# Patient Record
Sex: MALE | Race: WHITE | NOT HISPANIC OR LATINO | Employment: OTHER | URBAN - METROPOLITAN AREA
[De-identification: names, ages, dates, MRNs, and addresses within clinical notes are randomized per-mention and may not be internally consistent; named-entity substitution may affect disease eponyms.]

---

## 2018-09-17 ENCOUNTER — TELEPHONE (OUTPATIENT)
Dept: GASTROENTEROLOGY | Facility: CLINIC | Age: 73
End: 2018-09-17

## 2018-09-17 NOTE — TELEPHONE ENCOUNTER
----- Message from Alyson Saldivar sent at 9/17/2018 12:15 PM CDT -----  Contact: pt  States he's calling regarding the mass on his liver. States he has a CT scan at the [facility] (Dr Marrufo his lung doctor). Please call pt at [phone]. Thank you

## 2018-09-17 NOTE — TELEPHONE ENCOUNTER
Spoke with patient and scheduled appointment and he verbalized understanding that we will have to have hos signature to get records from [facility].

## 2018-09-18 ENCOUNTER — TELEPHONE (OUTPATIENT)
Dept: GASTROENTEROLOGY | Facility: CLINIC | Age: 73
End: 2018-09-18

## 2018-09-18 NOTE — TELEPHONE ENCOUNTER
Spoke with patient and he will goa and  report and labs.  And if he cannot get them on Wednesday he will reschedule.

## 2018-09-18 NOTE — TELEPHONE ENCOUNTER
----- Message from Kwadwo Castellano PA-C sent at 9/18/2018  3:01 PM CDT -----  He is on my schedule for thurs for liver mass. Please check and see if he has any records or do we need to request them.   Thanks.

## 2018-09-20 ENCOUNTER — OFFICE VISIT (OUTPATIENT)
Dept: GASTROENTEROLOGY | Facility: CLINIC | Age: 73
End: 2018-09-20
Payer: MEDICARE

## 2018-09-20 ENCOUNTER — LAB VISIT (OUTPATIENT)
Dept: LAB | Facility: HOSPITAL | Age: 73
End: 2018-09-20
Attending: PHYSICIAN ASSISTANT
Payer: MEDICARE

## 2018-09-20 VITALS
HEIGHT: 71 IN | HEART RATE: 79 BPM | SYSTOLIC BLOOD PRESSURE: 122 MMHG | BODY MASS INDEX: 23.46 KG/M2 | WEIGHT: 167.56 LBS | DIASTOLIC BLOOD PRESSURE: 68 MMHG

## 2018-09-20 DIAGNOSIS — K76.9 LIVER LESION: ICD-10-CM

## 2018-09-20 LAB
AFP SERPL-MCNC: 26 NG/ML
ALBUMIN SERPL BCP-MCNC: 3.8 G/DL
ALP SERPL-CCNC: 72 U/L
ALT SERPL W/O P-5'-P-CCNC: 13 U/L
ANION GAP SERPL CALC-SCNC: 6 MMOL/L
AST SERPL-CCNC: 22 U/L
BILIRUB SERPL-MCNC: 0.8 MG/DL
BUN SERPL-MCNC: 15 MG/DL
CALCIUM SERPL-MCNC: 9.6 MG/DL
CHLORIDE SERPL-SCNC: 107 MMOL/L
CO2 SERPL-SCNC: 28 MMOL/L
CREAT SERPL-MCNC: 1.6 MG/DL
EST. GFR  (AFRICAN AMERICAN): 49 ML/MIN/1.73 M^2
EST. GFR  (NON AFRICAN AMERICAN): 42.4 ML/MIN/1.73 M^2
GLUCOSE SERPL-MCNC: 98 MG/DL
INR PPP: 1
POTASSIUM SERPL-SCNC: 4.1 MMOL/L
PROT SERPL-MCNC: 7.9 G/DL
PROTHROMBIN TIME: 10.3 SEC
SODIUM SERPL-SCNC: 141 MMOL/L

## 2018-09-20 PROCEDURE — 82105 ALPHA-FETOPROTEIN SERUM: CPT

## 2018-09-20 PROCEDURE — 36415 COLL VENOUS BLD VENIPUNCTURE: CPT

## 2018-09-20 PROCEDURE — 99214 OFFICE O/P EST MOD 30 MIN: CPT | Mod: S$PBB,,, | Performed by: PHYSICIAN ASSISTANT

## 2018-09-20 PROCEDURE — 85610 PROTHROMBIN TIME: CPT

## 2018-09-20 PROCEDURE — 99213 OFFICE O/P EST LOW 20 MIN: CPT | Mod: PBBFAC | Performed by: PHYSICIAN ASSISTANT

## 2018-09-20 PROCEDURE — 80053 COMPREHEN METABOLIC PANEL: CPT

## 2018-09-20 PROCEDURE — 99999 PR PBB SHADOW E&M-EST. PATIENT-LVL III: CPT | Mod: PBBFAC,,, | Performed by: PHYSICIAN ASSISTANT

## 2018-09-20 NOTE — PROGRESS NOTES
Subjective:      Patient ID: Shahab Pompa is a 73 y.o. male.    Chief Complaint: Other (liver mas)    HPI  The patient has a history of HCV which was treated with Harvoni in 2016. Patient achieved SVR. At the time, the patient had no evidence of advanced liver disease. He has not followed up since. Today he reports having a liver mass on recent chest imaging. A report has been obtained of a CT chest w/o contrast which was done 09/10/18. Findings included an indeterminate 3.4 cm right hepatic lobe mass and an indeterminate 1.8 cm left upper pole renal mass. He was also noted to have pulmonary fibrosis and cholelithiasis. An MRI was recommended to assess the liver lesion.     He has a personal h/o cancer:  -Prostate cancer treated in 2003 with prostatectomy. His PSA was normal the entire time. Last PSA I see was 0.14 in 2017. His brother and father had PCA.   -Bladder cancer - localized and removed through cystoscopic resection. He thinks his last scope was about six months ago. It was clear. He had one BCG treatment years ago.   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 This was over 5 years ago.   He doesn't have an Oncologist at this time.   He had a normal colonoscopy in 2004. It was normal.     OUTSIDE RECORDS:  (08/23/18)  WBC 7.4  HGB 13.6  HCT 40.3    BUN 16  CREATININE 1.29  GFR 66  T. BILI 0.5  AST 22  ALT 14  ALP 68  ALBUMIN 4.1    Review of Systems  As per HPI.     Objective:     Physical Exam   Constitutional: He is oriented to person, place, and time. He appears well-developed and well-nourished. No distress.   HENT:   Head: Normocephalic and atraumatic.   Eyes: EOM are normal.   Cardiovascular: Normal rate and regular rhythm.   Pulmonary/Chest: Effort normal and breath sounds normal. No respiratory distress. He has no wheezes.   Abdominal: Soft. Bowel sounds are normal. He exhibits no distension. There is no tenderness.   Neurological: He is alert and oriented to person, place, and time. No cranial nerve  deficit.   Skin: He is not diaphoretic.   Psychiatric: His behavior is normal.       Assessment:     1. Liver lesion        Plan:     Discussed findings and appropriate work up including MRI. Further recommendations after results available.     Orders Placed This Encounter   Procedures    MRI Abdomen W WO Contrast    AFP tumor marker    Protime-INR    Comprehensive metabolic panel          Follow-up if symptoms worsen or fail to improve.

## 2018-10-01 ENCOUNTER — HOSPITAL ENCOUNTER (OUTPATIENT)
Dept: RADIOLOGY | Facility: HOSPITAL | Age: 73
Discharge: HOME | End: 2018-10-01
Attending: PHYSICIAN ASSISTANT
Payer: MEDICARE

## 2018-10-01 DIAGNOSIS — K76.9 LIVER LESION: ICD-10-CM

## 2018-10-02 ENCOUNTER — TELEPHONE (OUTPATIENT)
Dept: GASTROENTEROLOGY | Facility: HOSPITAL | Age: 73
End: 2018-10-02

## 2018-10-02 DIAGNOSIS — F41.9 ANXIETY: Primary | ICD-10-CM

## 2018-10-02 RX ORDER — LORAZEPAM 2 MG/1
2 TABLET ORAL EVERY 6 HOURS PRN
Qty: 8 TABLET | Refills: 0 | Status: SHIPPED | OUTPATIENT
Start: 2018-10-02 | End: 2018-12-19 | Stop reason: ALTCHOICE

## 2018-10-02 NOTE — TELEPHONE ENCOUNTER
----- Message from Kwadwo Castellano PA-C sent at 10/1/2018  3:43 PM CDT -----  Contact: pt   Do you mind prescribing him something for his MRI. He tried to have it last week and had a panic attack. He said he would have a .   Kwadwo Rios     ----- Message -----  From: Tyesha Warren MA  Sent: 10/1/2018   2:21 PM  To: Kwadwo Castellano PA-C    Spoke with patient and he will have a  to and from the MRI  ----- Message -----  From: Kwadwo Castellano PA-C  Sent: 10/1/2018   1:03 PM  To: Tyesha Warren MA    Would he be willing to take something before to calm him down? If so, he would need a ride two and from the MRI.   Kwadwo    ----- Message -----  From: Tyesha Warren MA  Sent: 10/1/2018  12:42 PM  To: Kwadwo Castellano PA-C    Patient was unable to do MRI today, he  Had a panic attack.  Please advise  ----- Message -----  From: Sandee Vazquez  Sent: 10/1/2018  11:12 AM  To: Gray Burkett S Staff    States he's calling to have discuss being prescribed something for his MRI. Came in today and had a panic attack and was told to discuss with Dr to have him take something and can be reached at [phone]//nina/edwige orlando to speak with Ms. Arambula - wants to resched MRI    [facility] - [place], [place] - [address]  [address]  [address]  [facility] [address]  Phone: [phone] Fax: [phone]

## 2018-10-02 NOTE — TELEPHONE ENCOUNTER
Patient is aware of medication sent to pharmacy and he will reschedule MRI, and he verbalized understanding.

## 2018-10-02 NOTE — TELEPHONE ENCOUNTER
Anxiety  -     LORazepam (ATIVAN) 2 MG Tab; Take 1 tablet (2 mg total) by mouth every 6 (six) hours as needed.  Dispense: 8 tablet; Refill: 0      Augustine Fenton

## 2018-10-26 ENCOUNTER — TELEPHONE (OUTPATIENT)
Dept: GASTROENTEROLOGY | Facility: CLINIC | Age: 73
End: 2018-10-26

## 2018-10-26 NOTE — TELEPHONE ENCOUNTER
----- Message from Samantha Oden sent at 10/26/2018  2:47 PM CDT -----  Contact: PT  Please call pt @ [phone] regarding open MRI, pt states he need to speak with Tyesha.

## 2018-11-02 ENCOUNTER — TELEPHONE (OUTPATIENT)
Dept: GASTROENTEROLOGY | Facility: CLINIC | Age: 73
End: 2018-11-02

## 2018-11-02 DIAGNOSIS — Z86.19 HX OF HEPATITIS C: ICD-10-CM

## 2018-11-02 DIAGNOSIS — K76.9 LIVER LESION: Primary | ICD-10-CM

## 2018-11-02 NOTE — TELEPHONE ENCOUNTER
----- Message from Alyson Saldivar sent at 11/2/2018 11:33 AM CDT -----  Contact: pt  States he's calling regarding his MRI results of his liver. Please call pt at [phone]. Thank you

## 2018-11-02 NOTE — TELEPHONE ENCOUNTER
Reviewed MRI dated 10/29/18 done at [facility]. MRI was scanned in to media.   3.7 x 3.4 x 4.4 cm solid mass in the posterior segment of the right hepatic dome that is highly suspicious for malignancy. No additional lesions identified  - spoke to patient about results  - plan for CT guided biopsy of liver lesion. Will also ask that random biopsy be done for liver staging as there is no clear evidence of liver cirrhosis.    - needs STAT labs prior.   - He did have a non-contrast Ct at an outside facility. Recommended contrasted study for staging but patient very hesitant due to limited financial funds. Will hold for now.

## 2018-11-05 ENCOUNTER — DOCUMENTATION ONLY (OUTPATIENT)
Dept: GASTROENTEROLOGY | Facility: HOSPITAL | Age: 73
End: 2018-11-05

## 2018-11-05 ENCOUNTER — LAB VISIT (OUTPATIENT)
Dept: LAB | Facility: HOSPITAL | Age: 73
End: 2018-11-05
Attending: NURSE PRACTITIONER
Payer: MEDICARE

## 2018-11-05 DIAGNOSIS — Z86.19 HX OF HEPATITIS C: ICD-10-CM

## 2018-11-05 DIAGNOSIS — K76.9 LIVER LESION: ICD-10-CM

## 2018-11-05 LAB
APTT BLDCRRT: 30.3 SEC
BASOPHILS # BLD AUTO: 0.05 K/UL
BASOPHILS NFR BLD: 0.7 %
DIFFERENTIAL METHOD: ABNORMAL
EOSINOPHIL # BLD AUTO: 0.7 K/UL
EOSINOPHIL NFR BLD: 10 %
ERYTHROCYTE [DISTWIDTH] IN BLOOD BY AUTOMATED COUNT: 13.7 %
HCT VFR BLD AUTO: 44.7 %
HGB BLD-MCNC: 14.4 G/DL
INR PPP: 1
LYMPHOCYTES # BLD AUTO: 2.1 K/UL
LYMPHOCYTES NFR BLD: 28.6 %
MCH RBC QN AUTO: 29.7 PG
MCHC RBC AUTO-ENTMCNC: 32.2 G/DL
MCV RBC AUTO: 92 FL
MONOCYTES # BLD AUTO: 0.9 K/UL
MONOCYTES NFR BLD: 12 %
NEUTROPHILS # BLD AUTO: 3.6 K/UL
NEUTROPHILS NFR BLD: 48.7 %
PLATELET # BLD AUTO: 201 K/UL
PMV BLD AUTO: 9.3 FL
PROTHROMBIN TIME: 10.1 SEC
RBC # BLD AUTO: 4.85 M/UL
WBC # BLD AUTO: 7.32 K/UL

## 2018-11-05 PROCEDURE — 36415 COLL VENOUS BLD VENIPUNCTURE: CPT

## 2018-11-05 PROCEDURE — 85025 COMPLETE CBC W/AUTO DIFF WBC: CPT

## 2018-11-05 PROCEDURE — 85730 THROMBOPLASTIN TIME PARTIAL: CPT

## 2018-11-05 PROCEDURE — 85610 PROTHROMBIN TIME: CPT

## 2018-11-09 ENCOUNTER — TELEPHONE (OUTPATIENT)
Dept: RADIOLOGY | Facility: HOSPITAL | Age: 73
End: 2018-11-09

## 2018-11-12 ENCOUNTER — HOSPITAL ENCOUNTER (OUTPATIENT)
Dept: RADIOLOGY | Facility: HOSPITAL | Age: 73
Discharge: HOME | End: 2018-11-12
Attending: NURSE PRACTITIONER | Admitting: INTERNAL MEDICINE
Payer: MEDICARE

## 2018-11-12 ENCOUNTER — HOSPITAL ENCOUNTER (OUTPATIENT)
Facility: HOSPITAL | Age: 73
Discharge: HOME | End: 2018-11-12
Attending: INTERNAL MEDICINE | Admitting: INTERNAL MEDICINE
Payer: MEDICARE

## 2018-11-12 VITALS
WEIGHT: 170 LBS | HEIGHT: 71 IN | BODY MASS INDEX: 23.8 KG/M2 | RESPIRATION RATE: 16 BRPM | SYSTOLIC BLOOD PRESSURE: 132 MMHG | HEART RATE: 57 BPM | TEMPERATURE: 98 F | OXYGEN SATURATION: 99 % | DIASTOLIC BLOOD PRESSURE: 77 MMHG

## 2018-11-12 DIAGNOSIS — Z86.19 HX OF HEPATITIS C: ICD-10-CM

## 2018-11-12 DIAGNOSIS — K76.9 LIVER LESION: ICD-10-CM

## 2018-11-12 PROCEDURE — 88313 SPECIAL STAINS GROUP 2: CPT | Mod: 26,,, | Performed by: PATHOLOGY

## 2018-11-12 PROCEDURE — 27200940 CT BIOPSY LIVER (XPD)

## 2018-11-12 PROCEDURE — 88307 TISSUE EXAM BY PATHOLOGIST: CPT | Mod: 26,,, | Performed by: PATHOLOGY

## 2018-11-12 PROCEDURE — 63600175 PHARM REV CODE 636 W HCPCS: Performed by: RADIOLOGY

## 2018-11-12 PROCEDURE — 88307 TISSUE EXAM BY PATHOLOGIST: CPT | Mod: 59 | Performed by: PATHOLOGY

## 2018-11-12 PROCEDURE — 88313 SPECIAL STAINS GROUP 2: CPT | Mod: 59 | Performed by: PATHOLOGY

## 2018-11-12 RX ORDER — FENTANYL CITRATE 50 UG/ML
INJECTION, SOLUTION INTRAMUSCULAR; INTRAVENOUS CODE/TRAUMA/SEDATION MEDICATION
Status: COMPLETED | OUTPATIENT
Start: 2018-11-12 | End: 2018-11-12

## 2018-11-12 RX ORDER — MIDAZOLAM HYDROCHLORIDE 1 MG/ML
INJECTION INTRAMUSCULAR; INTRAVENOUS CODE/TRAUMA/SEDATION MEDICATION
Status: COMPLETED | OUTPATIENT
Start: 2018-11-12 | End: 2018-11-12

## 2018-11-12 RX ADMIN — FENTANYL CITRATE 50 MCG: 50 INJECTION, SOLUTION INTRAMUSCULAR; INTRAVENOUS at 09:11

## 2018-11-12 RX ADMIN — MIDAZOLAM HYDROCHLORIDE 1 MG: 1 INJECTION, SOLUTION INTRAMUSCULAR; INTRAVENOUS at 09:11

## 2018-11-12 NOTE — DISCHARGE INSTRUCTIONS
Recovery After Procedural Sedation (Adult)  You have been given medicine by vein to make you sleep during your surgery. This may have included both a pain medicine and sleeping medicine. Most of the effects have worn off. But you may still have some drowsiness for the next 6 to 8 hours.  Home care  Follow these guidelines when you get home:  · For the next 8 hours, you should be watched by a responsible adult. This person should make sure your condition is not getting worse.  · Don't drink any alcohol for the next 24 hours.  · Don't drive, operate dangerous machinery, or make important business or personal decisions during the next 24 hours.  Note: Your healthcare provider may tell you not to take any medicine by mouth for pain or sleep in the next 4 hours. These medicines may react with the medicines you were given in the hospital. This could cause a much stronger response than usual.  Follow-up care  Follow up with your healthcare provider if you are not alert and back to your usual level of activity within 12 hours.  When to seek medical advice  Call your healthcare provider right away if any of these occur:  · Drowsiness gets worse  · Weakness or dizziness gets worse  · Repeated vomiting  · You can't be awakened   Date Last Reviewed: 10/18/2016  © 5441-9842 [employer]. [address] [place] [address], [place] [address]. All rights reserved. This information is not intended as a substitute for professional medical care. Always follow your healthcare professional's instructions.        Discharge Instructions for Liver Biopsy  You had a procedure called liver biopsy. A healthcare provider used a special needle to remove a small piece of tissue from your liver. Then it was examined for signs of damage or disease. A liver biopsy is ordered after other tests have shown that your liver is not working properly. You may also have a liver biopsy when liver disease is suspected, to determine whether there is too  much iron in the liver, or to rule out cancer.  Home care  Recommendations include the following:   · Because you had anesthesia, you should not drive until the day after your biopsy.   · Remove the bandage covering the biopsy site 48 hours after the procedure.  · Rest for 6 hours and take it easy when you arrive home.  · Dont shower for 24 hours after the biopsy. If you wish, you may wash yourself with a sponge or washcloth. When you are able to shower, dont scrub the site. Gently wash the area and pat it dry.  · Dont lift anything heavier than 10 pounds for up to 1 week after the procedure, or as advised by your healthcare provider.  · Don't do strenuous activities or exercises for up to 1 week after the procedure.  · Ask your healthcare provider when you can return to work.  · Do not start taking blood thinners without clear instructions from your healthcare provider.  Follow-up care  Make a follow-up appointment as directed by our staff.     When to call your healthcare provider  Call your healthcare provider immediately if you have any of the following:  · Bleeding from the biopsy site  · Dizziness or lightheadedness  · Sudden or increased shortness of breath  · Sudden chest pain  · Fever of 100.4°F (38.0°C) or higher, or as directed by your healthcare provider  · Shaking chills  · Yellow eyes or skin  · Increasing redness, tenderness, or swelling at the biopsy site  · Drainage from the biopsy site  · Opening of the biopsy site  · Vomiting blood  · Rectal bleeding or bloody stools  · Increasing pain, with or without activity, in the liver or belly area, or pain shooting to the right shoulder   Date Last Reviewed: 7/1/2016 © 2000-2017 [employer]. [address] [place] [id number]. All rights reserved. This information is not intended as a substitute for professional medical care. Always follow your healthcare professional's instructions.

## 2018-11-12 NOTE — SEDATION DOCUMENTATION
Procedure complete, pt tolerated well.  Vss.  Band aid placed to R side puncture site, site WDL.  Pt awake and alert, able to follow commands. Pt denied pain or discomfort at this time.

## 2018-11-12 NOTE — SEDATION DOCUMENTATION
Pt in ct on table with bilateral arms above head. Pt verbalized understanding of procedure.  Cm in place, vss.

## 2018-11-12 NOTE — DISCHARGE SUMMARY
Sterile technique was performed in the RUQ, lidocaine was used as a local anesthetic.  Multiple samples taken from liver core and liver mass.  Pt tolerated the procedure well without immediate complications.  Please see radiologist report for details. F/u with PCP and/or ordering physician.

## 2018-11-12 NOTE — H&P
[facility] -   History & Physical    Subjective:      Chief Complaint/Reason for Admission: HCV and liver mass    Shahab Pompa is a 73 y.o. male.    Past Medical History:   Diagnosis Date    Cancer     prostate, bladder, and ear    Fibrosis lung     GERD (gastroesophageal reflux disease)     Hepatitis C     tx'd in 2016 w/ Eliasoni - achieved SVR    Neuromuscular disorder      Past Surgical History:   Procedure Laterality Date    BLADDER SURGERY      cancer removal    CAROTID ENDARTERECTOMY Right     INGUINAL HERNIA REPAIR Bilateral     KNEE SURGERY      PROSTATE SURGERY      cancer removal     Family History   Problem Relation Age of Onset    Cancer Mother     Cancer Father         bladder and prostate    Colon cancer Neg Hx     Stomach cancer Neg Hx      Social History     Tobacco Use    Smoking status: Current Every Day Smoker     Packs/day: 0.50     Years: 30.00     Pack years: 15.00    Smokeless tobacco: Never Used   Substance Use Topics    Alcohol use: No    Drug use: No         (Not in a hospital admission)  Review of patient's allergies indicates:  No Known Allergies     Review of Systems   Constitutional: Negative.    Respiratory: Negative.    Cardiovascular: Negative.    Gastrointestinal: Negative.    Skin: Negative.        Objective:      Vital Signs (Most Recent)  Temp: 98.1 °F (36.7 °C) (11/12/18 0834)  Pulse: 62 (11/12/18 0943)  Resp: 12 (11/12/18 0943)  BP: 127/76 (11/12/18 0943)  SpO2: 100 % (11/12/18 0943)    Vital Signs Range (Last 24H):  Temp:  [98.1 °F (36.7 °C)]   Pulse:  [62-69]   Resp:  [12-21]   BP: (117-139)/(74-76)   SpO2:  [99 %-100 %]     Physical Exam   Constitutional: He appears well-developed and well-nourished.   Eyes: Pupils are equal, round, and reactive to light.   Neck: Normal range of motion.   Cardiovascular: Normal rate.   Pulmonary/Chest: Effort normal.   Abdominal: Soft.       Data Review:    CBC:   Lab Results   Component Value Date    WBC  7.32 11/05/2018    RBC 4.85 11/05/2018    HGB 14.4 11/05/2018    HCT 44.7 11/05/2018     11/05/2018      ECG: no prior ECG.     Assessment:      There are no hospital problems to display for this patient.      Plan:    CT guided liver mass and general liver tissue

## 2018-11-19 ENCOUNTER — TELEPHONE (OUTPATIENT)
Dept: GASTROENTEROLOGY | Facility: CLINIC | Age: 73
End: 2018-11-19

## 2018-11-19 NOTE — TELEPHONE ENCOUNTER
----- Message from Dana Voss sent at 11/19/2018 12:17 PM CST -----  Patient needs call back to get test results..[phone] (home)

## 2018-11-19 NOTE — TELEPHONE ENCOUNTER
Returned call to pt who has questions about liver bx and would like a call from Provider. Pt can be reached at [phone]

## 2018-11-23 ENCOUNTER — TELEPHONE (OUTPATIENT)
Dept: GASTROENTEROLOGY | Facility: CLINIC | Age: 73
End: 2018-11-23

## 2018-11-23 NOTE — TELEPHONE ENCOUNTER
I spoke to the patient about his biopsy results. Dr. Greene recommends getting the films and presenting him to Radiology conference. He reported understanding and will contact us in two weeks.

## 2018-11-26 ENCOUNTER — TELEPHONE (OUTPATIENT)
Dept: GASTROENTEROLOGY | Facility: CLINIC | Age: 73
End: 2018-11-26

## 2018-11-26 NOTE — TELEPHONE ENCOUNTER
Patient: Shahab Pompa       MRN: [id number]      : 1945     Age: 73 y.o.  [address]  [address]  [place] [id number]    Provider: Hepatologist - Víctor Castellano    Urgency of review: urgent    Patient Transplant Status: Other Recent abnormal MRI showing liver lesion    Reason for presentation: Indeterminate lesion    Clinical Summary: The patient has a history of HCV which was treated with Harvoni in 2016. Patient achieved SVR. At the time, the patient had no evidence of advanced liver disease. He reported to GI clinic two months ago with liver mass seen on chest imaging 09/10/18. The CT chest w/o contrast showed an indeterminate 3.4 cm right hepatic lobe mass and an indeterminate 1.8 cm left upper pole renal mass. He was also noted to have pulmonary fibrosis and cholelithiasis. An MRI was recommended which was done 10/29/18 and showed a solid, enhancing right hepatic mass highly suspicious for malignancy. This was followed by liver biopsy showing no cancer. Variable fibrosis, portal expansion and thickened septa dividing the cores, consistent with macronodules, stage 4 out of 4AFP 26.       Imaging to be reviewed: Outside MRI    HCC Treatment History: None    ABO: N/A    Platelets:   Lab Results   Component Value Date/Time     2018 12:28 PM     Creatinine:   Lab Results   Component Value Date/Time    CREATININE 1.6 (H) 2018 08:52 AM     Bilirubin:   Lab Results   Component Value Date/Time    BILITOT 0.8 2018 08:52 AM     AFP Last 3 each if available:   Lab Results   Component Value Date/Time    AFP 26 (H) 2018 08:52 AM    AFP 5.6 2016 09:07 AM       MELD: MELD-Na score: 11 at 2018  8:52 AM  MELD score: 11 at 2018  8:52 AM  Calculated from:  Serum Creatinine: 1.6 mg/dL at 2018  8:52 AM  Serum Sodium: 141 mmol/L (Rounded to 137 mmol/L) at 2018  8:52 AM  Total Bilirubin: 0.8 mg/dL (Rounded to 1 mg/dL) at 2018  8:52 AM  INR(ratio): 1 at  9/20/2018  8:52 AM  Age: 73 years    Plan:     Follow-up Provider:

## 2018-11-26 NOTE — TELEPHONE ENCOUNTER
----- Message from Kwadwo Castellano PA-C sent at 11/23/2018  3:02 PM CST -----  We need to get a copy of the MRI on disc so he can be presented in to Radiology conference.   Kwadwo Castellano    ----- Message -----  From: Shannon Greene MD  Sent: 11/16/2018   5:11 PM  To: Geovanni Calero MD, Kwadwo Castellano PA-C, #    Ok, I see the MRI was indeterminante and looks like he is not a cirrhotic patient?    ----- Message -----  From: Geovanni Claero MD  Sent: 11/16/2018   3:49 PM  To: Kwadwo Castellano PA-C, Shannon Greene MD, #    Not that I am aware of.  Was thinking of just seeing if we can see it by ultrasound for bx.    ----- Message -----  From: Shannon Greene MD  Sent: 11/16/2018   3:27 PM  To: Geovanni Calero MD, Kwadwo Castellano PA-C, #    Is there a reason he can't get a MRI? Metal in his body?    ----- Message -----  From: Geovanni Calero MD  Sent: 11/16/2018   2:58 PM  To: Shannon Greene MD, Tami Thomas NP    Thought I was in it but bx showed no tumor.  Get an ultrasound to see if we can see and maybe try bx with ultrasound.

## 2018-11-27 ENCOUNTER — CONFERENCE (OUTPATIENT)
Dept: TRANSPLANT | Facility: CLINIC | Age: 73
End: 2018-11-27

## 2018-11-27 ENCOUNTER — TELEPHONE (OUTPATIENT)
Dept: GASTROENTEROLOGY | Facility: CLINIC | Age: 73
End: 2018-11-27

## 2018-11-27 NOTE — TELEPHONE ENCOUNTER
The patient's case was presented this morning. The recommendation was for him to see Dr. Fulton in [place]. I contacted the patient and let him know that the committee was concerned for cancer and recommended a surgical consultation. His questions were answered and he reported understanding. He is agreeable to consultation in [place].

## 2018-11-27 NOTE — TELEPHONE ENCOUNTER
Referral received from Dr Greene  Initial  referral from   Patient with Histroy of HCV with  HCC  MELD  11  Referred for liver transplant for CONSULT    Referral completed and forwarded to [facility].          Insurance: EPIC  PRIMARY:   ID:  Contact #     SECONDARY:   ID:  Contact #

## 2018-11-27 NOTE — TELEPHONE ENCOUNTER
Patient: Shahab Pompa       MRN: [id number]      : 1945     Age: 73 y.o.  [address]  [address]  [place] [address]     Provider: Hepatologist - Víctor Castellano     Urgency of review: urgent     Patient Transplant Status: Other Recent abnormal MRI showing liver lesion     Reason for presentation: Indeterminate lesion     Clinical Summary: The patient has a history of HCV which was treated with Harvoni in 2016. Patient achieved SVR. At the time, the patient had no evidence of advanced liver disease. He reported to GI clinic two months ago with liver mass seen on chest imaging 09/10/18. The CT chest w/o contrast showed an indeterminate 3.4 cm right hepatic lobe mass and an indeterminate 1.8 cm left upper pole renal mass. He was also noted to have pulmonary fibrosis and cholelithiasis. An MRI was recommended which was done 10/29/18 and showed a solid, enhancing right hepatic mass highly suspicious for malignancy. This was followed by liver biopsy showing no cancer. Variable fibrosis, portal expansion and thickened septa dividing the cores, consistent with macronodules, stage 4 out of 4AFP 26.         Imaging to be reviewed: Outside MRI     HCC Treatment History: None     ABO: N/A     Platelets:         Lab Results   Component Value Date/Time      2018 12:28 PM      Creatinine:         Lab Results   Component Value Date/Time     CREATININE 1.6 (H) 2018 08:52 AM      Bilirubin:         Lab Results   Component Value Date/Time     BILITOT 0.8 2018 08:52 AM      AFP Last 3 each if available:         Lab Results   Component Value Date/Time     AFP 26 (H) 2018 08:52 AM     AFP 5.6 2016 09:07 AM         MELD: MELD-Na score: 11 at 2018  8:52 AM  MELD score: 11 at 2018  8:52 AM  Calculated from:  Serum Creatinine: 1.6 mg/dL at 2018  8:52 AM  Serum Sodium: 141 mmol/L (Rounded to 137 mmol/L) at 2018  8:52 AM  Total Bilirubin: 0.8 mg/dL (Rounded to 1  mg/dL) at 9/20/2018  8:52 AM  INR(ratio): 1 at 9/20/2018  8:52 AM  Age: 73 years     Plan: MRI shows a lobulated enhancing mass (4.0 cm) which washes out. There is associated pseudocapsule. This lesion is concerning for malignancy.       Concerning for HCC enhancement pseudo capsule and washout.  Right posterior lobe resection.  Surgery Consult with Dr Fulton       Patient notified of IR review and recommendations by JACOB ROGERS  Understanding and agreement expressed.        Follow-up Provider:

## 2018-12-03 ENCOUNTER — INITIAL CONSULT (OUTPATIENT)
Dept: TRANSPLANT | Facility: CLINIC | Age: 73
End: 2018-12-03
Payer: MEDICARE

## 2018-12-03 VITALS
DIASTOLIC BLOOD PRESSURE: 75 MMHG | HEIGHT: 71 IN | SYSTOLIC BLOOD PRESSURE: 130 MMHG | TEMPERATURE: 99 F | OXYGEN SATURATION: 98 % | WEIGHT: 168.88 LBS | RESPIRATION RATE: 17 BRPM | HEART RATE: 103 BPM | BODY MASS INDEX: 23.64 KG/M2

## 2018-12-03 DIAGNOSIS — C22.0 HCC (HEPATOCELLULAR CARCINOMA): ICD-10-CM

## 2018-12-03 DIAGNOSIS — Z01.818 PRE-OP TESTING: Primary | ICD-10-CM

## 2018-12-03 PROCEDURE — 1101F PT FALLS ASSESS-DOCD LE1/YR: CPT | Mod: S$GLB,,, | Performed by: TRANSPLANT SURGERY

## 2018-12-03 PROCEDURE — 99999 PR PBB SHADOW E&M-EST. PATIENT-LVL III: CPT | Mod: PBBFAC,TXP,,

## 2018-12-03 PROCEDURE — 99204 OFFICE O/P NEW MOD 45 MIN: CPT | Mod: S$GLB,,, | Performed by: TRANSPLANT SURGERY

## 2018-12-04 DIAGNOSIS — C22.0 HCC (HEPATOCELLULAR CARCINOMA): Primary | ICD-10-CM

## 2018-12-04 NOTE — PROGRESS NOTES
Subjective:       Patient ID: Shahab Pompa is a 73 y.o. male.    Chief Complaint: Hepatic Cancer (HCC)    73 M referred for management of hepatic mass. Mr Pompa has has a history of hepatitis C (treated, SVR), prostate cancer and IPF. A liver mass was noted on surveillance imaging. CT guided biopsy was not diagnostic for HCC, but subsequent imaging with MRI was diagnostic for HCC with enhancement and washout with pseudocapsule. There is no evidence of extrahepatic disease and his liver function and platelets are normal. He otherwise is feeling well with no complaints.      Review of Systems   Constitutional: Negative for activity change and appetite change.   HENT: Negative for congestion and tinnitus.    Eyes: Negative for redness and visual disturbance.   Respiratory: Negative for cough and shortness of breath.    Cardiovascular: Negative for chest pain and palpitations.   Gastrointestinal: Negative for abdominal distention and abdominal pain.   Endocrine: Negative for polydipsia and polyuria.   Genitourinary: Negative for decreased urine volume and dysuria.   Musculoskeletal: Negative for arthralgias and back pain.   Skin: Negative for pallor and rash.   Allergic/Immunologic: Negative for environmental allergies and immunocompromised state.   Neurological: Negative for tremors and headaches.   Hematological: Negative for adenopathy. Does not bruise/bleed easily.   Psychiatric/Behavioral: Negative for behavioral problems and confusion.       Objective:      Physical Exam   Constitutional: He is oriented to person, place, and time. He appears well-developed and well-nourished. No distress.   HENT:   Head: Normocephalic.   Eyes: No scleral icterus.   Neck: Normal range of motion. Neck supple. No JVD present.   Cardiovascular: Normal rate, regular rhythm and intact distal pulses.   Pulmonary/Chest: Effort normal. No respiratory distress.   Abdominal: Soft. He exhibits no mass. There is no rebound and no guarding.        Musculoskeletal: Normal range of motion.   Neurological: He is alert and oriented to person, place, and time.   Skin: Skin is warm and dry. He is not diaphoretic.   Psychiatric: He has a normal mood and affect. His behavior is normal. Judgment and thought content normal.       Assessment:       1. HCC (hepatocellular carcinoma)        Plan:       73 M with liver mass consistent with HCC in the setting of prior hepatitis C infection. The lesion in the right posterior segment meets imaging criteria for HCC. His case was reviewed at multidisciplinary conference with the recommendation to consider resection vs transplant vs locoregional therapy. Of note, a second lesion in segment 4 is indeterminate, but concerning for HCC. We discussed treatment options. While liver transplant offers the lowest risk of tumor recurrence, the risks of transplant and immunosuppresion in patients over 70 is increased. We also discussed the risks and benefits of liver resection and locoregional therapies including TACE, Y90 and ablation. On balance, I recommend surgical resection of the right posterior lobe lesion, with a possible complete right lobe if the indeterminate lesion is confirmed HCC and the liver is normal. We will begin with exploratory laparoscopy to exclude advanced cirrhosis.     After discussion he and his wife agree to proceed with resection. He will need to be seen for pre op assessment by Dr Rapp given his age and comorbidities. We will look for the soonest available OR date. All questions were addressed.    Edwar Fulton MD  Liver Transplant and Hepatobiliary Surgery

## 2018-12-17 ENCOUNTER — HOSPITAL ENCOUNTER (OUTPATIENT)
Dept: CARDIOLOGY | Facility: CLINIC | Age: 73
Discharge: HOME | End: 2018-12-17
Payer: MEDICARE

## 2018-12-17 ENCOUNTER — HOSPITAL ENCOUNTER (OUTPATIENT)
Dept: RADIOLOGY | Facility: HOSPITAL | Age: 73
Discharge: HOME | End: 2018-12-17
Attending: TRANSPLANT SURGERY
Payer: MEDICARE

## 2018-12-17 DIAGNOSIS — Z01.818 PRE-OP TESTING: ICD-10-CM

## 2018-12-17 PROCEDURE — 93010 ELECTROCARDIOGRAM REPORT: CPT | Mod: S$GLB,,, | Performed by: INTERNAL MEDICINE

## 2018-12-17 PROCEDURE — 93005 ELECTROCARDIOGRAM TRACING: CPT | Mod: S$GLB,,, | Performed by: TRANSPLANT SURGERY

## 2018-12-17 PROCEDURE — 71046 X-RAY EXAM CHEST 2 VIEWS: CPT | Mod: TC,FY

## 2018-12-17 PROCEDURE — 71046 X-RAY EXAM CHEST 2 VIEWS: CPT | Mod: 26,,, | Performed by: RADIOLOGY

## 2018-12-18 ENCOUNTER — TELEPHONE (OUTPATIENT)
Dept: TRANSPLANT | Facility: CLINIC | Age: 73
End: 2018-12-18

## 2018-12-18 PROBLEM — Z85.46 PERSONAL HISTORY OF MALIGNANT NEOPLASM OF PROSTATE: Status: ACTIVE | Noted: 2018-12-18

## 2018-12-18 PROBLEM — J84.9 CHRONIC INTERSTITIAL LUNG DISEASE: Status: ACTIVE | Noted: 2018-12-18

## 2018-12-18 PROBLEM — Z98.890 HISTORY OF RIGHT-SIDED CAROTID ENDARTERECTOMY: Status: ACTIVE | Noted: 2018-12-18

## 2018-12-18 NOTE — TELEPHONE ENCOUNTER
Called patient to notify him that he has a pre-op appointment with Dr. Rapp tomorrow 12/19 at 0900. Instructed patient that the appointment is on the 2nd floor of the hospital in the surgery center. Patient verbalized understanding of all information.

## 2018-12-19 ENCOUNTER — INITIAL CONSULT (OUTPATIENT)
Dept: INTERNAL MEDICINE | Facility: CLINIC | Age: 73
End: 2018-12-19
Payer: MEDICARE

## 2018-12-19 VITALS
HEIGHT: 71 IN | WEIGHT: 170.5 LBS | HEART RATE: 70 BPM | DIASTOLIC BLOOD PRESSURE: 82 MMHG | OXYGEN SATURATION: 99 % | TEMPERATURE: 98 F | SYSTOLIC BLOOD PRESSURE: 147 MMHG | BODY MASS INDEX: 23.87 KG/M2

## 2018-12-19 DIAGNOSIS — C22.0 HCC (HEPATOCELLULAR CARCINOMA): ICD-10-CM

## 2018-12-19 DIAGNOSIS — J43.8 OTHER EMPHYSEMA: ICD-10-CM

## 2018-12-19 DIAGNOSIS — I83.92 ASYMPTOMATIC VARICOSE VEINS OF LEFT LOWER EXTREMITY: ICD-10-CM

## 2018-12-19 DIAGNOSIS — I10 ESSENTIAL HYPERTENSION: ICD-10-CM

## 2018-12-19 DIAGNOSIS — J84.112 IPF (IDIOPATHIC PULMONARY FIBROSIS): ICD-10-CM

## 2018-12-19 DIAGNOSIS — Z85.46 PERSONAL HISTORY OF MALIGNANT NEOPLASM OF PROSTATE: ICD-10-CM

## 2018-12-19 DIAGNOSIS — G47.00 INSOMNIA, UNSPECIFIED TYPE: ICD-10-CM

## 2018-12-19 DIAGNOSIS — Z01.818 PREOP EXAMINATION: Primary | ICD-10-CM

## 2018-12-19 DIAGNOSIS — Z85.51 HISTORY OF BLADDER CANCER: ICD-10-CM

## 2018-12-19 DIAGNOSIS — K21.9 GASTROESOPHAGEAL REFLUX DISEASE, ESOPHAGITIS PRESENCE NOT SPECIFIED: ICD-10-CM

## 2018-12-19 DIAGNOSIS — N18.30 CHRONIC KIDNEY DISEASE, STAGE III (MODERATE): ICD-10-CM

## 2018-12-19 DIAGNOSIS — Z72.0 TOBACCO ABUSE: ICD-10-CM

## 2018-12-19 DIAGNOSIS — F11.90 CHRONIC, CONTINUOUS USE OF OPIOIDS: ICD-10-CM

## 2018-12-19 DIAGNOSIS — G89.29 CHRONIC BILATERAL LOW BACK PAIN WITH SCIATICA, SCIATICA LATERALITY UNSPECIFIED: ICD-10-CM

## 2018-12-19 DIAGNOSIS — K80.20 CALCULUS OF GALLBLADDER WITHOUT CHOLECYSTITIS WITHOUT OBSTRUCTION: ICD-10-CM

## 2018-12-19 DIAGNOSIS — Z98.890 HISTORY OF RIGHT-SIDED CAROTID ENDARTERECTOMY: ICD-10-CM

## 2018-12-19 DIAGNOSIS — M54.40 CHRONIC BILATERAL LOW BACK PAIN WITH SCIATICA, SCIATICA LATERALITY UNSPECIFIED: ICD-10-CM

## 2018-12-19 DIAGNOSIS — R42 POSTURAL DIZZINESS: ICD-10-CM

## 2018-12-19 PROBLEM — C67.9 BLADDER CANCER: Status: ACTIVE | Noted: 2018-12-19

## 2018-12-19 PROCEDURE — 99204 OFFICE O/P NEW MOD 45 MIN: CPT | Mod: S$GLB,,, | Performed by: HOSPITALIST

## 2018-12-19 PROCEDURE — 1101F PT FALLS ASSESS-DOCD LE1/YR: CPT | Mod: S$GLB,,, | Performed by: HOSPITALIST

## 2018-12-19 PROCEDURE — 99999 PR PBB SHADOW E&M-EST. PATIENT-LVL III: CPT | Mod: PBBFAC,,, | Performed by: HOSPITALIST

## 2018-12-19 RX ORDER — LACTULOSE 10 G/15ML
SOLUTION ORAL; RECTAL
COMMUNITY

## 2018-12-19 RX ORDER — ESZOPICLONE 3 MG/1
3 TABLET, FILM COATED ORAL NIGHTLY
COMMUNITY

## 2018-12-19 RX ORDER — DESONIDE 0.5 MG/G
CREAM TOPICAL
COMMUNITY
End: 2019-01-11

## 2018-12-19 RX ORDER — AMLODIPINE BESYLATE 5 MG/1
5 TABLET ORAL NIGHTLY PRN
COMMUNITY

## 2018-12-19 NOTE — ASSESSMENT & PLAN NOTE
On laying flat on his bed , feels acid reflux   Contributors - Coffee, tobacco, Aspirin   Ameena hirsch helps   I suggest aspiration precautions

## 2018-12-19 NOTE — ASSESSMENT & PLAN NOTE
Cutting down   Planning on quitting for surgery  Wheeze resolves with tobacco cessatoon    known to have  Emphysema  No oxygen use   Tobacco use-I  Informed about risk of wound healing problem ,infection,lung complications,thrombosis with tobacco use    I suggested to consider stopping  smoking tobacco for its benefits in the gurdeep operative period and in the long term    Relation of bladder cancer , emphysema, Vascular disease with tobacco discussed

## 2018-12-19 NOTE — ASSESSMENT & PLAN NOTE
Diagnosed around 2000  Was getting imaging for some other reason when he was found to have pulmonary fibrosis   Under pulmonology Dr Marrufo   Gets SOB on heavier exertion fort a long time     Does not feel SOB with day to day activities   No recent flare up   On Combivent nightly   No oxygen use   No suggestion of symptomatic advanced lung disease   Will review Pulmonology records

## 2018-12-19 NOTE — ASSESSMENT & PLAN NOTE
For about 3 years   Chronic continuous opioid use- In view of the opioid use, the patient may have opioid tolerance .I suggest considering the possibility of opioid tolerance  in planning post operative pain control

## 2018-12-19 NOTE — ASSESSMENT & PLAN NOTE
On Amlodipine on occasions   If takes regularly,  has low BP  Suggested BP monitoring closer to surgery   Home BP readings -none  Recent BP readings in the record-130/70's  Hypertension-  Blood pressure is acceptable . I suggest continuation of -Amlodipine -- during the entire perioperative period.I suggest addressing pain control as uncontrolled pain can increased blood pressure

## 2018-12-19 NOTE — PROGRESS NOTES
[facility] - Pre Op Consult  Progress Note    Patient Name: Shahab Pompa  MRN: [id number]  Date of Evaluation- 12/21/2018  PCP- Blake Escobar MD    Future cases for Shahab Pompa [[id number]]     Case ID Status Date Time Wilmer Procedure Provider Location    [id number] [facility] 1/4/2019 12:00  LAPAROTOMY, EXPLORATORY Edwar Fulton MD [[id number]] [facility] OR 2ND FLR          HPI:  History of present illness- I had the pleasure of meeting this pleasant 73 y.o. gentleman in the pre op clinic prior to his elective Abdominal surgery. The patient is new to me .     I have obtained the history by speaking to the patient and by reviewing the electronic health records.    Events leading up to surgery / History of presenting illness -    HCC (hepatocellular carcinoma    Gets regular Pulmonology evaluations for pulmonary fibrosis   Had CT lung couple of months ago of the lung that picked up a liver abnormality   A liver mass was noted on  imaging.   CT guided biopsy was not diagnostic for HCC, but subsequent imaging with MRI was diagnostic for HCC    There is no evidence of extrahepatic disease .    Relevant health conditions of significance for the perioperative period/ History of presenting illness -    Subjectively describes health as good    His main issues are lung fibrosis , Liver , lumbar disc problem   Over all he feels good    Patient Active Problem List    Diagnosis Date Noted    Postural dizziness 12/21/2018    Hypertension 12/19/2018    History of bladder cancer 12/19/2018     Overview:   BCG Treatment      Chronic, continuous use of opioids 12/19/2018    Acid reflux 12/19/2018    Other emphysema 12/19/2018    Asymptomatic varicose veins of left lower extremity 12/19/2018    IPF (idiopathic pulmonary fibrosis) 12/18/2018    Personal history of malignant neoplasm of prostate 12/18/2018    History of right-sided carotid endarterectomy 12/18/2018    HCC (hepatocellular carcinoma) 12/04/2018    Chronic bilateral low back  pain with sciatica 08/23/2018    Calculus of gallbladder without cholecystitis without obstruction 06/01/2016    History of hepatitis C 04/26/2016    Insomnia 12/22/2014    Chronic kidney disease, stage III (moderate) 12/22/2014    Tobacco abuse 08/07/2012     Not known to have heart disease , Diabetes Mellitus       Subjective/ Objective:          Chief complaint-Preoperative evaluation, Perioperative Medical management, complication reduction plan     Active cardiac conditions- none    Revised cardiac risk index predictors- high-risk type of surgery and history of cerebrovascular disease    Functional capacity -Examples of physical activity, stays  fairly active , walks,    house work and can take 1 flight of stairs----- He can undertake all the above activities without  chest pain,chest tightness, Shortness of breath ,dizziness,lightheadedness making his exercise tolerance more  than 4 Mets.   Winded on heavier / faster exertion, not new ,stable over time     Review of Systems   Constitutional: Negative for chills and fever.        No unusual weight changes     HENT:        STOPBANG score 4 / 8    HTN  Age over 50 years  Neck size over 40 CM  Male gender   Eyes:        No unusual vision changes   Respiratory:           No change in color , consistency, amount of phlegm    No Hemoptysis   Cardiovascular:        As noted   Gastrointestinal:        Bowels- Regular - while on Lactuolose   No overt GI/ blood losses   Endocrine:        Prednisone use > 20 mg daily for 3 weeks- none    Genitourinary: Negative for dysuria.        No urinary hesitancy    Musculoskeletal:        As above      Neurological: Negative for syncope.        No unilateral weakness   Hematological:        Current use of Anticoagulants  None   Psychiatric/Behavioral:        No Depression,Anxiety       No vascular stenting   \  No past medical history pertinent negatives.        No anesthesia, bleeding, cardiac problems, PONV with previous  "surgeries/procedures.  Medications and Allergies reviewed in epic.   FH- No anesthesia,bleeding / venous thrombosis ,  in family   Lives in an apartment complex- Help available post op     Physical Exam  Height 5' 11" (1.803 m), weight 77.3 kg (170 lb 8 oz).    Physical Exam  Constitutional- Vitals - Body mass index is 23.78 kg/m²., There were no vitals filed for this visit.  General appearance-Conscious,Coherent  Eyes- No conjunctival icterus,pupils  round   ENT-Oral cavity- moist  , Hearing grossly normal   Neck- No thyromegaly ,Trachea -central, No jugular venous distension,   No Carotid Bruit   Cardiovascular -Heart Sounds- Normal ,  no murmur  and  occasional irregularity suggestive of ectopy   , No gallop rhythm   Respiratory - Normal Respiratory Effort, Normal breath sounds, crepitations bases,  Crepitationsmid zones,  no wheeze  and  no forced expiratory wheeze    Peripheral pitting pedal edema-- none  and  varicose veins left lower extremity, no calf pain   Gastrointestinal -Soft abdomen, No palpable masses, Non Tender,Liver,Spleen not palpable. No-- free fluid and shifting dullness  Musculoskeletal- No finger Clubbing. Strength grossly normal   Lymphatic-No Palpable cervical, axillary,Inguinal lymphadenopathy   Psychiatric - normal effect,Orientation  Rt Dorsalis pedis pulses-palpable    Lt Dorsalis pedis pulses- palpable   Rt Posterior tibial pulses -palpable   Left posterior tibial pulses -palpable   Miscellaneous -  no asterixis,  no dupuytren's contracture,  Surgical scarabdomen   and  no renal bruit    Investigations  Lab and Imaging have been reviewed in [facility].    Review of Medicine tests    EKG- I had independently reviewed the EKG from--12/17/2018   It was reported to be showing     Sinus rhythm with Fusion complexes  Otherwise normal ECG  No previous ECGs available    12/17/[id number]-STA-[id number] findings suggestive of chronic interstitial lung disease    Aug 2018     Complete pulmonary function " studies were performed.  Spirometry   is within normal limits.  Total lung capacity is mildly reduced.    Diffusion is severely reduced.  Findings consistent with   interstitial lung disease/pulmonary fibrosis and the diffusion   has worsened compared to prior studies    Review of clinical lab tests:  Lab Results   Component Value Date    CREATININE 1.6 (H) 12/17/2018    HGB 14.2 12/17/2018     12/17/2018             Review of old records- Was done and information gathered regards to events leading to surgery and health conditions of significance in the perioperative period.        Preoperative cardiac risk assessment-  The patient does not have any active cardiac conditions . Revised cardiac risk index predictors-2 ---.Functional capacity is more than 4 Mets. He will be undergoing a open abdominal procedure that carries a high risk     The estimated risk of the rate of adverse cardiac outcomes  6.6%    No further cardiac work up is indicated prior to proceeding with the surgery          American Society of Anesthesiologists Physical status classification ( ASA ) class: 3     Postoperative pulmonary complication risk assessment:      ARISCAT ( Canet) risk index- risk class -  Low, if duration of surgery is under 2 hours, intermediate, if duration of surgery is over 2 hours      Kameron Respiratory failure index- percentage risk of respiratory failure: 4.2 %     [facility] post operative mortality risk in cirrhotic patients worked out     Probability of mortality     7 day- 3.0 %     30 day -12.0   %     90 day -- 18.6    %       MELD-Na score: 11 at 12/17/2018 10:45 AM  MELD score: 11 at 12/17/2018 10:45 AM  Calculated from:  Serum Creatinine: 1.6 mg/dL at 12/17/2018 10:45 AM  Serum Sodium: 140 mmol/L (Rounded to 137 mmol/L) at 12/17/2018 10:45 AM  Total Bilirubin: 0.4 mg/dL (Rounded to 1 mg/dL) at 12/17/2018 10:45 AM  INR(ratio): 0.9 (Rounded to 1) at 12/17/2018 10:45 AM  Age: 73 years    Assessment/Plan:      History of hepatitis C  Had treatment   No longer has Hep C , per him for about 2-3 years   Had history of snorting cocaine in the 80's  To his understanding , has cirrhosis , but no recent problem   No suggestion of cirrhosis hepatic decompensation            HCC (hepatocellular carcinoma)  For surgery         IPF (idiopathic pulmonary fibrosis)  Diagnosed around 2000  Was getting imaging for some other reason when he was found to have pulmonary fibrosis   Under pulmonology Dr Marrufo   Gets SOB on heavier exertion fort a long time     Does not feel SOB with day to day activities   No recent flare up   On Combivent nightly   No oxygen use   No suggestion of symptomatic advanced lung disease   Will review Pulmonology records     History of right-sided carotid endarterectomy  Many years ago   Was diagnosed with Carotid disease on evaluation of postural dizziness   To his understanding had 97 % Rt carotid disease that was operated   No CVA/TIA  As per him had about 30 % left carotid disease that he followed up and per him did not progressed   Currently not on scheduled ASA regimen , but takes dilip seltzer   No recent problem     Personal history of malignant neoplasm of prostate  2003   Was operated   No urinary hesitancy , urinary incontinence ( had it initially )     Calculus of gallbladder without cholecystitis without obstruction  No recent problem     Hypertension  On Amlodipine on occasions   If takes regularly,  has low BP  Suggested BP monitoring closer to surgery   Home BP readings -none  Recent BP readings in the record-130/70's  Hypertension-  Blood pressure is acceptable . I suggest continuation of -Amlodipine -- during the entire perioperative period.I suggest addressing pain control as uncontrolled pain can increased blood pressure     History of bladder cancer  S/P BCG  Under Urology follow up   No recent problem     Chronic bilateral low back pain with sciatica  Had pain management evaluation   On  long standing Opioid treatment      Tobacco abuse  Cutting down   Planning on quitting for surgery  Wheeze resolves with tobacco cessatoon    known to have  Emphysema  No oxygen use   Tobacco use-I  Informed about risk of wound healing problem ,infection,lung complications,thrombosis with tobacco use    I suggested to consider stopping  smoking tobacco for its benefits in the gurdeep operative period and in the long term    Relation of bladder cancer , emphysema, Vascular disease with tobacco discussed     Insomnia  On lunesta  And Flexeril       Chronic, continuous use of opioids  For about 3 years   Chronic continuous opioid use- In view of the opioid use, the patient may have opioid tolerance .I suggest considering the possibility of opioid tolerance  in planning post operative pain control     Acid reflux  On laying flat on his bed , feels acid reflux   Contributors - Coffee, tobacco, Aspirin   Ameena seltzer helps   I suggest aspiration precautions    Other emphysema  encouraged tobacco cessation     Chronic kidney disease, stage III (moderate)  Stages of CKD discussed  Creatinine 1.6   Deleterious effects NSAID's , Beneficial effects of Hydration discussed   Tylenol as needed for pain     I  suggest monitoring renal function, in put and out put status gurdeep-operatively. I  suggest avoiding nephrotoxic medication including NSAIDs, COX2 inhibitors, intravenous contrast agent,avoiding hypotension to prevent further renal impairment.     Asymptomatic varicose veins of left lower extremity  Increased risk of thrombosis in the gurdeep operative period , compression stocking use discussed    Postural dizziness  Gets dizzy on position changes on occasions   Suggested to change positions gradually and to stay hydrated         Preventive perioperative care    Thromboembolic prophylaxis:  His risk factors for thrombosis include tobacco use, surgical procedure and age.I suggest  thromboembolic prophylaxis (  mechanical/pharmacological, weighing the risk benefits of pharmacological agent use considering gurdeep procedural bleeding )  during the perioperative period.I suggested being active in the post operative period.      Postoperative pulmonary complication prophylaxis-Risk factors for post operative pulmonary complications include age over 65 years, ASA class >2, tobacco use, COPD and proximity of the surgical site to the lungs- I suggest tobacco smoking cessation, incentive spirometry use, early ambulation, end tidal carbon dioxide monitoring and pain control so as to avoid diaphragmatic splinting  , oral care , head end of bed elevation      Renal complication prophylaxis-Risk factors for renal complications include pre-existing renal disease, hypertension, vascular disease and cirrhosis . I suggest keeping him well hydrated and avoidance/ minimizing the use of  NSAID's,MIRANDA 2 Inhibitors ,IV contrast if possible in the perioperative period.I suggested drinking 2 litre's of water a day   Suggested avoidance of Gatorade      Surgical site Infection Prophylaxis-I  suggest appropriate antibiotic for Prophylaxis against Surgical site infections     Delirium prophylaxis-Risk factors - opioid use - I suggest avoidance / minimizing the use of  Benzodiazepines ( unless the patient has been taking it on a regular basis ),Anticholinergic medication,Antihistamines ( like  Benadryl).I suggest minimizing the use of opioid medication and use of IV tylenol,if it is appropriate. I suggest using the lowest possible dose of opioids for the shortest duration possible in the perioperative period. I suggest to Keep shades/blinds open during the day, lights off and shades closed at night to encourage normal sleep/wake cycle.I encourage the presence of the family member with the patient at all times, if at all possible as mental status changes can be picked up early by the family members and they help with reorientation. I encouraged the  "presence of family to help with orientation in the perioperative period. Benadryl avoidance suggested      This visit was focused on Preoperative evaluation, Perioperative Medical management, complication reduction plans. I suggest that the patient follows up with primary care or relevant sub specialists for ongoing health care.    I appreciate the opportunity to be involved in this patients care. Please feel free to contact me if there were any questions about this consultation.    Patient is optimized     Patient  was instructed to call and update me about any changes to health,  medication, office visits ,testing out side of the gurdeep operative care center , hospitalizations between now and surgery     Naheed Rapp MD  Perioperative Medicine  [facility]   Pager [phone]  ----  12/19- 14 34   BP (!) 147/82 Comment: lt  Pulse 70   Temp 97.5 °F (36.4 °C) (Oral)   Ht 5' 11" (1.803 m)   Wt 77.3 kg (170 lb 8 oz)   SpO2 99%   BMI 23.78 kg/m²    ----  12/19- 16 44  ----  12/21- 16 47     Called and spoke to him   Check BP daily   Goal BP   [id number]/<80  Manual -125-130/< 80  Call, if needed     ---  1/3/2019 - 1301    Called to follow up , spoke to him,   to address any concerns with the up coming surgery or any questions on Medication instructions -  Doing well ,No changes to Medication, Health -  -130/ 80's  Call, if needed     "

## 2018-12-19 NOTE — HPI
History of present illness- I had the pleasure of meeting this pleasant 73 y.o. gentleman in the pre op clinic prior to his elective Abdominal surgery. The patient is new to me .     I have obtained the history by speaking to the patient and by reviewing the electronic health records.    Events leading up to surgery / History of presenting illness -    HCC (hepatocellular carcinoma    Gets regular Pulmonology evaluations for pulmonary fibrosis   Had CT lung couple of months ago of the lung that picked up a liver abnormality   A liver mass was noted on  imaging.   CT guided biopsy was not diagnostic for HCC, but subsequent imaging with MRI was diagnostic for HCC    There is no evidence of extrahepatic disease .    Relevant health conditions of significance for the perioperative period/ History of presenting illness -    Subjectively describes health as good    His main issues are lung fibrosis , Liver , lumbar disc problem   Over all he feels good    Patient Active Problem List    Diagnosis Date Noted    Postural dizziness 12/21/2018    Hypertension 12/19/2018    History of bladder cancer 12/19/2018     Overview:   BCG Treatment      Chronic, continuous use of opioids 12/19/2018    Acid reflux 12/19/2018    Other emphysema 12/19/2018    Asymptomatic varicose veins of left lower extremity 12/19/2018    IPF (idiopathic pulmonary fibrosis) 12/18/2018    Personal history of malignant neoplasm of prostate 12/18/2018    History of right-sided carotid endarterectomy 12/18/2018    HCC (hepatocellular carcinoma) 12/04/2018    Chronic bilateral low back pain with sciatica 08/23/2018    Calculus of gallbladder without cholecystitis without obstruction 06/01/2016    History of hepatitis C 04/26/2016    Insomnia 12/22/2014    Chronic kidney disease, stage III (moderate) 12/22/2014    Tobacco abuse 08/07/2012     Not known to have heart disease , Diabetes Mellitus

## 2018-12-19 NOTE — ASSESSMENT & PLAN NOTE
Many years ago   Was diagnosed with Carotid disease on evaluation of postural dizziness   To his understanding had 97 % Rt carotid disease that was operated   No CVA/TIA  As per him had about 30 % left carotid disease that he followed up and per him did not progressed   Currently not on scheduled ASA regimen , but takes dilip hirsch   No recent problem

## 2018-12-19 NOTE — ASSESSMENT & PLAN NOTE
Stages of CKD discussed  Creatinine 1.6   Deleterious effects NSAID's , Beneficial effects of Hydration discussed   Tylenol as needed for pain     I  suggest monitoring renal function, in put and out put status gurdeep-operatively. I  suggest avoiding nephrotoxic medication including NSAIDs, COX2 inhibitors, intravenous contrast agent,avoiding hypotension to prevent further renal impairment.

## 2018-12-19 NOTE — OUTPATIENT SUBJECTIVE & OBJECTIVE
"Outpatient Subjective & Objective     Chief complaint-Preoperative evaluation, Perioperative Medical management, complication reduction plan     Active cardiac conditions- none    Revised cardiac risk index predictors- high-risk type of surgery and history of cerebrovascular disease    Functional capacity -Examples of physical activity, stays  fairly active , walks,    house work and can take 1 flight of stairs----- He can undertake all the above activities without  chest pain,chest tightness, Shortness of breath ,dizziness,lightheadedness making his exercise tolerance more  than 4 Mets.   Winded on heavier / faster exertion, not new ,stable over time     Review of Systems   Constitutional: Negative for chills and fever.        No unusual weight changes     HENT:        STOPBANG score 4 / 8    HTN  Age over 50 years  Neck size over 40 CM  Male gender   Eyes:        No unusual vision changes   Respiratory:           No change in color , consistency, amount of phlegm    No Hemoptysis   Cardiovascular:        As noted   Gastrointestinal:        Bowels- Regular - while on Lactuolose   No overt GI/ blood losses   Endocrine:        Prednisone use > 20 mg daily for 3 weeks- none    Genitourinary: Negative for dysuria.        No urinary hesitancy    Musculoskeletal:        As above      Neurological: Negative for syncope.        No unilateral weakness   Hematological:        Current use of Anticoagulants  None   Psychiatric/Behavioral:        No Depression,Anxiety       No vascular stenting   \  No past medical history pertinent negatives.        No anesthesia, bleeding, cardiac problems, PONV with previous surgeries/procedures.  Medications and Allergies reviewed in epic.   FH- No anesthesia,bleeding / venous thrombosis ,  in family   Lives in an apartment complex- Help available post op     Physical Exam  Height 5' 11" (1.803 m), weight 77.3 kg (170 lb 8 oz).    Physical Exam  Constitutional- Vitals - Body mass index is " 23.78 kg/m²., There were no vitals filed for this visit.  General appearance-Conscious,Coherent  Eyes- No conjunctival icterus,pupils  round   ENT-Oral cavity- moist  , Hearing grossly normal   Neck- No thyromegaly ,Trachea -central, No jugular venous distension,   No Carotid Bruit   Cardiovascular -Heart Sounds- Normal ,  no murmur  and  occasional irregularity suggestive of ectopy   , No gallop rhythm   Respiratory - Normal Respiratory Effort, Normal breath sounds, crepitations bases,  Crepitationsmid zones,  no wheeze  and  no forced expiratory wheeze    Peripheral pitting pedal edema-- none  and  varicose veins left lower extremity, no calf pain   Gastrointestinal -Soft abdomen, No palpable masses, Non Tender,Liver,Spleen not palpable. No-- free fluid and shifting dullness  Musculoskeletal- No finger Clubbing. Strength grossly normal   Lymphatic-No Palpable cervical, axillary,Inguinal lymphadenopathy   Psychiatric - normal effect,Orientation  Rt Dorsalis pedis pulses-palpable    Lt Dorsalis pedis pulses- palpable   Rt Posterior tibial pulses -palpable   Left posterior tibial pulses -palpable   Miscellaneous -  no asterixis,  no dupuytren's contracture,  Surgical scarabdomen   and  no renal bruit    Investigations  Lab and Imaging have been reviewed in [facility].    Review of Medicine tests    EKG- I had independently reviewed the EKG from--12/17/2018   It was reported to be showing     Sinus rhythm with Fusion complexes  Otherwise normal ECG  No previous ECGs available    12/17/[id number]-IUR-Gazqcthkkdsa findings suggestive of chronic interstitial lung disease    Aug 2018     Complete pulmonary function studies were performed.  Spirometry   is within normal limits.  Total lung capacity is mildly reduced.    Diffusion is severely reduced.  Findings consistent with   interstitial lung disease/pulmonary fibrosis and the diffusion   has worsened compared to prior studies    Review of clinical lab tests:  Lab Results    Component Value Date    CREATININE 1.6 (H) 12/17/2018    HGB 14.2 12/17/2018     12/17/2018             Review of old records- Was done and information gathered regards to events leading to surgery and health conditions of significance in the perioperative period.    Outpatient Subjective & Objective

## 2018-12-19 NOTE — PATIENT INSTRUCTIONS
Your surgery has been scheduled for:__Friday 1/4__     You should report to:  _____[facility], located on the [place] side of the first floor of the [facility] ([phone])  ___X___[facility], located on the [facility] side of the Second floor of the [facility] ([phone])  ______3rd Floor [facility] located on the [facility] side of the [facility] [phone]    Please Note  -Tell your doctors if you take asprin, products containing aspirin, herbal medications or blood thinners, such as Coumadin, Ticlid, or Plavix. (Consult your provider regarding holding or stopping before surgery).  -Arrange for someone to drive you home following surgery. You will not be allowed to leave the surgical facility alone or drive yourself home following sedation and anesthesia.      Outpatient Encounter Medications as of 12/19/2018   Medication Sig Note Dispense Refill    amLODIPine (NORVASC) 5 MG tablet Take 5 mg by mouth nightly as needed. 12/19/2018: Take as needed PM      aspirin-sod bicarb-citric acid (SHON-SELTZER) 324 mg TbEF Take 325 mg by mouth every 6 (six) hours as needed. 12/19/2018: Hold 7 days prior to surgery      COMBIVENT RESPIMAT  mcg/actuation inhaler every evening.  12/19/2018: Use as sched PM      cyclobenzaprine HCl (FLEXERIL ORAL) Take by mouth every evening.       eszopiclone (LUNESTA) 3 mg Tab Take 3 mg by mouth every evening. 12/19/2018: Take as sched PM      lactulose (CHRONULAC) 10 gram/15 mL solution Take by mouth as needed. 12/19/2018: Hold AM of surgery      morphine (MSIR) 15 MG tablet Take 15 mg by mouth every 8 (eight) hours as needed for Pain.  12/19/2018: Take as needed              sodium,potassium,&mag sulfates (SUPREP BOWEL PREP KIT) 17.5-3.13-1.6 gram SolR Take 1 Bottle by mouth as directed. 12/19/2018: Unsure about prep 1 Bottle 0                                     No  facility-administered encounter medications on file as of 12/19/2018.          Please review the instructions regarding your above listed medications.    Before Surgery  -Stop taking all herbal medications 14 days prior to surgery  -Stop taking aspirin, products containing aspirin 7 days before surgery  -Stop taking blood thinners   days before surgery  -Refrain from drinking alcoholic beverages for 24 hours before and after surgery  -Stop or limit smoking   days before surgery    Night before Surgery (or as per your Surgeon)  -Stop ALL solid food, gum, candy 8 hours before surgery/procedure time.  -Stop all CLOUDY liquids: coffee with creamer, tube feeds, cloudy juices, 6 hours prior to surgery/procedure time.  -CLEAR liquids include only water, black coffee NO creamer, clear oral rehydration drinks, clear sports drinks or clear fruit juices (no orange juice, no pulpy juices, no apple cider)  -IF IN DOUBT, drink water instead.   -Take a shower or bath (shower is recommended). Bathe with Hibiciens soap or an antibacterial soap from the neck down. If not supplied by your surgeon, Hibiciens soap will need to be purchased over the counter in the pharmacy. Rinse soap off thoroughly.  -Shampoo your hair with your regular shampoo    The Day of Surgery (or as per your Surgeon)  -The patient should be ENCOURAGED to drink carbohydrate-rich clear liquids (sports drinks, clear juices) until 2 hours prior to surgery/procedure time.  -NOTHING TO DRINK 2 hours before to surgery/procedure time. If you are told to take medication on the morning of surgery, it may be taken with a sip of water.   -Take another bath or shower with Hibiciens or any antibacterial soap, to reduce the chance of infection.  -Take heart and blood pressure medications with a small sip of water, as advised by the perioperative team.  -Do not take fluid pills  -You may brush your teeth and rinse your mouth, but do not swallow any additional water.  -Do not  apply perfumes, powder, body lotions, or deodorant on the day of surgery.  -Nail polish should be removed  -Do not wear makeup or moisturizer  -Wear comfortable clothes, such as a button front shirt and loose fitting pants.  -Leave all jewelry, including body piercings, and valuables at home.  -Bring any devices you will need after surgery such as crutches or canes.  -If you have sleep apnea, please bring your CPAP machine    In the event of your physical conditions including the onset of a cold or respiratory illness, or if you have to delay or cancel your surgery, please notify your surgeon.     If you have any questions or concerns, please don't hesitate to call.    Sincerely,    Mary [phone]              Tips to Control Acid Reflux    To control acid reflux, youll need to make some basic diet and lifestyle changes. The simple steps outlined below may be all youll need to ease discomfort.  Watch what you eat  · Avoid fatty foods and spicy foods.  · Eat fewer acidic foods, such as citrus and tomato-based foods. These can increase symptoms.  · Limit drinking alcohol, caffeine, and fizzy beverages. All increase acid reflux.  · Try limiting chocolate, peppermint, and spearmint. These can worsen acid reflux in some people.  Watch when you eat  · Avoid lying down for 3 hours after eating.  · Do not snack before going to bed.  Raise your head  Raising your head and upper body by 4 to 6 inches helps limit reflux when youre lying down. Put blocks under the head of your bed frame to raise it.  Other changes  · Lose weight, if you need to  · Dont exercise near bedtime  · Avoid tight-fitting clothes  · Limit aspirin and ibuprofen  · Stop smoking   Date Last Reviewed: 7/1/2016  © 2308-3721 "[employer]". [address] [place] [address], [place] [address]. All rights reserved. This information is not intended as a substitute for professional medical care. Always follow your healthcare professional's  instructions.

## 2018-12-19 NOTE — ASSESSMENT & PLAN NOTE
Had treatment   No longer has Hep C , per him for about 2-3 years   Had history of snorting cocaine in the 80's  To his understanding , has cirrhosis , but no recent problem   No suggestion of cirrhosis hepatic decompensation

## 2018-12-19 NOTE — LETTER
December 19, 2018      Edwar Fulton MD  [address] [facility] [address]           [facility]  [address] [facility] [address]  Phone: [phone]          Patient: Shahab Pompa   MR Number: [id number]   YOB: 1945   Date of Visit: 12/19/2018       Dear Dr. Edwar Fulton:    Thank you for referring Shahab Pompa to me for evaluation. Attached you will find relevant portions of my assessment and plan of care.    If you have questions, please do not hesitate to call me. I look forward to following Shahab Pompa along with you.    Sincerely,    Naheed Rapp MD    Enclosure  CC:  Blake Escobar MD    If you would like to receive this communication electronically, please contact [email or web address] or [phone] to request more information on [employer] and [employer] access.    For providers and/or their staff who would like to refer a patient to [facility], please contact us through our one-stop-shop provider referral line, [facility], at [phone].    If you feel you have received this communication in error or would no longer like to receive these types of communications, please e-mail [email or web address]

## 2018-12-21 PROBLEM — R42 POSTURAL DIZZINESS: Status: ACTIVE | Noted: 2018-12-21

## 2018-12-21 RX ORDER — MOMETASONE FUROATE 1 MG/G
CREAM TOPICAL
COMMUNITY

## 2018-12-21 RX ORDER — DESONIDE 0.5 MG/ML
LOTION TOPICAL
COMMUNITY

## 2018-12-21 NOTE — ASSESSMENT & PLAN NOTE
Gets dizzy on position changes on occasions   Suggested to change positions gradually and to stay hydrated

## 2019-01-03 ENCOUNTER — ANESTHESIA EVENT (OUTPATIENT)
Dept: SURGERY | Facility: HOSPITAL | Age: 74
DRG: 422 | End: 2019-01-03
Payer: MEDICARE

## 2019-01-03 NOTE — ANESTHESIA PREPROCEDURE EVALUATION
[facility]  Anesthesia Pre-Operative Evaluation         Patient Name: Shahab Pompa  YOB: 1945  MRN: [id number]    SUBJECTIVE:     Pre-operative evaluation for Procedure(s) (LRB):  LAPAROTOMY, EXPLORATORY (N/A)  EXCISION, LIVER- OPEN RIGHT HEPATECTOMY (Right)     01/03/2019    Shahab Pompa is a 73 y.o. male w/ a significant PMHx of HCV (treated, currently has SVR), recently diagnosed HCC, GERD (unable to lay flat without feeling symptoms), IPF, emphysema, HTN, tobacco abuse, chronic low back pain, chronic opioid therapy, prior carotid endarterectomy,  CKD stage 3 (creatinine 1.6), and prostate cancer/bladder cancer who now presents for the above procedure(s).    Of note he recently had complete pulmonary function studies which showed slightly reduced total lung capacity with diffusion capacity being severely reduced.This was interval worsening of his interstitial lung disease/pulmonary fibrosis. He gets SOB w/ prolonged exercise but not with daily activities. He is currently not on oxygen therapy.     LDA: Peripheral IV.     Prev airway: None documented.    Drips: Normal Saline.       Patient Active Problem List   Diagnosis    History of hepatitis C    Calculus of gallbladder without cholecystitis without obstruction    HCC (hepatocellular carcinoma)    IPF (idiopathic pulmonary fibrosis)    Personal history of malignant neoplasm of prostate    History of right-sided carotid endarterectomy    Hypertension    Insomnia    History of bladder cancer    Chronic bilateral low back pain with sciatica    Tobacco abuse    Chronic, continuous use of opioids    Acid reflux    Other emphysema    Chronic kidney disease, stage III (moderate)    Asymptomatic varicose veins of left lower extremity    Postural dizziness       Review of patient's allergies indicates:  No Known Allergies    Current Inpatient Medications:      No current facility-administered medications on file  prior to encounter.      Current Outpatient Medications on File Prior to Encounter   Medication Sig Dispense Refill    COMBIVENT RESPIMAT  mcg/actuation inhaler every evening.       morphine (MSIR) 15 MG tablet Take 15 mg by mouth every 8 (eight) hours as needed for Pain.       sodium,potassium,&mag sulfates (SUPREP BOWEL PREP KIT) 17.5-3.13-1.6 gram SolR Take 1 Bottle by mouth as directed. 1 Bottle 0       Past Surgical History:   Procedure Laterality Date    BLADDER SURGERY      cancer removal    CAROTID ENDARTERECTOMY Right     INGUINAL HERNIA REPAIR Bilateral     KNEE SURGERY      PROSTATE SURGERY      cancer removal       Social History     Socioeconomic History    Marital status:      Spouse name: Not on file    Number of children: Not on file    Years of education: Not on file    Highest education level: Not on file   Social Needs    Financial resource strain: Not on file    Food insecurity - worry: Not on file    Food insecurity - inability: Not on file    Transportation needs - medical: Not on file    Transportation needs - non-medical: Not on file   Occupational History    Not on file   Tobacco Use    Smoking status: Current Every Day Smoker     Packs/day: 0.50     Years: 30.00     Pack years: 15.00    Smokeless tobacco: Never Used   Substance and Sexual Activity    Alcohol use: No    Drug use: No    Sexual activity: Not Currently   Other Topics Concern    Not on file   Social History Narrative    Not on file       OBJECTIVE:     Vital Signs Range (Last 24H):         Significant Labs:  Lab Results   Component Value Date    WBC 8.50 2018    HGB 14.2 2018    HCT 42.9 2018     2018    ALT 18 2018    AST 35 2018     2018    K 5.1 2018     2018    CREATININE 1.6 (H) 2018    BUN 16 2018    CO2 26 2018    INR 0.9 2018       Diagnostic Studies:     EK/17/18    Test Reason :  Z01.818,  Vent. Rate : 077 BPM     Atrial Rate : 077 BPM     P-R Int : 178 ms          QRS Dur : 082 ms      QT Int : 386 ms       P-R-T Axes : -20 008 021 degrees     QTc Int : 436 ms    Sinus rhythm with Fusion complexes  Otherwise normal ECG  No previous ECGs available  Confirmed by ÁNGEL WINSLOW MD ([id number]) on 12/17/2018 1:25:41 PM    Referred By: TISHA MCGINNIS           Confirmed By:ÁNGEL WINSLOW MD    2D ECHO:  No results found for this or any previous visit.      ASSESSMENT/PLAN:         Anesthesia Evaluation    I have reviewed the Patient Summary Reports.    I have reviewed the Nursing Notes.   I have reviewed the Medications.     Review of Systems  Anesthesia Hx:  History of prior surgery of interest to airway management or planning: Denies Family Hx of Anesthesia complications.   Denies Personal Hx of Anesthesia complications.   Social:  Smoker    Hematology/Oncology:        Current/Recent Cancer. -- Cancer in past history:    Cardiovascular:   Hypertension    Pulmonary:   COPD IPD   Renal/:   Chronic Renal Disease, CRI    Hepatic/GI:   Liver Disease, Hepatitis, C    Musculoskeletal:  Spine Disorders: lumbar Chronic Pain    Neurological:   Neuromuscular Disease,   Chronic Pain Syndrome   Endocrine:  Endocrine Normal    Dermatological:  Skin Normal    Psych:  Psychiatric Normal           Physical Exam  General:  Well nourished    Airway/Jaw/Neck:  Airway Findings: Mouth Opening: Normal Tongue: Normal  General Airway Assessment: Adult  Mallampati: II  TM Distance: Normal, at least 6 cm  Jaw/Neck Findings:  Neck ROM: Normal ROM  Neck Findings: Normal    Eyes/Ears/Nose:  EYES/EARS/NOSE FINDINGS: Normal   Dental:  Dental Findings: Periodontal disease, Mild   Chest/Lungs:  Chest/Lungs Findings: Decreased Breathe Sounds Left, Decreased Breathe Sounds Right, Normal Respiratory Rate     Heart/Vascular:  Heart Findings: Rate: Normal  Rhythm: Regular Rhythm  Sounds: Normal  Heart murmur: negative Vascular Findings: Normal     Abdomen:  Abdomen Findings: Normal    Musculoskeletal:  Musculoskeletal Findings: Normal   Skin:  Skin Findings: Normal    Mental Status:  Mental Status Findings:  Cooperative, Alert and Oriented         Anesthesia Plan  Type of Anesthesia, risks & benefits discussed:  Anesthesia Type:  general  Patient's Preference:   Intra-op Monitoring Plan: arterial line, central line and standard ASA monitors  Intra-op Monitoring Plan Comments:   Post Op Pain Control Plan: multimodal analgesia and per primary service following discharge from PACU  Post Op Pain Control Plan Comments:   Induction:   IV  Beta Blocker:  Patient is not currently on a Beta-Blocker (No further documentation required).       Informed Consent: Patient understands risks and agrees with Anesthesia plan.  Questions answered. Anesthesia consent signed with patient.  ASA Score: 3     Day of Surgery Review of History & Physical:  There are no significant changes.  H&P update referred to the surgeon.         Ready For Surgery From Anesthesia Perspective.

## 2019-01-04 ENCOUNTER — HOSPITAL ENCOUNTER (OUTPATIENT)
Facility: HOSPITAL | Age: 74
Discharge: HOME | DRG: 422 | End: 2019-01-04
Attending: TRANSPLANT SURGERY | Admitting: TRANSPLANT SURGERY
Payer: MEDICARE

## 2019-01-04 ENCOUNTER — ANESTHESIA (OUTPATIENT)
Dept: SURGERY | Facility: HOSPITAL | Age: 74
DRG: 422 | End: 2019-01-04
Payer: MEDICARE

## 2019-01-04 VITALS
HEIGHT: 71 IN | SYSTOLIC BLOOD PRESSURE: 131 MMHG | BODY MASS INDEX: 23.8 KG/M2 | HEART RATE: 75 BPM | DIASTOLIC BLOOD PRESSURE: 78 MMHG | WEIGHT: 170 LBS | OXYGEN SATURATION: 99 % | TEMPERATURE: 98 F | RESPIRATION RATE: 18 BRPM

## 2019-01-04 DIAGNOSIS — C22.0 HCC (HEPATOCELLULAR CARCINOMA): Primary | ICD-10-CM

## 2019-01-04 PROCEDURE — 37000009 HC ANESTHESIA EA ADD 15 MINS: Performed by: TRANSPLANT SURGERY

## 2019-01-04 PROCEDURE — 12000002 HC ACUTE/MED SURGE SEMI-PRIVATE ROOM

## 2019-01-04 PROCEDURE — 36000709 HC OR TIME LEV III EA ADD 15 MIN: Performed by: TRANSPLANT SURGERY

## 2019-01-04 PROCEDURE — D9220A PRA ANESTHESIA: ICD-10-PCS | Mod: ANES,,, | Performed by: ANESTHESIOLOGY

## 2019-01-04 PROCEDURE — 37000008 HC ANESTHESIA 1ST 15 MINUTES: Performed by: TRANSPLANT SURGERY

## 2019-01-04 PROCEDURE — G0378 HOSPITAL OBSERVATION PER HR: HCPCS

## 2019-01-04 PROCEDURE — 25000003 PHARM REV CODE 250: Performed by: PEDIATRICS

## 2019-01-04 PROCEDURE — 27201423 OPTIME MED/SURG SUP & DEVICES STERILE SUPPLY: Performed by: TRANSPLANT SURGERY

## 2019-01-04 PROCEDURE — 49320 DIAG LAPARO SEPARATE PROC: CPT | Mod: ,,, | Performed by: TRANSPLANT SURGERY

## 2019-01-04 PROCEDURE — D9220A PRA ANESTHESIA: ICD-10-PCS | Mod: CRNA,,, | Performed by: NURSE ANESTHETIST, CERTIFIED REGISTERED

## 2019-01-04 PROCEDURE — 63600175 PHARM REV CODE 636 W HCPCS: Performed by: NURSE ANESTHETIST, CERTIFIED REGISTERED

## 2019-01-04 PROCEDURE — 63600175 PHARM REV CODE 636 W HCPCS: Performed by: ANESTHESIOLOGY

## 2019-01-04 PROCEDURE — 63600175 PHARM REV CODE 636 W HCPCS

## 2019-01-04 PROCEDURE — 36000708 HC OR TIME LEV III 1ST 15 MIN: Performed by: TRANSPLANT SURGERY

## 2019-01-04 PROCEDURE — 71000033 HC RECOVERY, INTIAL HOUR: Performed by: TRANSPLANT SURGERY

## 2019-01-04 PROCEDURE — 25000003 PHARM REV CODE 250: Performed by: STUDENT IN AN ORGANIZED HEALTH CARE EDUCATION/TRAINING PROGRAM

## 2019-01-04 PROCEDURE — S0020 INJECTION, BUPIVICAINE HYDRO: HCPCS | Performed by: TRANSPLANT SURGERY

## 2019-01-04 PROCEDURE — 25000003 PHARM REV CODE 250: Performed by: NURSE ANESTHETIST, CERTIFIED REGISTERED

## 2019-01-04 PROCEDURE — D9220A PRA ANESTHESIA: Mod: ANES,,, | Performed by: ANESTHESIOLOGY

## 2019-01-04 PROCEDURE — D9220A PRA ANESTHESIA: Mod: CRNA,,, | Performed by: NURSE ANESTHETIST, CERTIFIED REGISTERED

## 2019-01-04 PROCEDURE — 71000015 HC POSTOP RECOV 1ST HR: Performed by: TRANSPLANT SURGERY

## 2019-01-04 PROCEDURE — 49320 PR LAP,DIAGNOSTIC ABDOMEN: ICD-10-PCS | Mod: ,,, | Performed by: TRANSPLANT SURGERY

## 2019-01-04 PROCEDURE — 25000003 PHARM REV CODE 250: Performed by: TRANSPLANT SURGERY

## 2019-01-04 PROCEDURE — 94761 N-INVAS EAR/PLS OXIMETRY MLT: CPT

## 2019-01-04 PROCEDURE — 86920 COMPATIBILITY TEST SPIN: CPT

## 2019-01-04 PROCEDURE — 71000039 HC RECOVERY, EACH ADD'L HOUR: Performed by: TRANSPLANT SURGERY

## 2019-01-04 RX ORDER — HYDROMORPHONE HYDROCHLORIDE 1 MG/ML
0.2 INJECTION, SOLUTION INTRAMUSCULAR; INTRAVENOUS; SUBCUTANEOUS EVERY 5 MIN PRN
Status: DISCONTINUED | OUTPATIENT
Start: 2019-01-04 | End: 2019-01-04 | Stop reason: HOSPADM

## 2019-01-04 RX ORDER — SODIUM CHLORIDE 9 MG/ML
INJECTION, SOLUTION INTRAVENOUS CONTINUOUS
Status: DISCONTINUED | OUTPATIENT
Start: 2019-01-04 | End: 2019-01-04 | Stop reason: HOSPADM

## 2019-01-04 RX ORDER — ROCURONIUM BROMIDE 10 MG/ML
INJECTION, SOLUTION INTRAVENOUS
Status: DISCONTINUED | OUTPATIENT
Start: 2019-01-04 | End: 2019-01-04

## 2019-01-04 RX ORDER — FENTANYL CITRATE 50 UG/ML
INJECTION, SOLUTION INTRAMUSCULAR; INTRAVENOUS
Status: DISCONTINUED | OUTPATIENT
Start: 2019-01-04 | End: 2019-01-04

## 2019-01-04 RX ORDER — AMOXICILLIN 250 MG
1 CAPSULE ORAL 2 TIMES DAILY
Status: DISCONTINUED | OUTPATIENT
Start: 2019-01-04 | End: 2019-01-04 | Stop reason: HOSPADM

## 2019-01-04 RX ORDER — SODIUM CHLORIDE 9 MG/ML
INJECTION, SOLUTION INTRAVENOUS CONTINUOUS
Status: DISCONTINUED | OUTPATIENT
Start: 2019-01-04 | End: 2019-01-04

## 2019-01-04 RX ORDER — HYDROCODONE BITARTRATE AND ACETAMINOPHEN 500; 5 MG/1; MG/1
TABLET ORAL
Status: DISCONTINUED | OUTPATIENT
Start: 2019-01-04 | End: 2019-01-04

## 2019-01-04 RX ORDER — MIDAZOLAM HYDROCHLORIDE 5 MG/ML
INJECTION INTRAMUSCULAR; INTRAVENOUS
Status: DISCONTINUED | OUTPATIENT
Start: 2019-01-04 | End: 2019-01-04

## 2019-01-04 RX ORDER — ONDANSETRON HYDROCHLORIDE 2 MG/ML
INJECTION, SOLUTION INTRAMUSCULAR; INTRAVENOUS
Status: DISCONTINUED | OUTPATIENT
Start: 2019-01-04 | End: 2019-01-04

## 2019-01-04 RX ORDER — LIDOCAINE HYDROCHLORIDE 10 MG/ML
1 INJECTION, SOLUTION EPIDURAL; INFILTRATION; INTRACAUDAL; PERINEURAL ONCE
Status: COMPLETED | OUTPATIENT
Start: 2019-01-04 | End: 2019-01-04

## 2019-01-04 RX ORDER — HYDROMORPHONE HYDROCHLORIDE 1 MG/ML
INJECTION, SOLUTION INTRAMUSCULAR; INTRAVENOUS; SUBCUTANEOUS
Status: COMPLETED
Start: 2019-01-04 | End: 2019-01-04

## 2019-01-04 RX ORDER — EPHEDRINE SULFATE 50 MG/ML
INJECTION, SOLUTION INTRAVENOUS
Status: DISCONTINUED | OUTPATIENT
Start: 2019-01-04 | End: 2019-01-04

## 2019-01-04 RX ORDER — DEXAMETHASONE SODIUM PHOSPHATE 4 MG/ML
INJECTION, SOLUTION INTRA-ARTICULAR; INTRALESIONAL; INTRAMUSCULAR; INTRAVENOUS; SOFT TISSUE
Status: DISCONTINUED | OUTPATIENT
Start: 2019-01-04 | End: 2019-01-04

## 2019-01-04 RX ORDER — BUPIVACAINE HYDROCHLORIDE 5 MG/ML
INJECTION, SOLUTION EPIDURAL; INTRACAUDAL
Status: DISCONTINUED | OUTPATIENT
Start: 2019-01-04 | End: 2019-01-04 | Stop reason: HOSPADM

## 2019-01-04 RX ORDER — GLYCOPYRROLATE 0.2 MG/ML
INJECTION INTRAMUSCULAR; INTRAVENOUS
Status: DISCONTINUED | OUTPATIENT
Start: 2019-01-04 | End: 2019-01-04

## 2019-01-04 RX ORDER — LIDOCAINE HCL/PF 100 MG/5ML
SYRINGE (ML) INTRAVENOUS
Status: DISCONTINUED | OUTPATIENT
Start: 2019-01-04 | End: 2019-01-04

## 2019-01-04 RX ORDER — CEFAZOLIN SODIUM 1 G/3ML
INJECTION, POWDER, FOR SOLUTION INTRAMUSCULAR; INTRAVENOUS
Status: DISCONTINUED | OUTPATIENT
Start: 2019-01-04 | End: 2019-01-04

## 2019-01-04 RX ORDER — OXYCODONE HYDROCHLORIDE 5 MG/1
5 CAPSULE ORAL EVERY 6 HOURS PRN
Qty: 20 CAPSULE | Refills: 0 | Status: ON HOLD | OUTPATIENT
Start: 2019-01-04 | End: 2019-04-29 | Stop reason: CLARIF

## 2019-01-04 RX ORDER — PROPOFOL 10 MG/ML
VIAL (ML) INTRAVENOUS
Status: DISCONTINUED | OUTPATIENT
Start: 2019-01-04 | End: 2019-01-04

## 2019-01-04 RX ORDER — HYDROCODONE BITARTRATE AND ACETAMINOPHEN 5; 325 MG/1; MG/1
1 TABLET ORAL EVERY 4 HOURS PRN
Status: DISCONTINUED | OUTPATIENT
Start: 2019-01-04 | End: 2019-01-04 | Stop reason: HOSPADM

## 2019-01-04 RX ADMIN — ROCURONIUM BROMIDE 5 MG: 10 INJECTION, SOLUTION INTRAVENOUS at 11:01

## 2019-01-04 RX ADMIN — HYDROMORPHONE HYDROCHLORIDE 0.2 MG: 1 INJECTION, SOLUTION INTRAMUSCULAR; INTRAVENOUS; SUBCUTANEOUS at 12:01

## 2019-01-04 RX ADMIN — PROPOFOL 150 MG: 10 INJECTION, EMULSION INTRAVENOUS at 10:01

## 2019-01-04 RX ADMIN — ROCURONIUM BROMIDE 35 MG: 10 INJECTION, SOLUTION INTRAVENOUS at 10:01

## 2019-01-04 RX ADMIN — SODIUM CHLORIDE: 0.9 INJECTION, SOLUTION INTRAVENOUS at 09:01

## 2019-01-04 RX ADMIN — ONDANSETRON 4 MG: 2 INJECTION, SOLUTION INTRAMUSCULAR; INTRAVENOUS at 11:01

## 2019-01-04 RX ADMIN — LIDOCAINE HYDROCHLORIDE 0.2 MG: 10 INJECTION, SOLUTION EPIDURAL; INFILTRATION; INTRACAUDAL at 09:01

## 2019-01-04 RX ADMIN — ROCURONIUM BROMIDE 10 MG: 10 INJECTION, SOLUTION INTRAVENOUS at 11:01

## 2019-01-04 RX ADMIN — SUGAMMADEX 200 MG: 100 INJECTION, SOLUTION INTRAVENOUS at 11:01

## 2019-01-04 RX ADMIN — SODIUM CHLORIDE, SODIUM GLUCONATE, SODIUM ACETATE, POTASSIUM CHLORIDE, MAGNESIUM CHLORIDE, SODIUM PHOSPHATE, DIBASIC, AND POTASSIUM PHOSPHATE: .53; .5; .37; .037; .03; .012; .00082 INJECTION, SOLUTION INTRAVENOUS at 11:01

## 2019-01-04 RX ADMIN — MIDAZOLAM 2 MG: 5 INJECTION INTRAMUSCULAR; INTRAVENOUS at 10:01

## 2019-01-04 RX ADMIN — FENTANYL CITRATE 50 MCG: 50 INJECTION, SOLUTION INTRAMUSCULAR; INTRAVENOUS at 11:01

## 2019-01-04 RX ADMIN — FENTANYL CITRATE 100 MCG: 50 INJECTION, SOLUTION INTRAMUSCULAR; INTRAVENOUS at 10:01

## 2019-01-04 RX ADMIN — DOCUSATE SODIUM AND SENNOSIDES 1 TABLET: 8.6; 5 TABLET, FILM COATED ORAL at 01:01

## 2019-01-04 RX ADMIN — DEXAMETHASONE SODIUM PHOSPHATE 4 MG: 4 INJECTION, SOLUTION INTRAMUSCULAR; INTRAVENOUS at 11:01

## 2019-01-04 RX ADMIN — EPHEDRINE SULFATE 10 MG: 50 INJECTION, SOLUTION INTRAMUSCULAR; INTRAVENOUS; SUBCUTANEOUS at 10:01

## 2019-01-04 RX ADMIN — HYDROCODONE BITARTRATE AND ACETAMINOPHEN 1 TABLET: 5; 325 TABLET ORAL at 12:01

## 2019-01-04 RX ADMIN — CEFAZOLIN 2 G: 330 INJECTION, POWDER, FOR SOLUTION INTRAMUSCULAR; INTRAVENOUS at 11:01

## 2019-01-04 RX ADMIN — GLYCOPYRROLATE 0.2 MG: 0.2 INJECTION, SOLUTION INTRAMUSCULAR; INTRAVENOUS at 10:01

## 2019-01-04 RX ADMIN — LIDOCAINE HYDROCHLORIDE 75 MG: 20 INJECTION, SOLUTION INTRAVENOUS at 10:01

## 2019-01-04 NOTE — TRANSFER OF CARE
"Anesthesia Transfer of Care Note    Patient: Shahab Pompa    Procedure(s) Performed: Procedure(s) (LRB):  LAPAROSCOPY Exploratory (N/A)    Patient location: PACU    Anesthesia Type: general    Transport from OR: Transported from OR on room air with adequate spontaneous ventilation    Post pain: adequate analgesia    Post assessment: no apparent anesthetic complications and tolerated procedure well    Post vital signs: stable    Level of consciousness: awake    Nausea/Vomiting: no nausea/vomiting    Complications: none    Transfer of care protocol was followed      Last vitals:   Visit Vitals  /61 (BP Location: Right arm, Patient Position: Sitting)   Pulse 79   Temp 36.2 °C (97.1 °F) (Oral)   Resp 18   Ht 5' 11" (1.803 m)   Wt 77.1 kg (170 lb)   SpO2 99%   BMI 23.71 kg/m²     "

## 2019-01-04 NOTE — DISCHARGE SUMMARY
[facility]  General Surgery  Discharge Summary      Patient Name: Shahab Pompa  MRN: [id number]  Admission Date: 1/4/2019  Hospital Length of Stay: 0 days  Discharge Date and Time:  01/04/2019 3:39 PM  Attending Physician: Edwar Fulton MD   Discharging Provider: Edwar Fulton MD   Primary Care Provider: Blake Escobar MD    HPI:   73 M referred for management of hepatic mass. Mr Pompa has has a history of hepatitis C (treated, SVR), prostate cancer and IPF. A liver mass was noted on surveillance imaging. CT guided biopsy was not diagnostic for HCC, but subsequent imaging with MRI was diagnostic for HCC with enhancement and washout with pseudocapsule. There is no evidence of extrahepatic disease and his liver function and platelets are normal. He otherwise is feeling well with no complaints.      Procedure(s) (LRB):  LAPAROSCOPY Exploratory (N/A)      Indwelling Lines/Drains at time of discharge:   Lines/Drains/Airways     Drain                 Urethral Catheter 01/04/19 1050 Double-lumen;Non-latex;Straight-tip 16 Fr. less than 1 day              Hospital Course: Pt with above HPI presented for diagnostic laparoscopy and possible excision of right lobar lesion consistent with HCC. Excision was abandoned due to advanced cirrhosis. Pt was discharged in stable condition on POD 0 having met all discharge criteria. Referrals have been made to [facility] Dr. Greene for evaluation of liver transplant and to IR Dr. Calero for potential locoregional therapies.       Consults:     Significant Diagnostic Studies: As above     Pending Diagnostic Studies:     None        Final Active Diagnoses:    Diagnosis Date Noted POA    HCC (hepatocellular carcinoma) [C22.0] 12/04/2018 Yes      Problems Resolved During this Admission:      Discharged Condition: stable    Disposition: Home or Self Care    Follow Up:  Follow-up Information     Edwar Fulton MD In 2 weeks.    Specialty:  Transplant  Why:  Call clinic for  follow up apointment in 2-3 weeks   Contact information:  [address]  [facility] [address]  [phone]                 Patient Instructions:      Diet Adult Regular     Notify your health care provider if you experience any of the following:  temperature >100.4     Notify your health care provider if you experience any of the following:  persistent nausea and vomiting or diarrhea     Notify your health care provider if you experience any of the following:  severe uncontrolled pain     Remove dressing in 48 hours   Order Comments: Okay to shower once dressings are removed. Then leave open to air.     Activity as tolerated     Medications:  Reconciled Home Medications:      Medication List      START taking these medications    oxyCODONE 5 mg Cap  Commonly known as:  OXY-IR  Take 1 capsule (5 mg total) by mouth every 6 (six) hours as needed for Pain.        CONTINUE taking these medications    amLODIPine 5 MG tablet  Commonly known as:  NORVASC  Take 5 mg by mouth nightly as needed.     aspirin-sod bicarb-citric acid 324 mg Tbef  Commonly known as:  SHON-SELTZER  Take 325 mg by mouth every 6 (six) hours as needed.     COMBIVENT RESPIMAT  mcg/actuation inhaler  Generic drug:  ipratropium-albuterol  every evening.     * desonide 0.05 % cream  Commonly known as:  DESOWEN  Apply topically as needed.     * desonide 0.05 % lotion  Commonly known as:  DESOWEN  Apply topically. Once in 2 days for rash     FLEXERIL ORAL  Take by mouth every evening.     lactulose 10 gram/15 mL solution  Commonly known as:  CHRONULAC  Take by mouth as needed.     LUNESTA 3 mg Tab  Generic drug:  eszopiclone  Take 3 mg by mouth every evening.     mometasone 0.1% 0.1 % cream  Commonly known as:  ELOCON  Apply topically as needed (rash).     morphine 15 MG tablet  Commonly known as:  MSIR  Take 15 mg by mouth every 8 (eight) hours as needed for Pain.         * This list has 2 medication(s) that are the same as other medications  prescribed for you. Read the directions carefully, and ask your doctor or other care provider to review them with you.            STOP taking these medications    sodium,potassium,mag sulfates 17.5-3.13-1.6 gram Solr  Commonly known as:  SUPREP BOWEL PREP KIT              Neil Siegel MD  General Surgery  PGY-2  [facility]

## 2019-01-04 NOTE — H&P
H&P completed on 12/4/2018 has been reviewed, the patient has been examined and:  I concur with the findings and no changes have occurred since H&P was written.    There are no hospital problems to display for this patient.

## 2019-01-04 NOTE — PROGRESS NOTES
0900-Spoke with neelima in blood bank who stated blood bank has a blood sample taken from pt this morning for type and screen.

## 2019-01-04 NOTE — PLAN OF CARE
Pt is AAOx4. VSS. NAD. IVs discontinued. States pain tolerable, denies nausea. Discharge instructions given, verbalized understanding.  Resident spoke with patient. Discharged home with family.

## 2019-01-04 NOTE — DISCHARGE INSTRUCTIONS
Diagnostic laparoscopy performed, liver has advanced cirrhosis. Not a candidate to proceed with surgical resection. Will refer to Dr Greene in hepatology ([place]) to discuss candidacy for transplant and Dr Calero (Interventional Radiology, [place]) for possible radiation or chemotherapy beads for treatment of the tumor. There was no evidence that the tumor had spread outside of the liver.      Incision Care  Remember: Follow-up visits allow your healthcare provider to make sure your incision is healing well. Be sure to keep your appointments.     Stitches (sutures), surgical staples, special strips of surgical tape, or surgical skin glue may be used to close incisions. They also help stop bleeding and speed healing. To help your incision heal, follow the tips on this handout.  Home care  Tips for home care include the following:  · Always wash your hands before touching your incision.  · Keep your incision clean and dry.  · Avoid doing things that could cause dirt or sweat to get on your incision.  · Dont pick at scabs. They help protect the wound.  · Keep your incision out of water.  · Take a sponge bath to avoid getting your incision wet, unless your healthcare provider tells you otherwise.  · Ask your provider when can you take a shower or bathe.  · Ask your provider about the best way to keep your incision dry when bathing or showering.  · Pat stitches dry if they get wet. Dont rub.  · Leave the bandage (dressing) in place until you are told to remove it or change it. Change it only as directed, using clean hands.  · After the first 12 hours, change your dressing every 24 hours, or as directed by your healthcare provider.  · Change your dressing if it gets wet or soiled.  Care for specific closures  Follow these guidelines unless your healthcare provider tells you otherwise:  · Stitches or staples. Once you no longer need to keep these dry, clean the wound daily. First remove the bandage using clean  hands. Then wash the area gently with soap and warm water. Use a wet cotton swab to loosen and remove any blood or crust that forms. After cleaning, put a thin layer of antibiotic ointment on. Then put on a new bandage.  · Skin glue. Dont put liquid, ointment, or cream on your wound while the glue is in place. Avoid activities that cause heavy sweating. Protect the wound from sunlight. Do not scratch, rub, or pick at the glue. Do not put tape directly over the glue. The glue should peel off within 5 to 10 days.  · Surgical tape. Keep the area dry. If it gets wet, blot the area dry with a clean towel. Surgical tape usually falls off within 7 to 10 days. If it has not fallen off after 10 days, contact your healthcare provider before taking it off yourself. If you are told to remove the tape, put mineral oil or petroleum jelly on a cotton ball. Gently rub the tape until it is removed.  Changing your dressing  Leave the dressing (bandage) in place until you are told to remove it or change it. Follow the instructions below unless told otherwise by your healthcare provider:  · Always wash your hands before changing your dressing.  · After the first 48 hours, the incision wound usually will have closed. At this point, leave the incision uncovered and open to the air. If the incision has not closed keep it covered.  · Cover your incision only if your clothing is rubbing it or causing irritation.  · Change your dressing if it gets wet or soiled.  Follow-up care  Follow up with your healthcare provider to ask how long sutures or staples should be left in place. Be sure to return for stitch or staple removal as directed. If dissolving stitches were used in your mouth, these will not need to be removed. They should fall out or dissolve on their own.  If tape closures were used, remove them yourself when your provider recommends if they have not fallen off on their own. If skin glue was used, the glue will wear off by  itself.  When to seek medical care  Call your healthcare provider if you have any of the following:  · More pain, redness, swelling, bleeding, or foul-smelling discharge around the incision area  · Fever of 100.4°F (38ºC) or higher, or as directed by your healthcare provider  · Shaking chills  · Vomiting or nausea that doesn't go away  · Numbness, coldness, or tingling around the incision area, or changes in skin color  · Opening of the sutures or wound  · Stitches or staples come apart or fall out or surgical tape falls off before 7 days or as directed by your healthcare provider

## 2019-01-04 NOTE — OP NOTE
DATE OF PROCEDURE:  01/04/2019    PROCEDURE PERFORMED:  Diagnostic laparoscopy.    PRIMARY SURGEON:  Edwar Fulton M.D.    ASSISTANT:  Lino Crouch M.D. (RES), transplant fellow.    ESTIMATED BLOOD LOSS:  5 mL.    ANESTHESIA:  General endotracheal.    PREOPERATIVE DIAGNOSIS:  Hepatocellular carcinoma.    POSTOPERATIVE DIAGNOSES:  Hepatocellular carcinoma and hepatic cirrhosis.    CLINIC NOTE:  Mr. Pompa is a 73-year-old male with a history of hepatitis C who   was recently diagnosed with liver mass in the right posterior segment of the   liver.  Biopsy revealed patchy fibrosis, but likely did not get adequate samples   or masses.  There was no diagnosis of malignancy.  Subsequent imaging had   diagnostic characteristics for hepatocellular carcinoma.  His platelet count was   normal and otherwise has well preserved liver function.  This case was   discussed at multidisciplinary conference with a recommendation to consider   candidacy for surgical resection versus local regional therapy.    PROCEDURE IN DETAIL:  The patient was brought to the Operating Room and   correctly identified.  General anesthesia was then induced with endotracheal   intubation without complication.  The abdomen was prepped and draped in a   sterile fashion.  A timeout procedure was performed.  We entered the abdomen   through a supraumbilical port placement using an open Iqra technique.  After   insufflation of the abdomen, a laparoscope was inserted.  The liver had gross   appearance of diffuse advanced cirrhosis with a nodular texture and firm   density.  Based on these gross findings in combination with the previous biopsy,   we felt that he was not a candidate for surgical resection due to the risk of   decompensation.  Photographs of the liver were taken for patient education.    Pneumoperitoneum was released and the ports removed.  Port sites were closed   with an 0 Vicryl suture.  Skin was closed with 4-0 Monocryl suture and    Vandana.  The patient was awoken from anesthesia and extubated in the   Operating Room and transferred to the PACU in stable condition.  I personally   reviewed the findings of the laparoscopy with the patient in the PACU with the   plan to refer back to Dr. Greene in Hepatology in [place] as well as Dr. Calero in Interventional Radiology.      STEPHEN/IN  dd: 01/04/2019 14:06:38 (CST)  td: 01/04/2019 17:14:10 (CST)  Doc ID   #[id number]  Job ID #[id number]    CC:

## 2019-01-05 NOTE — ANESTHESIA POSTPROCEDURE EVALUATION
"Anesthesia Post Evaluation    Patient: Shahab Pompa    Procedure(s) Performed: Procedure(s) (LRB):  LAPAROSCOPY Exploratory (N/A)    Final Anesthesia Type: general  Patient location during evaluation: PACU  Patient participation: Yes- Able to Participate  Level of consciousness: awake and alert  Post-procedure vital signs: reviewed and stable  Pain management: adequate  PONV status at discharge: No PONV  Anesthetic complications: no      Cardiovascular status: blood pressure returned to baseline  Respiratory status: unassisted  Hydration status: euvolemic  Follow-up not needed.        Visit Vitals  /78   Pulse 75   Temp 36.7 °C (98.1 °F) (Temporal)   Resp 18   Ht 5' 11" (1.803 m)   Wt 77.1 kg (170 lb)   SpO2 99%   BMI 23.71 kg/m²       Pain/Nuno Score: Pain Rating Prior to Med Admin: 3 (1/4/2019  2:50 PM)  Pain Rating Post Med Admin: 3 (1/4/2019  2:50 PM)  Nuno Score: 10 (1/4/2019  3:29 PM)  Modified Nuno Score: 20 (1/4/2019  3:29 PM)        "

## 2019-01-07 ENCOUNTER — TELEPHONE (OUTPATIENT)
Dept: GASTROENTEROLOGY | Facility: CLINIC | Age: 74
End: 2019-01-07

## 2019-01-07 NOTE — TELEPHONE ENCOUNTER
----- Message from Shannon Greene MD sent at 1/6/2019  2:09 PM CST -----  Yes, that sound good.  I will have my nurse schedule the patient to see me in clinic as soon as possible.    ----- Message -----  From: Geovanni Calero MD  Sent: 1/4/2019   2:50 PM  To: Santa Seth RN, Shannon Greene MD, #    Shannon-  I would recommend Y90.      Let me know if you want us to get that set up.  Geovanni      ----- Message -----  From: Edwar Fulton MD  Sent: 1/4/2019   2:30 PM  To: Geovanni Calero MD, Shannon Greene MD    Hi Shannon and Geovanni -    I took Mr Pompa to the OR today for possible resection. Unfortunately, his cirrhosis was pretty advanced and he is not a resection candidate.     He is from the [facility] and would like to get his care there. I think he should see Shannon for follow up and consideration of liver transplant (not sure he's a candidate). Geovanni, I discussed the case with Joe Sampson. He has a right posterior segment mass 3-4cm. Wed discussed possibly Y90 or maybe TACE ablate.    Can you help me get him plugged in with you all in [place]?    Thanks much.    Edwar

## 2019-01-08 LAB
BLD PROD TYP BPU: NORMAL
BLOOD UNIT EXPIRATION DATE: NORMAL
BLOOD UNIT TYPE CODE: 5100
BLOOD UNIT TYPE: NORMAL
CODING SYSTEM: NORMAL
DISPENSE STATUS: NORMAL
TRANS ERYTHROCYTES VOL PATIENT: NORMAL ML

## 2019-01-11 ENCOUNTER — OFFICE VISIT (OUTPATIENT)
Dept: GASTROENTEROLOGY | Facility: CLINIC | Age: 74
End: 2019-01-11
Payer: MEDICARE

## 2019-01-11 VITALS
HEART RATE: 70 BPM | SYSTOLIC BLOOD PRESSURE: 114 MMHG | DIASTOLIC BLOOD PRESSURE: 60 MMHG | BODY MASS INDEX: 23.77 KG/M2 | HEIGHT: 71 IN | WEIGHT: 169.75 LBS

## 2019-01-11 DIAGNOSIS — B19.20 COMPENSATED HCV CIRRHOSIS: ICD-10-CM

## 2019-01-11 DIAGNOSIS — C22.0 HCC (HEPATOCELLULAR CARCINOMA): Primary | ICD-10-CM

## 2019-01-11 DIAGNOSIS — K74.69 COMPENSATED HCV CIRRHOSIS: ICD-10-CM

## 2019-01-11 PROCEDURE — 99213 OFFICE O/P EST LOW 20 MIN: CPT | Mod: S$GLB,,, | Performed by: INTERNAL MEDICINE

## 2019-01-11 PROCEDURE — 99213 PR OFFICE/OUTPT VISIT, EST, LEVL III, 20-29 MIN: ICD-10-PCS | Mod: S$GLB,,, | Performed by: INTERNAL MEDICINE

## 2019-01-11 PROCEDURE — 99999 PR PBB SHADOW E&M-EST. PATIENT-LVL III: CPT | Mod: PBBFAC,,, | Performed by: INTERNAL MEDICINE

## 2019-01-11 PROCEDURE — 3074F SYST BP LT 130 MM HG: CPT | Mod: S$GLB,,, | Performed by: INTERNAL MEDICINE

## 2019-01-11 PROCEDURE — 99999 PR PBB SHADOW E&M-EST. PATIENT-LVL III: ICD-10-PCS | Mod: PBBFAC,,, | Performed by: INTERNAL MEDICINE

## 2019-01-11 PROCEDURE — 1101F PR PT FALLS ASSESS DOC 0-1 FALLS W/OUT INJ PAST YR: ICD-10-PCS | Mod: S$GLB,,, | Performed by: INTERNAL MEDICINE

## 2019-01-11 PROCEDURE — 1101F PT FALLS ASSESS-DOCD LE1/YR: CPT | Mod: S$GLB,,, | Performed by: INTERNAL MEDICINE

## 2019-01-11 PROCEDURE — 3074F PR MOST RECENT SYSTOLIC BLOOD PRESSURE < 130 MM HG: ICD-10-PCS | Mod: S$GLB,,, | Performed by: INTERNAL MEDICINE

## 2019-01-11 PROCEDURE — 3078F DIAST BP <80 MM HG: CPT | Mod: S$GLB,,, | Performed by: INTERNAL MEDICINE

## 2019-01-11 PROCEDURE — 3078F PR MOST RECENT DIASTOLIC BLOOD PRESSURE < 80 MM HG: ICD-10-PCS | Mod: S$GLB,,, | Performed by: INTERNAL MEDICINE

## 2019-01-11 RX ORDER — MOMETASONE FUROATE 1 MG/G
CREAM TOPICAL
Status: ON HOLD | COMMUNITY
Start: 2018-12-21 | End: 2019-01-31 | Stop reason: CLARIF

## 2019-01-11 NOTE — H&P (VIEW-ONLY)
Subjective:     Shahab Pompa is here for follow up of cirrhosis    HPI  Since Shahab Pompa's last visit there have been no significant issues.  Overall feels well.    No evidence of liver decompensation: no ascites, confusion or GI bleeding.     IR Conference:    Plan: MRI shows a lobulated enhancing mass (4.0 cm) which washes out. There is associated pseudocapsule. This lesion is concerning for malignancy.      Concerning for HCC enhancement pseudo capsule and washout.  Right posterior lobe resection.  Surgery Consult with Dr Fulton    Saw surgery but deemed to be high risk so here to discuss other options.        Review of Systems   Constitutional: Negative.    HENT: Negative.    Eyes: Negative.    Respiratory: Negative.    Cardiovascular: Negative.    Gastrointestinal: Negative.    Genitourinary: Negative.    Musculoskeletal: Negative.    Skin: Negative.    Neurological: Negative.    Psychiatric/Behavioral: Negative.        Objective:     Physical Exam   Constitutional: He is oriented to person, place, and time. He appears well-developed and well-nourished. No distress.   HENT:   Head: Normocephalic and atraumatic.   Mouth/Throat: Oropharynx is clear and moist. No oropharyngeal exudate.   Eyes: Conjunctivae are normal. Pupils are equal, round, and reactive to light. Right eye exhibits no discharge. Left eye exhibits no discharge. No scleral icterus.   Pulmonary/Chest: Effort normal and breath sounds normal. No respiratory distress. He has no wheezes.   Abdominal: Soft. He exhibits no distension. There is no tenderness.   Musculoskeletal: He exhibits no edema.   Neurological: He is alert and oriented to person, place, and time.   Psychiatric: He has a normal mood and affect. His behavior is normal.   Vitals reviewed.      MELD-Na score: 11 at 12/17/2018 10:45 AM  MELD score: 11 at 12/17/2018 10:45 AM  Calculated from:  Serum Creatinine: 1.6 mg/dL at 12/17/2018 10:45 AM  Serum Sodium: 140 mmol/L (Rounded to  137 mmol/L) at 12/17/2018 10:45 AM  Total Bilirubin: 0.4 mg/dL (Rounded to 1 mg/dL) at 12/17/2018 10:45 AM  INR(ratio): 0.9 (Rounded to 1) at 12/17/2018 10:45 AM  Age: 73 years    WBC   Date Value Ref Range Status   12/17/2018 8.50 3.90 - 12.70 K/uL Final     Hemoglobin   Date Value Ref Range Status   12/17/2018 14.2 14.0 - 18.0 g/dL Final     Hematocrit   Date Value Ref Range Status   12/17/2018 42.9 40.0 - 54.0 % Final     Platelets   Date Value Ref Range Status   12/17/2018 275 150 - 350 K/uL Final     BUN, Bld   Date Value Ref Range Status   12/17/2018 16 8 - 23 mg/dL Final     Creatinine   Date Value Ref Range Status   12/17/2018 1.6 (H) 0.5 - 1.4 mg/dL Final     Glucose   Date Value Ref Range Status   12/17/2018 101 70 - 110 mg/dL Final     Calcium   Date Value Ref Range Status   12/17/2018 9.9 8.7 - 10.5 mg/dL Final     Sodium   Date Value Ref Range Status   12/17/2018 140 136 - 145 mmol/L Final     Potassium   Date Value Ref Range Status   12/17/2018 5.1 3.5 - 5.1 mmol/L Final     Chloride   Date Value Ref Range Status   12/17/2018 107 95 - 110 mmol/L Final     AST   Date Value Ref Range Status   12/17/2018 35 10 - 40 U/L Final     ALT   Date Value Ref Range Status   12/17/2018 18 10 - 44 U/L Final     Alkaline Phosphatase   Date Value Ref Range Status   12/17/2018 86 55 - 135 U/L Final     Total Bilirubin   Date Value Ref Range Status   12/17/2018 0.4 0.1 - 1.0 mg/dL Final     Comment:     For infants and newborns, interpretation of results should be based  on gestational age, weight and in agreement with clinical  observations.  Premature Infant recommended reference ranges:  Up to 24 hours.............<8.0 mg/dL  Up to 48 hours............<12.0 mg/dL  3-5 days..................<15.0 mg/dL  6-29 days.................<15.0 mg/dL       Albumin   Date Value Ref Range Status   12/17/2018 3.6 3.5 - 5.2 g/dL Final     INR   Date Value Ref Range Status   12/17/2018 0.9 0.8 - 1.2 Final     Comment:     Coumadin  Therapy:  2.0 - 3.0 for INR for all indicators except mechanical heart valves  and antiphospholipid syndromes which should use 2.5 - 3.5.           Assessment/Plan:     1. HCC (hepatocellular carcinoma)    2. Compensated HCV cirrhosis      Shahab Pompa is a 73 y.o. male withCirrhosis and Hepatic Cancer      HCC (hepatocellular carcinoma)-given age and comorbidities he is not a transplant candidate. Recommend locoregional therapy  -Refer to IR for treatment  -     AFP tumor marker; Standing  -     Protime-INR; Standing  -     APTT; Future; Expected date: 01/11/2019    Compensated HCV cirrhosis-low meld, well compensated  -     Comprehensive metabolic panel; Standing  -     CBC auto differential; Standing  -     Protime-INR; Standing  -     APTT; Future; Expected date: 01/11/2019      RTC in 3 months with preclinic labs and imaging    Shannon Greene MD

## 2019-01-15 ENCOUNTER — TELEPHONE (OUTPATIENT)
Dept: GASTROENTEROLOGY | Facility: CLINIC | Age: 74
End: 2019-01-15

## 2019-01-15 NOTE — TELEPHONE ENCOUNTER
----- Message from Sandee Vazquez sent at 1/15/2019 12:40 PM CST -----  Contact: pt   States he's calling rg his appts that were to be set up / states he's calling to discuss the appts and can be reached at [phone]/thanks/dbw     Requests to speak with Angie

## 2019-01-15 NOTE — TELEPHONE ENCOUNTER
Returned pt's call - he states he is concerned about his upcoming testing.  He states it was his understanding that he would have a lab test, followed by a test radiation process and then the actual radiation treatment.  He states he knows that he is scheduled for labs on 02/01/2019 and then again 03/29/2019.  Wants to know when is he going to be scheduled for his other testing as he is worried that his cancer may spread before anything is done for him.

## 2019-01-17 ENCOUNTER — TELEPHONE (OUTPATIENT)
Dept: GASTROENTEROLOGY | Facility: CLINIC | Age: 74
End: 2019-01-17

## 2019-01-17 NOTE — TELEPHONE ENCOUNTER
Pt. Called regarding upcoming test, advised that I will send a message to Dr. Calero office and have them contact him. Pt. Is very anxious please contact pt. In regards to appointments.

## 2019-01-17 NOTE — TELEPHONE ENCOUNTER
----- Message from Caryl Abarca sent at 1/17/2019  1:41 PM CST -----  Pt is requesting a call from nurse to to f/u on a previous call from the provider.            Please call pt back at [phone]

## 2019-01-18 ENCOUNTER — TELEPHONE (OUTPATIENT)
Dept: RADIOLOGY | Facility: HOSPITAL | Age: 74
End: 2019-01-18

## 2019-01-18 DIAGNOSIS — K76.9 LIVER LESION: ICD-10-CM

## 2019-01-24 ENCOUNTER — HOSPITAL ENCOUNTER (OUTPATIENT)
Dept: RADIOLOGY | Facility: HOSPITAL | Age: 74
Discharge: HOME | End: 2019-01-24
Attending: PHYSICIAN ASSISTANT | Admitting: RADIOLOGY
Payer: MEDICARE

## 2019-01-24 ENCOUNTER — HOSPITAL ENCOUNTER (OUTPATIENT)
Facility: HOSPITAL | Age: 74
Discharge: HOME | End: 2019-01-24
Attending: RADIOLOGY | Admitting: RADIOLOGY
Payer: MEDICARE

## 2019-01-24 VITALS
SYSTOLIC BLOOD PRESSURE: 146 MMHG | DIASTOLIC BLOOD PRESSURE: 70 MMHG | WEIGHT: 169 LBS | TEMPERATURE: 98 F | RESPIRATION RATE: 10 BRPM | OXYGEN SATURATION: 99 % | BODY MASS INDEX: 23.66 KG/M2 | HEART RATE: 66 BPM | HEIGHT: 71 IN

## 2019-01-24 DIAGNOSIS — K76.9 LIVER LESION: ICD-10-CM

## 2019-01-24 DIAGNOSIS — C22.0 HCC (HEPATOCELLULAR CARCINOMA): ICD-10-CM

## 2019-01-24 LAB
AFP SERPL-MCNC: 19 NG/ML
ALBUMIN SERPL BCP-MCNC: 3.7 G/DL
ALP SERPL-CCNC: 85 U/L
ALT SERPL W/O P-5'-P-CCNC: 20 U/L
ANION GAP SERPL CALC-SCNC: 8 MMOL/L
APTT BLDCRRT: 29.6 SEC
AST SERPL-CCNC: 34 U/L
BASOPHILS # BLD AUTO: 0.02 K/UL
BASOPHILS NFR BLD: 0.2 %
BILIRUB SERPL-MCNC: 0.7 MG/DL
BUN SERPL-MCNC: 16 MG/DL
CALCIUM SERPL-MCNC: 10.6 MG/DL
CHLORIDE SERPL-SCNC: 106 MMOL/L
CO2 SERPL-SCNC: 24 MMOL/L
CREAT SERPL-MCNC: 1.7 MG/DL
DIFFERENTIAL METHOD: ABNORMAL
EOSINOPHIL # BLD AUTO: 0.7 K/UL
EOSINOPHIL NFR BLD: 8.4 %
ERYTHROCYTE [DISTWIDTH] IN BLOOD BY AUTOMATED COUNT: 14.2 %
EST. GFR  (AFRICAN AMERICAN): 46 ML/MIN/1.73 M^2
EST. GFR  (NON AFRICAN AMERICAN): 39 ML/MIN/1.73 M^2
GLUCOSE SERPL-MCNC: 92 MG/DL
HCT VFR BLD AUTO: 42.1 %
HGB BLD-MCNC: 13.9 G/DL
INR PPP: 0.9
LYMPHOCYTES # BLD AUTO: 1.7 K/UL
LYMPHOCYTES NFR BLD: 20.4 %
MCH RBC QN AUTO: 29.9 PG
MCHC RBC AUTO-ENTMCNC: 33 G/DL
MCV RBC AUTO: 91 FL
MONOCYTES # BLD AUTO: 0.8 K/UL
MONOCYTES NFR BLD: 10.3 %
NEUTROPHILS # BLD AUTO: 4.9 K/UL
NEUTROPHILS NFR BLD: 60.7 %
PLATELET # BLD AUTO: 218 K/UL
PMV BLD AUTO: 9.3 FL
POTASSIUM SERPL-SCNC: 4 MMOL/L
PROT SERPL-MCNC: 8.4 G/DL
PROTHROMBIN TIME: 10.2 SEC
RBC # BLD AUTO: 4.65 M/UL
SODIUM SERPL-SCNC: 138 MMOL/L
WBC # BLD AUTO: 8.09 K/UL

## 2019-01-24 PROCEDURE — A9540 TC99M MAA: HCPCS

## 2019-01-24 PROCEDURE — 85610 PROTHROMBIN TIME: CPT

## 2019-01-24 PROCEDURE — 78201 LIVER IMAGING STATIC ONLY: CPT | Mod: TC

## 2019-01-24 PROCEDURE — 80053 COMPREHEN METABOLIC PANEL: CPT

## 2019-01-24 PROCEDURE — 63600175 PHARM REV CODE 636 W HCPCS

## 2019-01-24 PROCEDURE — 25000003 PHARM REV CODE 250

## 2019-01-24 PROCEDURE — 85730 THROMBOPLASTIN TIME PARTIAL: CPT

## 2019-01-24 PROCEDURE — 25500020 PHARM REV CODE 255

## 2019-01-24 PROCEDURE — G0269 OCCLUSIVE DEVICE IN VEIN ART: HCPCS

## 2019-01-24 PROCEDURE — 82105 ALPHA-FETOPROTEIN SERUM: CPT

## 2019-01-24 PROCEDURE — 85025 COMPLETE CBC W/AUTO DIFF WBC: CPT

## 2019-01-24 RX ORDER — FAMOTIDINE 20 MG/50ML
20 INJECTION, SOLUTION INTRAVENOUS ONCE
Status: CANCELLED | OUTPATIENT
Start: 2019-01-24 | End: 2019-01-24

## 2019-01-24 RX ORDER — SODIUM CHLORIDE 9 MG/ML
INJECTION, SOLUTION INTRAVENOUS CONTINUOUS
Status: DISCONTINUED | OUTPATIENT
Start: 2019-01-24 | End: 2019-01-24 | Stop reason: HOSPADM

## 2019-01-24 RX ORDER — METRONIDAZOLE 500 MG/100ML
500 INJECTION, SOLUTION INTRAVENOUS ONCE
Status: CANCELLED | OUTPATIENT
Start: 2019-01-24 | End: 2019-01-24

## 2019-01-24 RX ORDER — CEFAZOLIN SODIUM 1 G/50ML
1 SOLUTION INTRAVENOUS ONCE
Status: CANCELLED | OUTPATIENT
Start: 2019-01-24 | End: 2019-01-24

## 2019-01-24 RX ORDER — ONDANSETRON 4 MG/1
4 TABLET, FILM COATED ORAL EVERY 6 HOURS PRN
Status: DISCONTINUED | OUTPATIENT
Start: 2019-01-24 | End: 2019-01-24 | Stop reason: HOSPADM

## 2019-01-24 RX ORDER — DIPHENHYDRAMINE HYDROCHLORIDE 50 MG/ML
25 INJECTION INTRAMUSCULAR; INTRAVENOUS ONCE
Status: CANCELLED | OUTPATIENT
Start: 2019-01-24 | End: 2019-01-24

## 2019-01-24 RX ADMIN — SODIUM CHLORIDE: 9 INJECTION, SOLUTION INTRAVENOUS at 10:01

## 2019-01-24 NOTE — DISCHARGE INSTRUCTIONS
"Post-op     1. DIET: It is advisable for you to follow a diet that limits the intake of salt, sugar, saturated fats and cholesterol.     2. FOR THE NEXT 24 HOURS:   · For the next 8 hours, you should be watched by a responsible adult. This person should make sure your condition is not getting worse.   · Don't drink any alcohol for the next 24 hours.  · Don't drive, operate dangerous machinery, or make important business or personal decisions during the next 24 hours.  · Your healthcare provider may tell you not to take any medicine by mouth for pain or sleep in the next 4 hours. These medicines may react with the medicines you were given in the hospital. This could cause a much stronger response than usual.       3. ACTIVITY:                                Day of discharge:             NO vigorous activity, lifting or straining                                                   Day after discharge:         Avoid heavy lifting (up to 10 lbs)                                                                        The day after discharge you may shower, but avoid tub baths for 5 days                                                                         Wash site gently with soap and water        NO powder or lotion to your procedure site.                 Before you shower, remove dressing, apply  bandaid if desired                                                                                                                                                                            2nd day after discharge:  Resume normal activities                                                                         Exercise program as instructed      4. WOUND CARE: It is not unusual to have a small amount of bruising appear in the groin area. It is also common to have a tender "knot" develop beneath the skin at the puncture site in the groin. This is scar tissue only and is not a cause for concern or alarm. This tender knot may " "take several weeks to fully resolve. The bruise will usually spread over several days. If the lump gets bigger, call you doctor immediately.    5. DISCOMFORT: For general discomfort at the puncture site, you may take 1 or 2 Acetaminophen (Tylenol) tablets every 4 hours as needed. (Do not take more than 4000 mg a day)                 6. CALL YOUR HEALTHCARE PROVIDER IF YOU START TO HAVE THE FOLLOWING SYMPTOMS:        1. Problems with the affected leg: Pain, discomfort, loss of warmth, numbness                     or tingling                                                                                                                            2. Problems at the groin site: Bleeding, pain that is sudden/sharp/persistent,                   swelling at site or a change in "lump" size, increased redness or drainage at                     puncture site                                                               3. High fever (101 degrees or higher)       4.  Drowsiness that doesn't get better       5. Weakness or dizziness that doesn't get better            6. Repeated vomiting        7. GO TO  THE EMERGENCY ROOM OR CALL 911 IF YOU HAVE: Chest pains or discomforts not relieved with 3 nitroglycerin doses (sublingual tablets or spray), numbness or severe pain or if your foot or leg becomes cold or discolored or uncontrolled bleeding from site (apply direct pressure above site).  "

## 2019-01-24 NOTE — PROGRESS NOTES
Discharge orders received and reviewed with patient and family. Bed rest completed as ordered, groin site CDI with dressing in place and no hematoma. IV removed, monitors removed, pt wheeled to front lobby for discharge home with family.

## 2019-01-24 NOTE — INTERVAL H&P NOTE
The patient has been examined and the H&P has been reviewed:    I concur with the findings and no changes have occurred since H&P was written.    Anesthesia/Surgery risks, benefits and alternative options discussed and understood by patient/family.          Active Hospital Problems    Diagnosis  POA    HCC (hepatocellular carcinoma) [C22.0]  Yes     Priority: Low      Resolved Hospital Problems   No resolved problems to display.

## 2019-01-25 ENCOUNTER — TELEPHONE (OUTPATIENT)
Dept: RADIOLOGY | Facility: HOSPITAL | Age: 74
End: 2019-01-25

## 2019-01-25 DIAGNOSIS — K76.9 LIVER LESION: ICD-10-CM

## 2019-01-25 NOTE — DISCHARGE SUMMARY
Radiology Discharge Summary      Hospital Course: No complications    Admit Date: 1/24/2019  Discharge Date: 1/24/19    Instructions Given to Patient: Yes  Diet: regular diet and Resume prior diet  Activity: activity as tolerated and no driving for today    Description of Condition on Discharge: Stable  Vital Signs (Most Recent): Temp: 98.1 °F (36.7 °C) (01/24/19 1245)  Pulse: 66 (01/24/19 1430)  Resp: 10 (01/24/19 1430)  BP: (!) 146/70 (01/24/19 1430)  SpO2: 99 % (01/24/19 1430)    Discharge Disposition: Home    Discharge Diagnosis: HCC     Follow-up: y90 in 1 week    Geovanni Calero MD  Staff Radiologist  Department of Radiology  Pager: [phone]

## 2019-01-25 NOTE — PROCEDURES
Radiology Post-Procedure Note    Pre Op Diagnosis: HCC  Post Op Diagnosis: Same    Procedure: y90 mapping    Procedure performed by: Dr Geovanni Calero    Written Informed Consent Obtained: Yes  Specimen Removed: NO  Estimated Blood Loss: Minimal    Findings:   No complications of y90 mapping study.   See dictation.     Patient tolerated procedure well.    Geovanni Calero MD  Staff Radiologist  Department of Radiology  Pager: [phone]

## 2019-01-31 ENCOUNTER — HOSPITAL ENCOUNTER (OUTPATIENT)
Facility: HOSPITAL | Age: 74
Discharge: HOME | End: 2019-01-31
Attending: RADIOLOGY | Admitting: RADIOLOGY
Payer: MEDICARE

## 2019-01-31 ENCOUNTER — HOSPITAL ENCOUNTER (OUTPATIENT)
Dept: RADIOLOGY | Facility: HOSPITAL | Age: 74
Discharge: HOME | End: 2019-01-31
Attending: PHYSICIAN ASSISTANT | Admitting: RADIOLOGY
Payer: MEDICARE

## 2019-01-31 VITALS
WEIGHT: 169 LBS | HEART RATE: 73 BPM | DIASTOLIC BLOOD PRESSURE: 71 MMHG | SYSTOLIC BLOOD PRESSURE: 132 MMHG | HEIGHT: 71 IN | RESPIRATION RATE: 15 BRPM | OXYGEN SATURATION: 100 % | BODY MASS INDEX: 23.66 KG/M2 | TEMPERATURE: 98 F

## 2019-01-31 DIAGNOSIS — C22.0 HCC (HEPATOCELLULAR CARCINOMA): ICD-10-CM

## 2019-01-31 DIAGNOSIS — K76.9 LIVER LESION: ICD-10-CM

## 2019-01-31 LAB
AFP SERPL-MCNC: 22 NG/ML
ALBUMIN SERPL BCP-MCNC: 3.6 G/DL
ALP SERPL-CCNC: 70 U/L
ALT SERPL W/O P-5'-P-CCNC: 17 U/L
ANION GAP SERPL CALC-SCNC: 7 MMOL/L
APTT BLDCRRT: 27.3 SEC
AST SERPL-CCNC: 32 U/L
BASOPHILS # BLD AUTO: 0.03 K/UL
BASOPHILS NFR BLD: 0.4 %
BILIRUB SERPL-MCNC: 0.9 MG/DL
BUN SERPL-MCNC: 17 MG/DL
CALCIUM SERPL-MCNC: 10 MG/DL
CHLORIDE SERPL-SCNC: 103 MMOL/L
CO2 SERPL-SCNC: 26 MMOL/L
CREAT SERPL-MCNC: 1.5 MG/DL
DIFFERENTIAL METHOD: ABNORMAL
EOSINOPHIL # BLD AUTO: 0.6 K/UL
EOSINOPHIL NFR BLD: 8.8 %
ERYTHROCYTE [DISTWIDTH] IN BLOOD BY AUTOMATED COUNT: 14.2 %
EST. GFR  (AFRICAN AMERICAN): 53 ML/MIN/1.73 M^2
EST. GFR  (NON AFRICAN AMERICAN): 46 ML/MIN/1.73 M^2
GLUCOSE SERPL-MCNC: 72 MG/DL
HCT VFR BLD AUTO: 41.8 %
HGB BLD-MCNC: 13.8 G/DL
INR PPP: 0.9
LYMPHOCYTES # BLD AUTO: 2 K/UL
LYMPHOCYTES NFR BLD: 28.2 %
MCH RBC QN AUTO: 29.9 PG
MCHC RBC AUTO-ENTMCNC: 33 G/DL
MCV RBC AUTO: 91 FL
MONOCYTES # BLD AUTO: 0.8 K/UL
MONOCYTES NFR BLD: 11.2 %
NEUTROPHILS # BLD AUTO: 3.6 K/UL
NEUTROPHILS NFR BLD: 51.4 %
PLATELET # BLD AUTO: 216 K/UL
PMV BLD AUTO: 9.4 FL
POTASSIUM SERPL-SCNC: 4.9 MMOL/L
PROT SERPL-MCNC: 8.6 G/DL
PROTHROMBIN TIME: 10.3 SEC
RBC # BLD AUTO: 4.62 M/UL
SODIUM SERPL-SCNC: 136 MMOL/L
WBC # BLD AUTO: 7.06 K/UL

## 2019-01-31 PROCEDURE — 85610 PROTHROMBIN TIME: CPT

## 2019-01-31 PROCEDURE — 25000003 PHARM REV CODE 250: Performed by: PHYSICIAN ASSISTANT

## 2019-01-31 PROCEDURE — 80053 COMPREHEN METABOLIC PANEL: CPT

## 2019-01-31 PROCEDURE — S0028 INJECTION, FAMOTIDINE, 20 MG: HCPCS | Performed by: PHYSICIAN ASSISTANT

## 2019-01-31 PROCEDURE — 63600175 PHARM REV CODE 636 W HCPCS

## 2019-01-31 PROCEDURE — 85025 COMPLETE CBC W/AUTO DIFF WBC: CPT

## 2019-01-31 PROCEDURE — 99152 MOD SED SAME PHYS/QHP 5/>YRS: CPT

## 2019-01-31 PROCEDURE — S0030 INJECTION, METRONIDAZOLE: HCPCS | Performed by: PHYSICIAN ASSISTANT

## 2019-01-31 PROCEDURE — 78201 LIVER IMAGING STATIC ONLY: CPT | Mod: TC

## 2019-01-31 PROCEDURE — 82105 ALPHA-FETOPROTEIN SERUM: CPT

## 2019-01-31 PROCEDURE — 25000003 PHARM REV CODE 250

## 2019-01-31 PROCEDURE — 25500020 PHARM REV CODE 255: Mod: 59

## 2019-01-31 PROCEDURE — 63600175 PHARM REV CODE 636 W HCPCS: Performed by: PHYSICIAN ASSISTANT

## 2019-01-31 PROCEDURE — G0269 OCCLUSIVE DEVICE IN VEIN ART: HCPCS

## 2019-01-31 PROCEDURE — 85730 THROMBOPLASTIN TIME PARTIAL: CPT

## 2019-01-31 RX ORDER — SODIUM CHLORIDE 9 MG/ML
INJECTION, SOLUTION INTRAVENOUS CONTINUOUS
Status: DISCONTINUED | OUTPATIENT
Start: 2019-01-31 | End: 2019-01-31 | Stop reason: HOSPADM

## 2019-01-31 RX ORDER — DIPHENHYDRAMINE HYDROCHLORIDE 50 MG/ML
25 INJECTION INTRAMUSCULAR; INTRAVENOUS ONCE
Status: COMPLETED | OUTPATIENT
Start: 2019-01-31 | End: 2019-01-31

## 2019-01-31 RX ORDER — FAMOTIDINE 20 MG/50ML
20 INJECTION, SOLUTION INTRAVENOUS ONCE
Status: COMPLETED | OUTPATIENT
Start: 2019-01-31 | End: 2019-01-31

## 2019-01-31 RX ORDER — ONDANSETRON 4 MG/1
4 TABLET, ORALLY DISINTEGRATING ORAL ONCE
Qty: 15 TABLET | Refills: 1 | Status: SHIPPED | OUTPATIENT
Start: 2019-01-31 | End: 2019-01-31

## 2019-01-31 RX ORDER — METRONIDAZOLE 500 MG/100ML
500 INJECTION, SOLUTION INTRAVENOUS ONCE
Status: COMPLETED | OUTPATIENT
Start: 2019-01-31 | End: 2019-01-31

## 2019-01-31 RX ORDER — CEFAZOLIN SODIUM 1 G/50ML
1 SOLUTION INTRAVENOUS ONCE
Status: COMPLETED | OUTPATIENT
Start: 2019-01-31 | End: 2019-01-31

## 2019-01-31 RX ORDER — ONDANSETRON 2 MG/ML
4 INJECTION INTRAMUSCULAR; INTRAVENOUS EVERY 8 HOURS PRN
Status: DISCONTINUED | OUTPATIENT
Start: 2019-01-31 | End: 2019-01-31 | Stop reason: HOSPADM

## 2019-01-31 RX ADMIN — DIPHENHYDRAMINE HYDROCHLORIDE 25 MG: 50 INJECTION INTRAMUSCULAR; INTRAVENOUS at 10:01

## 2019-01-31 RX ADMIN — CEFAZOLIN SODIUM 1 G: 1 SOLUTION INTRAVENOUS at 11:01

## 2019-01-31 RX ADMIN — FAMOTIDINE 20 MG: 20 INJECTION, SOLUTION INTRAVENOUS at 10:01

## 2019-01-31 RX ADMIN — HYDROCORTISONE SODIUM SUCCINATE 200 MG: 100 INJECTION, POWDER, FOR SOLUTION INTRAMUSCULAR; INTRAVENOUS at 10:01

## 2019-01-31 RX ADMIN — ONDANSETRON HYDROCHLORIDE 16 MG: 2 INJECTION, SOLUTION INTRAMUSCULAR; INTRAVENOUS at 11:01

## 2019-01-31 RX ADMIN — METRONIDAZOLE 500 MG: 500 SOLUTION INTRAVENOUS at 11:01

## 2019-01-31 RX ADMIN — SODIUM CHLORIDE: 9 INJECTION, SOLUTION INTRAVENOUS at 10:01

## 2019-01-31 NOTE — PLAN OF CARE
1410 BEDREST COMPLETE.  DANGLED ON SIDE OF BED, THEN AMBULATED TO BATHROOM AND BACK.  R GROIN INTACT.  1415 DISCHARGE INST. REVIEWED AND COPY GIVEN.  1420 IV REMOVED.  WAITING FOR DR. PALOMARES TO COME SPEAK TO HIM. 1440DISCHARGED HOME.  TO EXIT VIA W/C

## 2019-01-31 NOTE — H&P
[facility] -   History & Physical    Subjective:      Chief Complaint/Reason for Admission: Liver lesion    Shahab Pompa is a 73 y.o. male.    Past Medical History:   Diagnosis Date    Cancer     prostate, bladder, and ear    Fibrosis lung     GERD (gastroesophageal reflux disease)     Hepatitis C     tx'd in 2016 w/ Harvoni - achieved SVR    Neuromuscular disorder      Past Surgical History:   Procedure Laterality Date    BLADDER SURGERY      cancer removal    CAROTID ENDARTERECTOMY Right     INGUINAL HERNIA REPAIR Bilateral     KNEE SURGERY      LAPAROSCOPY Exploratory N/A 1/4/2019    Performed by Edwar Fulton MD at [facility] OR [address] [place] [address]    PROSTATE SURGERY      cancer removal     Family History   Problem Relation Age of Onset    Cancer Mother     Cancer Father         bladder and prostate    Colon cancer Neg Hx     Stomach cancer Neg Hx      Social History     Tobacco Use    Smoking status: Current Every Day Smoker     Packs/day: 0.50     Years: 30.00     Pack years: 15.00    Smokeless tobacco: Never Used   Substance Use Topics    Alcohol use: No    Drug use: No       PTA Medications   Medication Sig    amLODIPine (NORVASC) 5 MG tablet Take 5 mg by mouth nightly as needed.    aspirin-sod bicarb-citric acid (SHON-SELTZER) 324 mg TbEF Take 325 mg by mouth every 6 (six) hours as needed.    COMBIVENT RESPIMAT  mcg/actuation inhaler every evening.     cyclobenzaprine HCl (FLEXERIL ORAL) Take by mouth every evening.    desonide (DESOWEN) 0.05 % lotion Apply topically. Once in 2 days for rash    eszopiclone (LUNESTA) 3 mg Tab Take 3 mg by mouth every evening.    lactulose (CHRONULAC) 10 gram/15 mL solution Take by mouth as needed.    morphine (MSIR) 15 MG tablet Take 15 mg by mouth every 8 (eight) hours as needed for Pain.     mometasone 0.1% (ELOCON) 0.1 % cream Apply topically as needed (rash).    oxyCODONE (OXY-IR) 5 mg Cap Take 1 capsule (5 mg total) by mouth every 6  (six) hours as needed for Pain.     Review of patient's allergies indicates:  No Known Allergies     Review of Systems   Constitutional: Negative.    Cardiovascular: Negative.    Gastrointestinal: Negative.    Skin: Negative.        Objective:      Vital Signs (Most Recent)       Vital Signs Range (Last 24H):       Physical Exam   Constitutional: He appears well-developed and well-nourished.   Eyes: Pupils are equal, round, and reactive to light.   Pulmonary/Chest: Effort normal.   Abdominal: Soft.       Data Review:    CBC:   Lab Results   Component Value Date    WBC 8.09 01/24/2019    RBC 4.65 01/24/2019    HGB 13.9 (L) 01/24/2019    HCT 42.1 01/24/2019     01/24/2019      ECG: no prior ECG.     Assessment:      There are no hospital problems to display for this patient.      Plan:    IR Embolization

## 2019-01-31 NOTE — NURSING
Dr Calero at bedside talking with pt and spouse. Ready for procedure early. Zofran, Ancef and Flagyl to be given by cath lab nurses.

## 2019-01-31 NOTE — PROCEDURES
Radiology Post-Procedure Note    Pre Op Diagnosis: Hepatocellular carcinoma    Post Op Diagnosis: Same    Procedure: Yttrium    Procedure performed by: Geovanni Calero MD    Written Informed Consent Obtained: Yes    Specimen Removed: No    Estimated Blood Loss: Minimal    Findings: no complications.  See dictation  Patient tolerated procedure well.    Geovanni Calero MD  Staff Radiologist  Department of Radiology  Pager: [phone]

## 2019-01-31 NOTE — DISCHARGE INSTRUCTIONS
DISCHARGE INSTRUCTIONS POST Y-90    1) Patients should not sleep in the same bed with their partners for one week. Adult family members may stay in the same room with the patient, keeping a six foot distance for one week.    2) No close contact with children, pregnant women, or animals for one week.    3) No public transportation next to a fellow passenger for more than two hours, for one week.    4) Uneaten food may be disposed of in the usual fashion.    5) Clothing and linens may be changed and laundered in the usual fashion.    6) Drink plenty of fluids. Dehydration can result in readmission.    7) Should you require urgent care or readmission, notify your health care providers of your procedure and date of treatment, no hesitation should be given in providing the care you need.    8)  In the event of death from any cause after the procedure, there may be restrictions on cremation or embalming. Please have a family member or friend contact the office. Your interventional radiologist should be informed immediately so the appropriate precautions can be taken.      WHAT TO WATCH FOR    1) It is normal to experience fatigue, flu like symptoms, fever (less than 101.5 degrees Fahrenheit or 38.5 degrees Celsius), nausea, and pain on your right side, back shoulder which is controllable with prescribed pain medication.    2) Call the interventional radiologist for  * Fever greater than 101.5/38.5  * Shortness of breath  * Pain in the chest, back or abdomen that does not respond to prescribed pain medications  *Visible changes in the skin over the abdomen or backPost-op Heart Catheterization    1. DIET: It is advisable for you to follow a diet that limits the intake of salt, sugar, saturated fats and cholesterol.     2. FOR THE NEXT 24 HOURS:   · For the next 8 hours, you should be watched by a responsible adult. This person should make sure your condition is not getting worse.   · Don't drink any alcohol for the next 24  "hours.  · Don't drive, operate dangerous machinery, or make important business or personal decisions during the next 24 hours.  · Your healthcare provider may tell you not to take any medicine by mouth for pain or sleep in the next 4 hours. These medicines may react with the medicines you were given in the hospital. This could cause a much stronger response than usual.       3. ACTIVITY:                                Day of discharge:             NO vigorous activity, lifting or straining                                                   Day after discharge:         Avoid heavy lifting (up to 10 lbs)                                                                        The day after discharge you may shower,                but avoid tub baths for 5 days                                                                         Wash site gently with soap and water        NO powder or lotion to your procedure site.                 Before you shower, remove dressing, apply       bandaid if desired                                                                                                                                                                            2nd day after discharge:  Resume normal activities                                                                         Exercise program as instructed      4. WOUND CARE: It is not unusual to have a small amount of bruising appear in the groin area. It is also common to have a tender "knot" develop beneath the skin at the puncture site in the groin. This is scar tissue only and is not a cause for concern or alarm. This tender knot may take several weeks to fully resolve. The bruise will usually spread over several days. If the lump gets bigger, call you doctor immediately.    5. DISCOMFORT: For general discomfort at the puncture site, you may take 1 or 2 Acetaminophen (Tylenol) tablets every 4 hours as needed. (Do not take more than 4000 mg a day)          " "       6. CALL YOUR HEALTHCARE PROVIDER IF YOU START TO HAVE THE FOLLOWING SYMPTOMS:        1. Problems with the affected leg: Pain, discomfort, loss of warmth, numbness or tingling                                                                                                                            2. Problems at the groin site: Bleeding, pain that is sudden/sharp/persistent,                   swelling at site or a change in "lump" size, increased redness or drainage at                     puncture site                                                               3. High fever (101 degrees or higher)       4.  Drowsiness that doesn't get better       5. Weakness or dizziness that doesn't get better            6. Repeated vomiting        7. GO TO  THE EMERGENCY ROOM  for numbness or severe pain or if your foot or leg becomes cold or discolored. Call 911 for uncontrolled bleeding from site (apply direct pressure above site).  "

## 2019-02-01 NOTE — DISCHARGE SUMMARY
Radiology Discharge Summary      Hospital Course: No complications    Admit Date: 1/31/2019  Discharge Date: 1/31/19    Instructions Given to Patient: Yes  Diet: regular diet and Resume prior diet  Activity: activity as tolerated and no driving for today    Description of Condition on Discharge: Stable  Vital Signs (Most Recent): Temp: 97.6 °F (36.4 °C) (01/31/19 1230)  Pulse: 73 (01/31/19 1400)  Resp: 15 (01/31/19 1400)  BP: 132/71 (01/31/19 1400)  SpO2: 100 % (01/31/19 1400)    Discharge Disposition: Home    Discharge Diagnosis: hcc     Follow-up: triple phase ct or mr in 2 months    Geovanni Calero MD  Staff Radiologist  Department of Radiology  Pager: [phone]

## 2019-02-13 DIAGNOSIS — C22.0 HCC (HEPATOCELLULAR CARCINOMA): Primary | ICD-10-CM

## 2019-04-01 ENCOUNTER — HOSPITAL ENCOUNTER (OUTPATIENT)
Dept: RADIOLOGY | Facility: HOSPITAL | Age: 74
Discharge: HOME | End: 2019-04-01
Attending: PHYSICIAN ASSISTANT
Payer: MEDICARE

## 2019-04-01 DIAGNOSIS — C22.0 HCC (HEPATOCELLULAR CARCINOMA): Primary | ICD-10-CM

## 2019-04-01 DIAGNOSIS — C22.0 HCC (HEPATOCELLULAR CARCINOMA): ICD-10-CM

## 2019-04-01 PROCEDURE — 25500020 PHARM REV CODE 255: Performed by: PHYSICIAN ASSISTANT

## 2019-04-01 PROCEDURE — 74177 CT ABD & PELVIS W/CONTRAST: CPT | Mod: TC

## 2019-04-01 RX ADMIN — IOHEXOL 100 ML: 350 INJECTION, SOLUTION INTRAVENOUS at 11:04

## 2019-04-22 ENCOUNTER — TELEPHONE (OUTPATIENT)
Dept: RADIOLOGY | Facility: HOSPITAL | Age: 74
End: 2019-04-22

## 2019-04-22 NOTE — TELEPHONE ENCOUNTER
Patient scheduled for TACE procedure on 4/2919 at 1100 and microwave ablation on 5/8/19 at 1100 with Dr. Calero.

## 2019-04-23 DIAGNOSIS — C22.0 HCC (HEPATOCELLULAR CARCINOMA): Primary | ICD-10-CM

## 2019-04-29 ENCOUNTER — HOSPITAL ENCOUNTER (OUTPATIENT)
Facility: HOSPITAL | Age: 74
Discharge: HOME | End: 2019-04-29
Attending: RADIOLOGY | Admitting: RADIOLOGY
Payer: MEDICARE

## 2019-04-29 VITALS
TEMPERATURE: 98 F | DIASTOLIC BLOOD PRESSURE: 75 MMHG | RESPIRATION RATE: 20 BRPM | OXYGEN SATURATION: 98 % | HEIGHT: 71 IN | SYSTOLIC BLOOD PRESSURE: 138 MMHG | HEART RATE: 75 BPM | WEIGHT: 169 LBS | BODY MASS INDEX: 23.66 KG/M2

## 2019-04-29 DIAGNOSIS — C22.0 HCC (HEPATOCELLULAR CARCINOMA): ICD-10-CM

## 2019-04-29 LAB
AFP SERPL-MCNC: 4.3 NG/ML (ref 0–8.4)
ALBUMIN SERPL BCP-MCNC: 4 G/DL (ref 3.5–5.2)
ALP SERPL-CCNC: 90 U/L (ref 55–135)
ALT SERPL W/O P-5'-P-CCNC: 17 U/L (ref 10–44)
ANION GAP SERPL CALC-SCNC: 9 MMOL/L (ref 8–16)
APTT BLDCRRT: 29.8 SEC (ref 21–32)
AST SERPL-CCNC: 27 U/L (ref 10–40)
BASOPHILS # BLD AUTO: 0.03 K/UL (ref 0–0.2)
BASOPHILS NFR BLD: 0.5 % (ref 0–1.9)
BILIRUB SERPL-MCNC: 0.7 MG/DL (ref 0.1–1)
BUN SERPL-MCNC: 18 MG/DL (ref 8–23)
CALCIUM SERPL-MCNC: 9.7 MG/DL (ref 8.7–10.5)
CHLORIDE SERPL-SCNC: 104 MMOL/L (ref 95–110)
CO2 SERPL-SCNC: 25 MMOL/L (ref 23–29)
CREAT SERPL-MCNC: 1.5 MG/DL (ref 0.5–1.4)
DIFFERENTIAL METHOD: ABNORMAL
EOSINOPHIL # BLD AUTO: 0.6 K/UL (ref 0–0.5)
EOSINOPHIL NFR BLD: 9.6 % (ref 0–8)
ERYTHROCYTE [DISTWIDTH] IN BLOOD BY AUTOMATED COUNT: 14.1 % (ref 11.5–14.5)
EST. GFR  (AFRICAN AMERICAN): 53 ML/MIN/1.73 M^2
EST. GFR  (NON AFRICAN AMERICAN): 46 ML/MIN/1.73 M^2
GLUCOSE SERPL-MCNC: 73 MG/DL (ref 70–110)
HCT VFR BLD AUTO: 46.1 % (ref 40–54)
HGB BLD-MCNC: 15.5 G/DL (ref 14–18)
INR PPP: 0.9 (ref 0.8–1.2)
LYMPHOCYTES # BLD AUTO: 1 K/UL (ref 1–4.8)
LYMPHOCYTES NFR BLD: 15.9 % (ref 18–48)
MCH RBC QN AUTO: 30.2 PG (ref 27–31)
MCHC RBC AUTO-ENTMCNC: 33.6 G/DL (ref 32–36)
MCV RBC AUTO: 90 FL (ref 82–98)
MONOCYTES # BLD AUTO: 0.7 K/UL (ref 0.3–1)
MONOCYTES NFR BLD: 10.7 % (ref 4–15)
NEUTROPHILS # BLD AUTO: 4.1 K/UL (ref 1.8–7.7)
NEUTROPHILS NFR BLD: 63.3 % (ref 38–73)
PLATELET # BLD AUTO: 171 K/UL (ref 150–350)
PMV BLD AUTO: 9.9 FL (ref 9.2–12.9)
POTASSIUM SERPL-SCNC: 3.6 MMOL/L (ref 3.5–5.1)
PROT SERPL-MCNC: 8.7 G/DL (ref 6–8.4)
PROTHROMBIN TIME: 10.3 SEC (ref 9–12.5)
RBC # BLD AUTO: 5.14 M/UL (ref 4.6–6.2)
SODIUM SERPL-SCNC: 138 MMOL/L (ref 136–145)
WBC # BLD AUTO: 6.54 K/UL (ref 3.9–12.7)

## 2019-04-29 PROCEDURE — 85025 COMPLETE CBC W/AUTO DIFF WBC: CPT

## 2019-04-29 PROCEDURE — 27201472 CATH LAB PROCEDURE

## 2019-04-29 PROCEDURE — 63600175 PHARM REV CODE 636 W HCPCS

## 2019-04-29 PROCEDURE — 85730 THROMBOPLASTIN TIME PARTIAL: CPT

## 2019-04-29 PROCEDURE — S0028 INJECTION, FAMOTIDINE, 20 MG: HCPCS | Performed by: PHYSICIAN ASSISTANT

## 2019-04-29 PROCEDURE — 25500020 PHARM REV CODE 255

## 2019-04-29 PROCEDURE — 85610 PROTHROMBIN TIME: CPT

## 2019-04-29 PROCEDURE — 63600175 PHARM REV CODE 636 W HCPCS: Performed by: PHYSICIAN ASSISTANT

## 2019-04-29 PROCEDURE — 25000003 PHARM REV CODE 250: Performed by: PHYSICIAN ASSISTANT

## 2019-04-29 PROCEDURE — 80053 COMPREHEN METABOLIC PANEL: CPT

## 2019-04-29 PROCEDURE — 63600175 PHARM REV CODE 636 W HCPCS: Performed by: RADIOLOGY

## 2019-04-29 PROCEDURE — 25000003 PHARM REV CODE 250

## 2019-04-29 PROCEDURE — S0030 INJECTION, METRONIDAZOLE: HCPCS | Performed by: PHYSICIAN ASSISTANT

## 2019-04-29 PROCEDURE — 82105 ALPHA-FETOPROTEIN SERUM: CPT

## 2019-04-29 PROCEDURE — 99152 MOD SED SAME PHYS/QHP 5/>YRS: CPT

## 2019-04-29 RX ORDER — ONDANSETRON 4 MG/1
4 TABLET, FILM COATED ORAL EVERY 6 HOURS PRN
Status: DISCONTINUED | OUTPATIENT
Start: 2019-04-29 | End: 2019-04-29 | Stop reason: HOSPADM

## 2019-04-29 RX ORDER — SODIUM CHLORIDE 9 MG/ML
INJECTION, SOLUTION INTRAVENOUS CONTINUOUS
Status: DISCONTINUED | OUTPATIENT
Start: 2019-04-29 | End: 2019-04-29 | Stop reason: HOSPADM

## 2019-04-29 RX ORDER — FAMOTIDINE 20 MG/50ML
20 INJECTION, SOLUTION INTRAVENOUS ONCE
Status: COMPLETED | OUTPATIENT
Start: 2019-04-29 | End: 2019-04-29

## 2019-04-29 RX ORDER — DIPHENHYDRAMINE HYDROCHLORIDE 50 MG/ML
25 INJECTION INTRAMUSCULAR; INTRAVENOUS ONCE
Status: COMPLETED | OUTPATIENT
Start: 2019-04-29 | End: 2019-04-29

## 2019-04-29 RX ORDER — METRONIDAZOLE 500 MG/100ML
500 INJECTION, SOLUTION INTRAVENOUS ONCE
Status: COMPLETED | OUTPATIENT
Start: 2019-04-29 | End: 2019-04-29

## 2019-04-29 RX ADMIN — ONDANSETRON HYDROCHLORIDE 16 MG: 2 INJECTION, SOLUTION INTRAMUSCULAR; INTRAVENOUS at 10:04

## 2019-04-29 RX ADMIN — DIPHENHYDRAMINE HYDROCHLORIDE 25 MG: 50 INJECTION INTRAMUSCULAR; INTRAVENOUS at 10:04

## 2019-04-29 RX ADMIN — HYDROCORTISONE SODIUM SUCCINATE 200 MG: 100 INJECTION, POWDER, FOR SOLUTION INTRAMUSCULAR; INTRAVENOUS at 10:04

## 2019-04-29 RX ADMIN — SODIUM CHLORIDE: 9 INJECTION, SOLUTION INTRAVENOUS at 09:04

## 2019-04-29 RX ADMIN — FAMOTIDINE 20 MG: 20 INJECTION, SOLUTION INTRAVENOUS at 10:04

## 2019-04-29 RX ADMIN — DOXORUBICIN HYDROCHLORIDE 76 MG: 2 INJECTION, SOLUTION INTRAVENOUS at 12:04

## 2019-04-29 RX ADMIN — METRONIDAZOLE 500 MG: 500 SOLUTION INTRAVENOUS at 11:04

## 2019-04-29 NOTE — PROGRESS NOTES
1600 DR PALOMARES AT BEDSIDE TO SEE PT. SPOKE WITH PT AND WIFE IN REGARDS TO TODAY'S PROCEDURE. OK TO DISCHARGE PT. NOW.

## 2019-04-29 NOTE — PLAN OF CARE
1600 VSS. PT. AMB IN UNIT TO BR. RT GROIN DRESSING DRY AND INTACT DR PALOMARES HERE TO SEE PT AND WIFE . DISCHARGE INSTRUCTIONS GIVEN TO PT. AND WIFE NOW CM AND PIV DC'D REDRESSED 1610 DISCHARGED PER W/C WITH BELONGINGS ACCOMPANIED BY THIS RN AND WIFE.

## 2019-04-29 NOTE — H&P
Radiology History & Physical      SUBJECTIVE:     Chief Complaint: hcc    History of Present Illness:  Shahab Pompa is a 73 y.o. male who presents for chemoembolizaiton  Past Medical History:   Diagnosis Date    Cancer     prostate, bladder, and ear    Fibrosis lung     GERD (gastroesophageal reflux disease)     Hepatitis C     tx'd in 2016 w/ Harvoni - achieved SVR    Neuromuscular disorder      Past Surgical History:   Procedure Laterality Date    BLADDER SURGERY      cancer removal    CAROTID ENDARTERECTOMY Right     INGUINAL HERNIA REPAIR Bilateral     KNEE SURGERY      LAPAROSCOPY Exploratory N/A 1/4/2019    Performed by [facility]    PROSTATE SURGERY      cancer removal    Y90 mapping N/A 1/24/2019    Performed by Ge[facility] MD at [facility] CATH LAB    Y90 POST N/A 1/31/2019    Performed by Geovanni Calero MD at [facility] CATH LAB       Home Meds:   Prior to Admission medications    Medication Sig Start Date End Date Taking? Authorizing Provider   aspirin-sod bicarb-citric acid (SHON-SELTZER) 324 mg TbEF Take 325 mg by mouth every 6 (six) hours as needed.   Yes Historical Provider, MD   COMBIVENT RESPIMAT  mcg/actuation inhaler every evening.  5/28/16  Yes Historical Provider, MD   cyclobenzaprine HCl (FLEXERIL ORAL) Take by mouth every evening.   Yes Historical Provider, MD   desonide (DESOWEN) 0.05 % lotion Apply topically. Once in 2 days for rash   Yes Historical Provider, MD   eszopiclone (LUNESTA) 3 mg Tab Take 3 mg by mouth every evening.   Yes Historical Provider, MD   lactulose (CHRONULAC) 10 gram/15 mL solution Take by mouth as needed.   Yes Historical Provider, MD   mometasone 0.1% (ELOCON) 0.1 % cream Apply topically as needed (rash).   Yes Historical Provider, MD   morphine (MSIR) 15 MG tablet Take 15 mg by mouth every 8 (eight) hours as needed for Pain.    Yes Historical Provider, MD   amLODIPine (NORVASC) 5 MG tablet Take 5 mg by mouth nightly as  needed.    Historical Provider, MD   oxyCODONE (OXY-IR) 5 mg Cap Take 1 capsule (5 mg total) by mouth every 6 (six) hours as needed for Pain. 1/4/19 4/29/19  Neil Siegel MD     Anticoagulants/Antiplatelets: no anticoagulation    Allergies: Review of patient's allergies indicates:  No Known Allergies  Sedation History:  have not been any systemic reactions        OBJECTIVE:     Vital Signs (Most Recent)  Temp: 97.7 °F (36.5 °C) (04/29/19 1020)  Pulse: 73 (04/29/19 1020)  Resp: 20 (04/29/19 1020)  BP: 133/80 (04/29/19 1020)  SpO2: 98 % (04/29/19 1020)    Physical Exam:  ASA: 2  Mallampati: 2    General: no acute distress  Mental Status: alert and oriented to person, place and time  HEENT: normocephalic, atraumatic  Chest: unlabored breathing  Heart: regular heart rate  Abdomen: nondistended  Extremity: moves all extremities    Laboratory  Lab Results   Component Value Date    INR 0.9 04/29/2019       Lab Results   Component Value Date    WBC 6.54 04/29/2019    HGB 15.5 04/29/2019    HCT 46.1 04/29/2019    MCV 90 04/29/2019     04/29/2019      Lab Results   Component Value Date    GLU 73 04/29/2019     04/29/2019    K 3.6 04/29/2019     04/29/2019    CO2 25 04/29/2019    BUN 18 04/29/2019    CREATININE 1.5 (H) 04/29/2019    CALCIUM 9.7 04/29/2019    ALT 17 04/29/2019    AST 27 04/29/2019    ALBUMIN 4.0 04/29/2019    BILITOT 0.7 04/29/2019    BILIDIR 0.3 04/26/2016       ASSESSMENT/PLAN:     Sedation Plan: moderate  Patient will undergo chemoembolization right hepatic hcc.    Geovanni Calero MD  Staff Radiologist  Department of Radiology  Pager: [phone]

## 2019-04-29 NOTE — PROCEDURES
Radiology Post-Procedure Note    Pre Op Diagnosis: Hepatocellular carcinoma    Post Op Diagnosis: Hepatocellular carcinoma    Procedure: Chemoembolization    Procedure Performed by: Geovanni Calero MD    Written Informed Consent Obtained: Yes    Specimen Removed: None    Estimated Blood Loss: Minimal    Findings:     After placement of a right femoral artery sheath, a 5-Persian catheter was inserted and angiography of the celiac artery and superior mesenteric artery for anatomic evaluation and localization of hepatic tumor.  A microcatheter was introduced into feeding arteries of a right hepatic lobe tumor and LC beads coated with doxorubicin were injected until near stagnant flow was achieved.  Post procedural angiography revealed no complications.    Right femoral artery angiogram was performed and the sheath was removed.  Hemostasis was achieved using exoseal technique.  There was no hematoma at the time of hemostasis.    The patient tolerated procedure well.  Please see Imaging report for further details.    Geovanni Calero MD  Staff Radiologist  Department of Radiology  Pager: [phone]

## 2019-05-01 NOTE — DISCHARGE SUMMARY
Radiology Discharge Summary      Hospital Course: No complications    Admit Date: 4/29/2019  Discharge Date: 4/29/19    Instructions Given to Patient: Yes  Diet: regular diet and Resume prior diet  Activity: activity as tolerated and no driving for today    Description of Condition on Discharge: Stable  Vital Signs (Most Recent): Temp: 97.9 °F (36.6 °C) (04/29/19 1250)  Pulse: 75 (04/29/19 1600)  Resp: 20 (04/29/19 1600)  BP: 138/75 (04/29/19 1600)  SpO2: 98 % (04/29/19 1600)    Discharge Disposition: Home    Discharge Diagnosis: hcc     Follow-up: follow up for ablation in 1-2 weeks    Geovanni Calero MD  Staff Radiologist  Department of Radiology  Pager: [phone]

## 2019-05-07 ENCOUNTER — TELEPHONE (OUTPATIENT)
Dept: RADIOLOGY | Facility: HOSPITAL | Age: 74
End: 2019-05-07

## 2019-05-07 ENCOUNTER — ANESTHESIA EVENT (OUTPATIENT)
Dept: SURGERY | Facility: HOSPITAL | Age: 74
End: 2019-05-07
Payer: MEDICARE

## 2019-05-08 ENCOUNTER — HOSPITAL ENCOUNTER (OUTPATIENT)
Facility: HOSPITAL | Age: 74
Discharge: HOME | End: 2019-05-08
Attending: RADIOLOGY | Admitting: RADIOLOGY
Payer: MEDICARE

## 2019-05-08 ENCOUNTER — HOSPITAL ENCOUNTER (OUTPATIENT)
Dept: RADIOLOGY | Facility: HOSPITAL | Age: 74
Discharge: HOME | End: 2019-05-08
Attending: PHYSICIAN ASSISTANT
Payer: MEDICARE

## 2019-05-08 ENCOUNTER — ANESTHESIA (OUTPATIENT)
Dept: SURGERY | Facility: HOSPITAL | Age: 74
End: 2019-05-08
Payer: MEDICARE

## 2019-05-08 VITALS
SYSTOLIC BLOOD PRESSURE: 146 MMHG | WEIGHT: 165 LBS | HEIGHT: 71 IN | DIASTOLIC BLOOD PRESSURE: 81 MMHG | RESPIRATION RATE: 18 BRPM | BODY MASS INDEX: 23.1 KG/M2 | OXYGEN SATURATION: 97 % | HEART RATE: 75 BPM

## 2019-05-08 DIAGNOSIS — C22.0 HCC (HEPATOCELLULAR CARCINOMA): ICD-10-CM

## 2019-05-08 PROCEDURE — 25000003 PHARM REV CODE 250: Performed by: NURSE ANESTHETIST, CERTIFIED REGISTERED

## 2019-05-08 PROCEDURE — 63600175 PHARM REV CODE 636 W HCPCS

## 2019-05-08 PROCEDURE — 71000033 HC RECOVERY, INTIAL HOUR

## 2019-05-08 PROCEDURE — 63600175 PHARM REV CODE 636 W HCPCS: Performed by: NURSE ANESTHETIST, CERTIFIED REGISTERED

## 2019-05-08 PROCEDURE — 37000009 HC ANESTHESIA EA ADD 15 MINS

## 2019-05-08 PROCEDURE — 25000003 PHARM REV CODE 250: Performed by: ANESTHESIOLOGY

## 2019-05-08 PROCEDURE — 27202065 CT GUIDANCE TUMOR ABLATION

## 2019-05-08 PROCEDURE — 25000003 PHARM REV CODE 250: Performed by: PHYSICIAN ASSISTANT

## 2019-05-08 PROCEDURE — 37000008 HC ANESTHESIA 1ST 15 MINUTES

## 2019-05-08 PROCEDURE — 63600175 PHARM REV CODE 636 W HCPCS: Performed by: ANESTHESIOLOGY

## 2019-05-08 PROCEDURE — 71000039 HC RECOVERY, EACH ADD'L HOUR

## 2019-05-08 PROCEDURE — 47382 PERCUT ABLATE LIVER RF: CPT

## 2019-05-08 RX ORDER — FENTANYL CITRATE 50 UG/ML
INJECTION, SOLUTION INTRAMUSCULAR; INTRAVENOUS
Status: DISCONTINUED | OUTPATIENT
Start: 2019-05-08 | End: 2019-05-08

## 2019-05-08 RX ORDER — ONDANSETRON HYDROCHLORIDE 8 MG/1
8 TABLET, FILM COATED ORAL EVERY 8 HOURS PRN
Qty: 15 TABLET | Refills: 0 | Status: ON HOLD | OUTPATIENT
Start: 2019-05-08 | End: 2020-08-11

## 2019-05-08 RX ORDER — HYDRALAZINE HYDROCHLORIDE 20 MG/ML
5 INJECTION INTRAMUSCULAR; INTRAVENOUS EVERY 10 MIN PRN
Status: COMPLETED | OUTPATIENT
Start: 2019-05-08 | End: 2019-05-08

## 2019-05-08 RX ORDER — SODIUM CHLORIDE 0.9 % (FLUSH) 0.9 %
3 SYRINGE (ML) INJECTION EVERY 8 HOURS
Status: DISCONTINUED | OUTPATIENT
Start: 2019-05-08 | End: 2019-05-08 | Stop reason: HOSPADM

## 2019-05-08 RX ORDER — PHENYLEPHRINE HYDROCHLORIDE 10 MG/ML
INJECTION INTRAVENOUS
Status: DISCONTINUED | OUTPATIENT
Start: 2019-05-08 | End: 2019-05-08

## 2019-05-08 RX ORDER — GLYCOPYRROLATE 0.2 MG/ML
INJECTION INTRAMUSCULAR; INTRAVENOUS
Status: DISCONTINUED | OUTPATIENT
Start: 2019-05-08 | End: 2019-05-08

## 2019-05-08 RX ORDER — ONDANSETRON HYDROCHLORIDE 2 MG/ML
INJECTION, SOLUTION INTRAMUSCULAR; INTRAVENOUS
Status: DISCONTINUED | OUTPATIENT
Start: 2019-05-08 | End: 2019-05-08

## 2019-05-08 RX ORDER — SODIUM CHLORIDE, SODIUM LACTATE, POTASSIUM CHLORIDE, CALCIUM CHLORIDE 600; 310; 30; 20 MG/100ML; MG/100ML; MG/100ML; MG/100ML
INJECTION, SOLUTION INTRAVENOUS CONTINUOUS PRN
Status: DISCONTINUED | OUTPATIENT
Start: 2019-05-08 | End: 2019-05-08

## 2019-05-08 RX ORDER — MEPERIDINE HYDROCHLORIDE 50 MG/ML
12.5 INJECTION INTRAMUSCULAR; INTRAVENOUS; SUBCUTANEOUS ONCE AS NEEDED
Status: DISCONTINUED | OUTPATIENT
Start: 2019-05-08 | End: 2019-05-08 | Stop reason: HOSPADM

## 2019-05-08 RX ORDER — OXYCODONE HYDROCHLORIDE 5 MG/1
5 TABLET ORAL
Status: DISCONTINUED | OUTPATIENT
Start: 2019-05-08 | End: 2019-05-08 | Stop reason: HOSPADM

## 2019-05-08 RX ORDER — VECURONIUM BROMIDE FOR INJECTION 1 MG/ML
INJECTION, POWDER, LYOPHILIZED, FOR SOLUTION INTRAVENOUS
Status: DISCONTINUED | OUTPATIENT
Start: 2019-05-08 | End: 2019-05-08

## 2019-05-08 RX ORDER — SUCCINYLCHOLINE CHLORIDE 20 MG/ML
INJECTION INTRAMUSCULAR; INTRAVENOUS
Status: DISCONTINUED | OUTPATIENT
Start: 2019-05-08 | End: 2019-05-08

## 2019-05-08 RX ORDER — PROPOFOL 10 MG/ML
VIAL (ML) INTRAVENOUS
Status: DISCONTINUED | OUTPATIENT
Start: 2019-05-08 | End: 2019-05-08

## 2019-05-08 RX ORDER — MORPHINE SULFATE 4 MG/ML
2 INJECTION, SOLUTION INTRAMUSCULAR; INTRAVENOUS EVERY 5 MIN PRN
Status: DISCONTINUED | OUTPATIENT
Start: 2019-05-08 | End: 2019-05-08 | Stop reason: HOSPADM

## 2019-05-08 RX ORDER — SODIUM CHLORIDE 0.9 % (FLUSH) 0.9 %
3 SYRINGE (ML) INJECTION
Status: DISCONTINUED | OUTPATIENT
Start: 2019-05-08 | End: 2019-05-08 | Stop reason: HOSPADM

## 2019-05-08 RX ORDER — CEFAZOLIN SODIUM 1 G/50ML
1 SOLUTION INTRAVENOUS ONCE
Status: DISCONTINUED | OUTPATIENT
Start: 2019-05-08 | End: 2019-05-08

## 2019-05-08 RX ORDER — LIDOCAINE HYDROCHLORIDE 10 MG/ML
1 INJECTION, SOLUTION EPIDURAL; INFILTRATION; INTRACAUDAL; PERINEURAL ONCE
Status: DISCONTINUED | OUTPATIENT
Start: 2019-05-08 | End: 2019-05-08 | Stop reason: HOSPADM

## 2019-05-08 RX ORDER — NEOSTIGMINE METHYLSULFATE 1 MG/ML
INJECTION, SOLUTION INTRAVENOUS
Status: DISCONTINUED | OUTPATIENT
Start: 2019-05-08 | End: 2019-05-08

## 2019-05-08 RX ORDER — METOCLOPRAMIDE HYDROCHLORIDE 5 MG/ML
10 INJECTION INTRAMUSCULAR; INTRAVENOUS EVERY 10 MIN PRN
Status: DISCONTINUED | OUTPATIENT
Start: 2019-05-08 | End: 2019-05-08 | Stop reason: HOSPADM

## 2019-05-08 RX ADMIN — VECURONIUM BROMIDE FOR INJECTION 2 MG: 1 INJECTION, POWDER, LYOPHILIZED, FOR SOLUTION INTRAVENOUS at 12:05

## 2019-05-08 RX ADMIN — MORPHINE SULFATE 2 MG: 4 INJECTION INTRAVENOUS at 02:05

## 2019-05-08 RX ADMIN — CEFAZOLIN 1 G: 330 INJECTION, POWDER, FOR SOLUTION INTRAMUSCULAR; INTRAVENOUS at 12:05

## 2019-05-08 RX ADMIN — PHENYLEPHRINE HYDROCHLORIDE 100 MCG: 10 INJECTION INTRAVENOUS at 12:05

## 2019-05-08 RX ADMIN — FENTANYL CITRATE 50 MCG: 50 INJECTION, SOLUTION INTRAMUSCULAR; INTRAVENOUS at 01:05

## 2019-05-08 RX ADMIN — HYDRALAZINE HYDROCHLORIDE 5 MG: 20 INJECTION INTRAMUSCULAR; INTRAVENOUS at 02:05

## 2019-05-08 RX ADMIN — MORPHINE SULFATE 2 MG: 4 INJECTION INTRAVENOUS at 03:05

## 2019-05-08 RX ADMIN — ROBINUL 0.6 MG: 0.2 INJECTION INTRAMUSCULAR; INTRAVENOUS at 01:05

## 2019-05-08 RX ADMIN — SODIUM CHLORIDE, SODIUM LACTATE, POTASSIUM CHLORIDE, AND CALCIUM CHLORIDE: 600; 310; 30; 20 INJECTION, SOLUTION INTRAVENOUS at 12:05

## 2019-05-08 RX ADMIN — VECURONIUM BROMIDE FOR INJECTION 1 MG: 1 INJECTION, POWDER, LYOPHILIZED, FOR SOLUTION INTRAVENOUS at 12:05

## 2019-05-08 RX ADMIN — PROPOFOL 150 MG: 10 INJECTION, EMULSION INTRAVENOUS at 12:05

## 2019-05-08 RX ADMIN — SUCCINYLCHOLINE CHLORIDE 120 MG: 20 INJECTION, SOLUTION INTRAMUSCULAR; INTRAVENOUS at 12:05

## 2019-05-08 RX ADMIN — ONDANSETRON 4 MG: 2 INJECTION, SOLUTION INTRAMUSCULAR; INTRAVENOUS at 01:05

## 2019-05-08 RX ADMIN — FENTANYL CITRATE 50 MCG: 50 INJECTION, SOLUTION INTRAMUSCULAR; INTRAVENOUS at 12:05

## 2019-05-08 RX ADMIN — OXYCODONE HYDROCHLORIDE 5 MG: 5 TABLET ORAL at 02:05

## 2019-05-08 RX ADMIN — NEOSTIGMINE METHYLSULFATE 5 MG: 1 INJECTION INTRAVENOUS at 01:05

## 2019-05-08 NOTE — TRANSFER OF CARE
Anesthesia Transfer of Care Note    Patient: Shahab Pompa    Procedure(s) Performed: Procedure(s) (LRB):  microwave ablation to be done in CT need CRNA and cart (N/A)    Patient location: PACU    Anesthesia Type: general    Transport from OR: Transported from OR on room air with adequate spontaneous ventilation    Post pain: adequate analgesia    Post assessment: no apparent anesthetic complications    Post vital signs: stable    Level of consciousness: awake and alert    Nausea/Vomiting: no nausea/vomiting    Complications: none    Transfer of care protocol was followed      Last vitals:   Visit Vitals  BP (!) 158/73   Pulse 69   Temp 36.6 °C (97.9 °F) (Temporal)   Resp 14   SpO2 100%

## 2019-05-08 NOTE — DISCHARGE SUMMARY
Radiology Discharge Summary      Hospital Course: No complications    Admit Date: 5/8/2019  Discharge Date: 05/08/2019     Instructions Given to Patient: Yes  Diet: regular diet and Resume prior diet  Activity: activity as tolerated and no driving for today    Description of Condition on Discharge: Stable  Vital Signs (Most Recent): Pulse: 75 (05/08/19 1035)  Resp: 18 (05/08/19 1035)  BP: (!) 146/81 (05/08/19 1035)  SpO2: 97 % (05/08/19 1035)    Discharge Disposition: Home    Discharge Diagnosis: hcc     Follow-up: triple phase ct in 1 month    Geovanni Calero MD  Staff Radiologist  Department of Radiology  Pager: [phone]

## 2019-05-08 NOTE — DISCHARGE SUMMARY
Pre Op Diagnosis: liver lesion     Post Op Diagnosis: same     Procedure:  Liver lesion ablation     Procedure performed by: Abdias CLEMENT, Armaan COLLINS     Written Informed Consent Obtained: Yes     Specimen Removed:  no     Estimated Blood Loss:  minimal     Findings: Local anesthesia and moderate sedation were used.     The patient tolerated the procedure well and there were no complications.      Sterile technique was performed in the lateral RUQ, lidocaine was used as a local anesthetic.  Ablation of the liver lesion.  Pt tolerated the procedure well without immediate complications.  Please see radiologist report for details. F/u with PCP and/or ordering physician.

## 2019-05-08 NOTE — ANESTHESIA RELEASE NOTE
Anesthesia Release from PACU Note    Patient: Shahab Pompa    Procedure(s) Performed: Procedure(s) (LRB):  microwave ablation to be done in CT need CRNA and cart (N/A)    Anesthesia type: general    Post pain: Adequate analgesia    Post assessment: no apparent anesthetic complications, tolerated procedure well and no evidence of recall    Last Vitals:   Visit Vitals  BP (!) 158/73   Pulse 69   Temp 36.6 °C (97.9 °F) (Temporal)   Resp 14   SpO2 100%       Post vital signs: stable    Level of consciousness: awake, alert  and oriented    Nausea/Vomiting: no nausea/no vomiting    Complications: none    Airway Patency: patent    Respiratory: unassisted, spontaneous ventilation, face mask    Cardiovascular: stable    Hydration: euvolemic

## 2019-05-08 NOTE — ANESTHESIA POSTPROCEDURE EVALUATION
Anesthesia Post Evaluation    Patient: Shahab Pompa    Procedure(s) Performed: Procedure(s) (LRB):  microwave ablation to be done in CT need CRNA and cart (N/A)    Final Anesthesia Type: general  Patient location during evaluation: PACU  Patient participation: Yes- Able to Participate  Level of consciousness: awake and alert  Post-procedure vital signs: reviewed and stable  Pain management: adequate  Airway patency: patent  PONV status at discharge: No PONV  Anesthetic complications: no      Cardiovascular status: blood pressure returned to baseline  Respiratory status: unassisted  Hydration status: euvolemic  Follow-up not needed.          Vitals Value Taken Time   /77 5/8/2019  2:51 PM   Temp 36.6 °C (97.9 °F) 5/8/2019  1:33 PM   Pulse 77 5/8/2019  2:51 PM   Resp 21 5/8/2019  2:51 PM   SpO2 97 % 5/8/2019  2:51 PM   Vitals shown include unvalidated device data.      No case tracking events are documented in the log.      Pain/Nuno Score: Pain Rating Prior to Med Admin: 7 (5/8/2019  2:50 PM)  Nuno Score: 10 (5/8/2019  2:30 PM)

## 2019-05-08 NOTE — H&P
Radiology History & Physical      SUBJECTIVE:     Chief Complaint: HCC    History of Present Illness:  Shahab Pompa is a 73 y.o. male who presents for liver microwave ablation  Past Medical History:   Diagnosis Date    Cancer     prostate, bladder, and ear    Fibrosis lung     GERD (gastroesophageal reflux disease)     Hepatitis C     tx'd in 2016 w/ Harvoni - achieved SVR    Neuromuscular disorder      Past Surgical History:   Procedure Laterality Date    BLADDER SURGERY      cancer removal    CAROTID ENDARTERECTOMY Right     INGUINAL HERNIA REPAIR Bilateral     KNEE SURGERY      LAPAROSCOPY Exploratory N/A 1/4/2019    Performed by Ed[facility] MD at [facility] OR [facility]    PROSTATE SURGERY      cancer removal    Y90 mapping N/A 1/24/2019    Performed by Ge[facility] MD at [facility] CATH LAB    Y90 POST N/A 1/31/2019    Performed by Geovanni Calero MD at [facility] CATH LAB       Home Meds:   Prior to Admission medications    Medication Sig Start Date End Date Taking? Authorizing Provider   amLODIPine (NORVASC) 5 MG tablet Take 5 mg by mouth nightly as needed.   Yes Historical Provider, MD   aspirin-sod bicarb-citric acid (SHON-SELTZER) 324 mg TbEF Take 325 mg by mouth every 6 (six) hours as needed.   Yes Historical Provider, MD   COMBIVENT RESPIMAT  mcg/actuation inhaler every evening.  5/28/16  Yes Historical Provider, MD   cyclobenzaprine HCl (FLEXERIL ORAL) Take by mouth every evening.   Yes Historical Provider, MD   desonide (DESOWEN) 0.05 % lotion Apply topically. Once in 2 days for rash   Yes Historical Provider, MD   eszopiclone (LUNESTA) 3 mg Tab Take 3 mg by mouth every evening.   Yes Historical Provider, MD   lactulose (CHRONULAC) 10 gram/15 mL solution Take by mouth as needed.   Yes Historical Provider, MD   mometasone 0.1% (ELOCON) 0.1 % cream Apply topically as needed (rash).   Yes Historical Provider, MD   morphine (MSIR) 15 MG tablet Take 15 mg by mouth every 8 (eight) hours as needed  for Pain.    Yes Historical Provider, MD     Anticoagulants/Antiplatelets: aspirin    Allergies: Review of patient's allergies indicates:  No Known Allergies  Sedation History:  have not been any systemic reactions            OBJECTIVE:     Vital Signs (Most Recent)  Pulse: 75 (05/08/19 1035)  Resp: 18 (05/08/19 1035)  BP: (!) 146/81 (05/08/19 1035)  SpO2: 97 % (05/08/19 1035)    Physical Exam:    General: no acute distress  Mental Status: alert and oriented to person, place and time  HEENT: normocephalic, atraumatic  Chest: unlabored breathing  Abdomen: nondistended  Extremity: moves all extremities    Laboratory  Lab Results   Component Value Date    INR 0.9 04/29/2019       Lab Results   Component Value Date    WBC 6.54 04/29/2019    HGB 15.5 04/29/2019    HCT 46.1 04/29/2019    MCV 90 04/29/2019     04/29/2019      Lab Results   Component Value Date    GLU 73 04/29/2019     04/29/2019    K 3.6 04/29/2019     04/29/2019    CO2 25 04/29/2019    BUN 18 04/29/2019    CREATININE 1.5 (H) 04/29/2019    CALCIUM 9.7 04/29/2019    ALT 17 04/29/2019    AST 27 04/29/2019    ALBUMIN 4.0 04/29/2019    BILITOT 0.7 04/29/2019    BILIDIR 0.3 04/26/2016       ASSESSMENT/PLAN:     Sedation Plan: general  Patient will undergo microwave ablation right hepatic lesion.    Geovanni Calero MD  Staff Radiologist  Department of Radiology  Pager: [phone]

## 2019-05-08 NOTE — PROCEDURES
Radiology Post-Procedure Note    Pre Op Diagnosis: hcc  Post Op Diagnosis: Same    Procedure: liver ablation    Procedure performed by: Dr Geovanni Calero    Written Informed Consent Obtained: Yes  Specimen Removed: NO  Estimated Blood Loss: Minimal    Findings:   No complications of right hepatic liver ablation.     Patient tolerated procedure well.    Geovanni Calero MD  Staff Radiologist  Department of Radiology  Pager: [phone]

## 2019-05-08 NOTE — DISCHARGE INSTRUCTIONS
Recovery After Procedural Sedation (Adult)  You have been given medicine by vein to make you sleep during your surgery. This may have included both a pain medicine and sleeping medicine. Most of the effects have worn off. But you may still have some drowsiness for the next 6 to 8 hours.  Home care  Follow these guidelines when you get home:  · For the next 8 hours, you should be watched by a responsible adult. This person should make sure your condition is not getting worse.  · Don't drink any alcohol for the next 24 hours.  · Don't drive, operate dangerous machinery, or make important business or personal decisions during the next 24 hours.  Note: Your healthcare provider may tell you not to take any medicine by mouth for pain or sleep in the next 4 hours. These medicines may react with the medicines you were given in the hospital. This could cause a much stronger response than usual.  Follow-up care  Follow up with your healthcare provider if you are not alert and back to your usual level of activity within 12 hours.  When to seek medical advice  Call your healthcare provider right away if any of these occur:  · Drowsiness gets worse  · Weakness or dizziness gets worse  · Repeated vomiting  · You can't be awakened   Date Last Reviewed: 10/18/2016  © 2357-5446 [employer]. [address] [place] [address], [place] [address]. All rights reserved. This information is not intended as a substitute for professional medical care. Always follow your healthcare professional's instructions.        Understanding Radiofrequency Ablation (RFA) of Liver Tumors    Radiofrequency ablation (RFA) is a way to treat cancer that is growing in the liver. The treatment uses heat to kill cancer cells. A type of radio wave is sent into a liver tumor through wires (electrodes). This creates heat that kills the cells of the tumor without harming too many nearby healthy cells.  How to say it  [id number]-teresita [id number]   Why  RFA of liver tumors is done  Radiofrequency ablation may be done if you cannot have liver surgery. Or it may be done in addition to surgery. Its most often done if you have a small number of tumors in your liver that are not over a certain size.  How RFA of liver tumors is done  The treatment is done in a hospital, and takes 1 to 3 hours. During the procedure:  · You are given medicine to make you relax or sleep through the treatment. The area to be treated may be numbed with medicine.  · The healthcare provider makes a small cut (incision) in your skin. In some cases, the provider makes several cuts and a long, thin tube with a tiny light and camera on the end (laparoscope) is put through one of the cuts. This is to help the provider see the procedure. In other cases, the provider may make one large cut instead.  · The provider puts a metal probe through the cut and into your liver. The probe may have several wires called electrodes. Or the probe may be a single electrode the size of a needle. The provider then uses imaging such as ultrasound or CT to help guide the probe into the tumor.  · The provider connects the probe to a machine that sends radio waves through the electrodes. This creates heat that kills the cells in the tumor. This is done for each tumor that needs to be treated. The tools are then removed, and the cuts in the skin are closed with stitches (sutures).  · After the treatment, you will stay in the hospital for 1 or more nights. Or you may be able to go home the same day.  Risks of RFA of liver tumors  · Bleeding from the liver  · Leaking of bile from the liver  · Infection  · Damage to nearby organs, such as the gallbladder, intestines, or lungs  · Aches, fever, and upset stomach (nausea) for several days to several weeks (postablation syndrome)  · Need to repeat the procedure  Date Last Reviewed: 6/1/2016  © 4544-8847 [employer]. [address] [place] [address], [place] [address]. All  rights reserved. This information is not intended as a substitute for professional medical care. Always follow your healthcare professional's instructions.

## 2019-05-08 NOTE — PLAN OF CARE
Updated pt and wife on current POC and discharge instructions, no questions noted. Verbalized understanding.

## 2019-05-08 NOTE — SEDATION DOCUMENTATION
Patient brought to CT room, Anesthesia at bedside.  VS monitored per anesthesia.  Patient placed on table left lateral position

## 2019-05-08 NOTE — SEDATION DOCUMENTATION
Patient brought to Surgery Recovery with CRNA.  Report given to MARII Jaramillo  Notified wife in waiting room that pt procedure is complete and he is in recovery.

## 2019-05-08 NOTE — ANESTHESIA PREPROCEDURE EVALUATION
05/08/2019  Shahab Pompa is a 73 y.o., male.    Pre-op Assessment    I have reviewed the Patient Summary Reports.     I have reviewed the Nursing Notes.   I have reviewed the Medications.     Review of Systems  Social:  Smoker    Hematology/Oncology:        Oncology Comments: Liver CA   Cardiovascular:   Hypertension    Renal/:   Chronic Renal Disease, CRI    Hepatic/GI:   GERD    Neurological:   Neuromuscular Disease,           Anesthesia Plan  Type of Anesthesia, risks & benefits discussed:  Anesthesia Type:  general  Patient's Preference:   Intra-op Monitoring Plan: standard ASA monitors  Intra-op Monitoring Plan Comments:   Post Op Pain Control Plan:   Post Op Pain Control Plan Comments:   Induction:   IV  Beta Blocker:         Informed Consent: Patient understands risks and agrees with Anesthesia plan.  Questions answered.   ASA Score: 3     Day of Surgery Review of History & Physical:

## 2019-05-09 VITALS
OXYGEN SATURATION: 98 % | RESPIRATION RATE: 19 BRPM | SYSTOLIC BLOOD PRESSURE: 125 MMHG | TEMPERATURE: 98 F | HEART RATE: 72 BPM | DIASTOLIC BLOOD PRESSURE: 71 MMHG

## 2019-05-16 ENCOUNTER — TELEPHONE (OUTPATIENT)
Dept: RADIOLOGY | Facility: HOSPITAL | Age: 74
End: 2019-05-16

## 2019-05-16 NOTE — TELEPHONE ENCOUNTER
Follow-up call with patient. Pt states he is still sore but feeling better. Scheduled f/u triple phase CT scan for 6/7/19.

## 2019-05-17 DIAGNOSIS — K76.9 LIVER LESION: ICD-10-CM

## 2019-06-07 ENCOUNTER — HOSPITAL ENCOUNTER (OUTPATIENT)
Dept: RADIOLOGY | Facility: HOSPITAL | Age: 74
Discharge: HOME | End: 2019-06-07
Attending: RADIOLOGY
Payer: MEDICARE

## 2019-06-07 DIAGNOSIS — K76.9 LIVER LESION: Primary | ICD-10-CM

## 2019-06-07 DIAGNOSIS — K76.9 LIVER LESION: ICD-10-CM

## 2019-06-07 PROCEDURE — 25500020 PHARM REV CODE 255: Performed by: RADIOLOGY

## 2019-06-07 PROCEDURE — 74170 CT ABD WO CNTRST FLWD CNTRST: CPT | Mod: TC

## 2019-06-07 RX ADMIN — IOHEXOL 100 ML: 350 INJECTION, SOLUTION INTRAVENOUS at 03:06

## 2019-06-11 ENCOUNTER — TELEPHONE (OUTPATIENT)
Dept: RADIOLOGY | Facility: HOSPITAL | Age: 74
End: 2019-06-11

## 2019-06-17 ENCOUNTER — TELEPHONE (OUTPATIENT)
Dept: RADIOLOGY | Facility: HOSPITAL | Age: 74
End: 2019-06-17

## 2019-06-18 ENCOUNTER — HOSPITAL ENCOUNTER (OUTPATIENT)
Facility: HOSPITAL | Age: 74
Discharge: HOME | End: 2019-06-18
Attending: RADIOLOGY | Admitting: RADIOLOGY
Payer: MEDICARE

## 2019-06-18 ENCOUNTER — ANESTHESIA (OUTPATIENT)
Dept: SURGERY | Facility: HOSPITAL | Age: 74
End: 2019-06-18
Payer: MEDICARE

## 2019-06-18 ENCOUNTER — ANESTHESIA EVENT (OUTPATIENT)
Dept: SURGERY | Facility: HOSPITAL | Age: 74
End: 2019-06-18
Payer: MEDICARE

## 2019-06-18 DIAGNOSIS — K76.9 LIVER LESION: ICD-10-CM

## 2019-06-18 PROCEDURE — 37000008 HC ANESTHESIA 1ST 15 MINUTES

## 2019-06-18 PROCEDURE — 47382 PERCUT ABLATE LIVER RF: CPT

## 2019-06-18 PROCEDURE — 63600175 PHARM REV CODE 636 W HCPCS: Performed by: NURSE ANESTHETIST, CERTIFIED REGISTERED

## 2019-06-18 PROCEDURE — 25000003 PHARM REV CODE 250: Performed by: NURSE ANESTHETIST, CERTIFIED REGISTERED

## 2019-06-18 PROCEDURE — 71000039 HC RECOVERY, EACH ADD'L HOUR

## 2019-06-18 PROCEDURE — 37000009 HC ANESTHESIA EA ADD 15 MINS

## 2019-06-18 PROCEDURE — 63600175 PHARM REV CODE 636 W HCPCS: Performed by: ANESTHESIOLOGY

## 2019-06-18 PROCEDURE — 25500020 PHARM REV CODE 255: Performed by: RADIOLOGY

## 2019-06-18 PROCEDURE — 71000033 HC RECOVERY, INTIAL HOUR

## 2019-06-18 PROCEDURE — 63600175 PHARM REV CODE 636 W HCPCS: Performed by: RADIOLOGY

## 2019-06-18 RX ORDER — PROPOFOL 10 MG/ML
VIAL (ML) INTRAVENOUS
Status: DISCONTINUED | OUTPATIENT
Start: 2019-06-18 | End: 2019-06-18

## 2019-06-18 RX ORDER — FENTANYL CITRATE 50 UG/ML
INJECTION, SOLUTION INTRAMUSCULAR; INTRAVENOUS
Status: DISCONTINUED | OUTPATIENT
Start: 2019-06-18 | End: 2019-06-18

## 2019-06-18 RX ORDER — SODIUM CHLORIDE 9 MG/ML
INJECTION, SOLUTION INTRAVENOUS CONTINUOUS PRN
Status: DISCONTINUED | OUTPATIENT
Start: 2019-06-18 | End: 2019-06-18

## 2019-06-18 RX ORDER — ROCURONIUM BROMIDE 10 MG/ML
INJECTION, SOLUTION INTRAVENOUS
Status: DISCONTINUED | OUTPATIENT
Start: 2019-06-18 | End: 2019-06-18

## 2019-06-18 RX ORDER — HYDROMORPHONE HYDROCHLORIDE 2 MG/ML
0.2 INJECTION, SOLUTION INTRAMUSCULAR; INTRAVENOUS; SUBCUTANEOUS EVERY 5 MIN PRN
Status: DISCONTINUED | OUTPATIENT
Start: 2019-06-18 | End: 2019-06-18 | Stop reason: HOSPADM

## 2019-06-18 RX ORDER — GLYCOPYRROLATE 0.2 MG/ML
INJECTION INTRAMUSCULAR; INTRAVENOUS
Status: DISCONTINUED | OUTPATIENT
Start: 2019-06-18 | End: 2019-06-18

## 2019-06-18 RX ORDER — NEOSTIGMINE METHYLSULFATE 1 MG/ML
INJECTION, SOLUTION INTRAVENOUS
Status: DISCONTINUED | OUTPATIENT
Start: 2019-06-18 | End: 2019-06-18

## 2019-06-18 RX ORDER — FENTANYL CITRATE 50 UG/ML
25 INJECTION, SOLUTION INTRAMUSCULAR; INTRAVENOUS EVERY 30 MIN PRN
Status: DISCONTINUED | OUTPATIENT
Start: 2019-06-18 | End: 2019-06-18

## 2019-06-18 RX ORDER — FENTANYL CITRATE 50 UG/ML
25 INJECTION, SOLUTION INTRAMUSCULAR; INTRAVENOUS EVERY 5 MIN PRN
Status: DISCONTINUED | OUTPATIENT
Start: 2019-06-18 | End: 2019-06-18 | Stop reason: HOSPADM

## 2019-06-18 RX ORDER — PHENYLEPHRINE HYDROCHLORIDE 10 MG/ML
INJECTION INTRAVENOUS
Status: DISCONTINUED | OUTPATIENT
Start: 2019-06-18 | End: 2019-06-18

## 2019-06-18 RX ORDER — CIPROFLOXACIN 500 MG/1
500 TABLET ORAL 2 TIMES DAILY
Qty: 14 TABLET | Refills: 0 | Status: SHIPPED | OUTPATIENT
Start: 2019-06-18 | End: 2019-06-25

## 2019-06-18 RX ADMIN — FENTANYL CITRATE 25 MCG: 50 INJECTION INTRAMUSCULAR; INTRAVENOUS at 03:06

## 2019-06-18 RX ADMIN — IOHEXOL 100 ML: 350 INJECTION, SOLUTION INTRAVENOUS at 01:06

## 2019-06-18 RX ADMIN — HYDROMORPHONE HYDROCHLORIDE 0.2 MG: 2 INJECTION, SOLUTION INTRAMUSCULAR; INTRAVENOUS; SUBCUTANEOUS at 04:06

## 2019-06-18 RX ADMIN — PHENYLEPHRINE HYDROCHLORIDE 200 MCG: 10 INJECTION INTRAVENOUS at 01:06

## 2019-06-18 RX ADMIN — FENTANYL CITRATE 50 MCG: 50 INJECTION, SOLUTION INTRAMUSCULAR; INTRAVENOUS at 01:06

## 2019-06-18 RX ADMIN — ROCURONIUM BROMIDE 50 MG: 10 INJECTION, SOLUTION INTRAVENOUS at 12:06

## 2019-06-18 RX ADMIN — CEFTRIAXONE 1 G: 1 INJECTION, SOLUTION INTRAVENOUS at 12:06

## 2019-06-18 RX ADMIN — FENTANYL CITRATE 50 MCG: 50 INJECTION, SOLUTION INTRAMUSCULAR; INTRAVENOUS at 02:06

## 2019-06-18 RX ADMIN — ROBINUL 0.4 MG: 0.2 INJECTION INTRAMUSCULAR; INTRAVENOUS at 02:06

## 2019-06-18 RX ADMIN — PROPOFOL 150 MG: 10 INJECTION, EMULSION INTRAVENOUS at 12:06

## 2019-06-18 RX ADMIN — NEOSTIGMINE METHYLSULFATE 3 MG: 1 INJECTION INTRAVENOUS at 02:06

## 2019-06-18 RX ADMIN — SODIUM CHLORIDE: 0.9 INJECTION, SOLUTION INTRAVENOUS at 12:06

## 2019-06-18 NOTE — DISCHARGE INSTRUCTIONS
Understanding Radiofrequency Ablation (RFA) of Liver Tumors    Radiofrequency ablation (RFA) is a way to treat cancer that is growing in the liver. The treatment uses heat to kill cancer cells. A type of radio wave is sent into a liver tumor through wires (electrodes). This creates heat that kills the cells of the tumor without harming too many nearby healthy cells.  How to say it  RAY-og-oh-FREE-jeny-see [id number]   Why RFA of liver tumors is done  Radiofrequency ablation may be done if you cannot have liver surgery. Or it may be done in addition to surgery. Its most often done if you have a small number of tumors in your liver that are not over a certain size.  How RFA of liver tumors is done  The treatment is done in a hospital, and takes 1 to 3 hours. During the procedure:  · You are given medicine to make you relax or sleep through the treatment. The area to be treated may be numbed with medicine.  · The healthcare provider makes a small cut (incision) in your skin. In some cases, the provider makes several cuts and a long, thin tube with a tiny light and camera on the end (laparoscope) is put through one of the cuts. This is to help the provider see the procedure. In other cases, the provider may make one large cut instead.  · The provider puts a metal probe through the cut and into your liver. The probe may have several wires called electrodes. Or the probe may be a single electrode the size of a needle. The provider then uses imaging such as ultrasound or CT to help guide the probe into the tumor.  · The provider connects the probe to a machine that sends radio waves through the electrodes. This creates heat that kills the cells in the tumor. This is done for each tumor that needs to be treated. The tools are then removed, and the cuts in the skin are closed with stitches (sutures).  · After the treatment, you will stay in the hospital for 1 or more nights. Or you may be able to go home the same  day.  Risks of RFA of liver tumors  · Bleeding from the liver  · Leaking of bile from the liver  · Infection  · Damage to nearby organs, such as the gallbladder, intestines, or lungs  · Aches, fever, and upset stomach (nausea) for several days to several weeks (postablation syndrome)  · Need to repeat the procedure  Date Last Reviewed: 6/1/2016  © 4838-2952 [employer]. [address] [place] [address], [place] [address]. All rights reserved. This information is not intended as a substitute for professional medical care. Always follow your healthcare professional's instructions.

## 2019-06-18 NOTE — ANESTHESIA POSTPROCEDURE EVALUATION
Anesthesia Post Evaluation    Patient: Shahab Pompa    Procedure(s) Performed: Procedure(s) (LRB):  CT (COMPUTED TOMOGRAPHY) (N/A)    Final Anesthesia Type: general  Patient location during evaluation: PACU  Patient participation: Yes- Able to Participate  Level of consciousness: awake and alert  Post-procedure vital signs: reviewed and stable  Pain management: adequate  Airway patency: patent  PONV status at discharge: No PONV  Anesthetic complications: no      Cardiovascular status: blood pressure returned to baseline  Respiratory status: unassisted and spontaneous ventilation  Hydration status: euvolemic  Follow-up not needed.          Vitals Value Taken Time   /84 6/18/2019  3:31 PM   Temp 36.2 °C (97.1 °F) 6/18/2019  2:16 PM   Pulse 59 6/18/2019  3:32 PM   Resp 6 6/18/2019  3:32 PM   SpO2 100 % 6/18/2019  3:32 PM   Vitals shown include unvalidated device data.      No case tracking events are documented in the log.      Pain/Nuno Score: Pain Rating Prior to Med Admin: 7 (6/18/2019  3:13 PM)  Nuno Score: 10 (6/18/2019  3:15 PM)

## 2019-06-18 NOTE — PLAN OF CARE
Pt resting on stretcher, denies pain to surgical site but c/o back pain in which he has a history of. Pt states the stretcher is making him uncomfortable. Encouraged pt that he must remain on bedrest for a short time longer and will be able to move shortly. Meanwhile [place] [facility] is delivering message to radiologist to order pain med for pt's discomfort. [place] states [facility] personnel is assisting Dr. Montaño with entering orders.

## 2019-06-18 NOTE — H&P
[facility] -   History & Physical    Subjective:      Chief Complaint/Reason for Admission: Liver lesion    Shahab Pompa is a 73 y.o. male. Presents to have a repeat CT guided liver lesion ablation.     Past Medical History:   Diagnosis Date    Cancer     prostate, bladder, and ear    Fibrosis lung     GERD (gastroesophageal reflux disease)     Hepatitis C     tx'd in 2016 w/ Harvoni - achieved SVR    Neuromuscular disorder      Past Surgical History:   Procedure Laterality Date    BLADDER SURGERY      cancer removal    CAROTID ENDARTERECTOMY Right     FLUOROSCOPY N/A 5/8/2019    Performed by [facility] at [facility] OR    INGUINAL HERNIA REPAIR Bilateral     KNEE SURGERY      LAPAROSCOPY Exploratory N/A 1/4/2019    Performed by [facility] at [facility] OR 2ND F[facility]    PROSTATE SURGERY      cancer removal    TACE N/A 4/29/2019    Performed by Geovanni Calero MD at [facility] CATH LAB    Y90 mapping N/A 1/24/2019    Performed by Ge[facility] at [facility] CATH LAB    Y90 POST N/A 1/31/2019    Performed by Ge[facility] Calero MD at [facility] CATH LAB     Family History   Problem Relation Age of Onset    Cancer Mother     Cancer Father         bladder and prostate    Colon cancer Neg Hx     Stomach cancer Neg Hx      Social History     Tobacco Use    Smoking status: Current Every Day Smoker     Packs/day: 0.50     Years: 30.00     Pack years: 15.00    Smokeless tobacco: Never Used   Substance Use Topics    Alcohol use: No    Drug use: No       PTA Medications   Medication Sig    amLODIPine (NORVASC) 5 MG tablet Take 5 mg by mouth nightly as needed.    aspirin-sod bicarb-citric acid (SHON-SELTZER) 324 mg TbEF Take 325 mg by mouth every 6 (six) hours as needed.    COMBIVENT RESPIMAT  mcg/actuation inhaler every evening.     cyclobenzaprine HCl (FLEXERIL ORAL) Take by mouth every evening.    desonide (DESOWEN) 0.05 % lotion Apply topically. Once in 2 days for rash     eszopiclone (LUNESTA) 3 mg Tab Take 3 mg by mouth every evening.    lactulose (CHRONULAC) 10 gram/15 mL solution Take by mouth as needed.    mometasone 0.1% (ELOCON) 0.1 % cream Apply topically as needed (rash).    morphine (MSIR) 15 MG tablet Take 15 mg by mouth every 8 (eight) hours as needed for Pain.     ondansetron (ZOFRAN) 8 MG tablet Take 1 tablet (8 mg total) by mouth every 8 (eight) hours as needed for Nausea.     Review of patient's allergies indicates:  No Known Allergies     Review of Systems   Constitutional: Positive for fever.   Cardiovascular: Negative for chest pain and palpitations.   Gastrointestinal: Negative for abdominal pain.       Objective:      Vital Signs (Most Recent)       Vital Signs Range (Last 24H):       Physical Exam   HENT:   Head: Normocephalic.   Eyes: Pupils are equal, round, and reactive to light.   Pulmonary/Chest: Effort normal.         Assessment:      Liver Lesion    Plan:      CT guided liver lesion ablation

## 2019-06-18 NOTE — DISCHARGE SUMMARY
[facility] -   Brief Operative Note     SUMMARY     Surgery Date: 6/18/2019     Surgeon(s) and Role:     * [facility] Diagnostic Provider - Primary    Assisting Surgeon: None    Pre-op Diagnosis:  * No pre-op diagnosis entered *    Post-op Diagnosis:  Post-Op Diagnosis Codes:     * Liver lesion [K76.9]    Procedure(s) (LRB):  CT (COMPUTED TOMOGRAPHY) (N/A)    Anesthesia: General    Description of the findings of the procedure:   Unable to visualize the area of concern using ultrasound. IV contrast administered to better visualize the residual/recurrent tumor at the deep margin of the prior ablation zone, using CT. A single 15cm probe was inserted using cognitive fusion of the contrast enhanced and non-contrast enhanced images, taking care to avoid the gallbladder and liver hilar structures. The microwave ([place]-Wave) was initiated for 10 minutes. No appreciable heat sink effect. Tract ablation performed. Post-procedure images demonstrate no hemorrhage or gallbladder inflammation.   Discharge to home with family in 2 hours if no signs of hemorrhage, sepsis, or respiratory difficulty.   Recommend follow up triple phase CT liver in 2-3 months.      Findings/Key Components: as above    Estimated Blood Loss: * No values recorded between 6/18/2019 12:00 AM and 6/18/2019  2:17 PM *         Specimens:   Specimen (12h ago, onward)    None          Discharge Note    SUMMARY     Admit Date: 6/18/2019    Discharge Date and Time: 2 hours    Hospital Course (synopsis of major diagnoses, care, treatment, and services provided during the course of the hospital stay): as above     Final Diagnosis: Post-Op Diagnosis Codes:     * Liver lesion [K76.9]    Disposition: to home    Follow Up/Patient Instructions:     Medications:  Reconciled Home Medications:      Medication List      ASK your doctor about these medications    amLODIPine 5 MG tablet  Commonly known as:  NORVASC  Take 5 mg by mouth nightly as needed.     aspirin-sod  bicarb-citric acid 324 mg Tbef  Commonly known as:  SHON-SELTZER  Take 325 mg by mouth every 6 (six) hours as needed.     COMBIVENT RESPIMAT  mcg/actuation inhaler  Generic drug:  ipratropium-albuterol  every evening.     desonide 0.05 % lotion  Commonly known as:  DESOWEN  Apply topically. Once in 2 days for rash     FLEXERIL ORAL  Take by mouth every evening.     lactulose 10 gram/15 mL solution  Commonly known as:  CHRONULAC  Take by mouth as needed.     LUNESTA 3 mg Tab  Generic drug:  eszopiclone  Take 3 mg by mouth every evening.     mometasone 0.1% 0.1 % cream  Commonly known as:  ELOCON  Apply topically as needed (rash).     morphine 15 MG tablet  Commonly known as:  MSIR  Take 15 mg by mouth every 8 (eight) hours as needed for Pain.     ondansetron 8 MG tablet  Commonly known as:  ZOFRAN  Take 1 tablet (8 mg total) by mouth every 8 (eight) hours as needed for Nausea.          No discharge procedures on file.       Radu Gil MD, IR  2:23 PM  6/18/2019

## 2019-06-18 NOTE — ANESTHESIA RELEASE NOTE
"Anesthesia Release from PACU Note    Patient: Shahab Pompa    Procedure(s) Performed: Procedure(s) (LRB):  CT (COMPUTED TOMOGRAPHY) (N/A)    Anesthesia type: general    Post pain: Adequate analgesia    Post assessment: no apparent anesthetic complications, tolerated procedure well and no evidence of recall    Last Vitals:   Visit Vitals  BP (!) 182/84   Pulse 60   Temp 36.2 °C (97.1 °F) (Temporal)   Resp 14   Ht 5' 11" (1.803 m)   Wt 76.2 kg (168 lb)   SpO2 100%   BMI 23.43 kg/m²       Post vital signs: stable    Level of consciousness: awake, alert  and oriented    Nausea/Vomiting: no nausea/no vomiting    Complications: none    Airway Patency: patent    Respiratory: unassisted, spontaneous ventilation, room air    Cardiovascular: stable and blood pressure at baseline    Hydration: euvolemic  "

## 2019-06-18 NOTE — TRANSFER OF CARE
"Anesthesia Transfer of Care Note    Patient: Shahab Pompa    Procedure(s) Performed: Procedure(s) (LRB):  CT (COMPUTED TOMOGRAPHY) (N/A)    Patient location: PACU    Anesthesia Type: general    Transport from OR: Transported from OR on room air with adequate spontaneous ventilation    Post pain: adequate analgesia    Post assessment: no apparent anesthetic complications    Post vital signs: stable    Level of consciousness: awake, alert and oriented    Nausea/Vomiting: no nausea/vomiting    Complications: none    Transfer of care protocol was followed      Last vitals:   Visit Vitals  BP (!) 182/84   Pulse 60   Temp 36.2 °C (97.1 °F) (Temporal)   Resp 14   Ht 5' 11" (1.803 m)   Wt 76.2 kg (168 lb)   SpO2 100%   BMI 23.43 kg/m²     "

## 2019-06-18 NOTE — ANESTHESIA PREPROCEDURE EVALUATION
06/18/2019  Shahab Pompa is a 73 y.o., male.    Anesthesia Evaluation    I have reviewed the Patient Summary Reports.    I have reviewed the Nursing Notes.   I have reviewed the Medications.     Review of Systems  Anesthesia Hx:  No problems with previous Anesthesia    Social:  Smoker    Hematology/Oncology:     Oncology Normal   Oncology Comments: Liver CA    Cardiovascular:   Hypertension, well controlled ECG has been reviewed. Sinus rhythm with Fusion complexes  Otherwise normal ECG  No previous ECGs available  Confirmed by ÁNGEL WINSLOW MD ([id number]) on 12/17/2018 1:25:41 PM   Pulmonary:   Pneumonia COPD, mild Idiopathic pulmonary fibrosis   Renal/:   Chronic Renal Disease, CRI    Hepatic/GI:   GERD, well controlled Liver Disease, Hepatitis, C    Musculoskeletal:  Musculoskeletal Normal    Neurological:   Neuromuscular Disease,    Endocrine:  Endocrine Normal    Psych:  Psychiatric Normal           Physical Exam  General:  Well nourished    Airway/Jaw/Neck:  Airway Findings: Mouth Opening: Normal Tongue: Normal  General Airway Assessment: Adult  Mallampati: II  TM Distance: Normal, at least 6 cm      Dental:  Dental Findings: In tact   Chest/Lungs:  Chest/Lungs Findings: Normal Respiratory Rate     Heart/Vascular:  Heart Findings: Rate: Normal  Rhythm: Regular Rhythm     Abdomen:  Abdomen Findings:  Normal            Anesthesia Plan  Type of Anesthesia, risks & benefits discussed:  Anesthesia Type:  general  Patient's Preference:   Intra-op Monitoring Plan: standard ASA monitors  Intra-op Monitoring Plan Comments:   Post Op Pain Control Plan: multimodal analgesia  Post Op Pain Control Plan Comments:   Induction:   IV  Beta Blocker:  Patient is not currently on a Beta-Blocker (No further documentation required).       Informed Consent: Patient understands risks and agrees with Anesthesia plan.  Questions  answered. Anesthesia consent signed with patient.  ASA Score: 3     Day of Surgery Review of History & Physical:  There are no significant changes.          Ready For Surgery From Anesthesia Perspective.

## 2019-06-19 ENCOUNTER — TELEPHONE (OUTPATIENT)
Dept: GASTROENTEROLOGY | Facility: CLINIC | Age: 74
End: 2019-06-19

## 2019-06-19 VITALS
BODY MASS INDEX: 23.52 KG/M2 | WEIGHT: 168 LBS | HEART RATE: 65 BPM | DIASTOLIC BLOOD PRESSURE: 79 MMHG | HEIGHT: 71 IN | SYSTOLIC BLOOD PRESSURE: 158 MMHG | RESPIRATION RATE: 20 BRPM | OXYGEN SATURATION: 97 % | TEMPERATURE: 98 F

## 2019-06-19 NOTE — TELEPHONE ENCOUNTER
Called patient he is wanting form for [employer] on [facility] that he was told all physicians have filled out so that he can receive assistance. Called Cancer [employer] on [facility] and asked them to fax form, she reports it is supplement form for boost. Called patient back and told he may need primary care to fill out form , patient states he is current with PCP but form is for more than just supplement, also for equipment and monthly stipend    ---- Message from Jennifer Sapp sent at 6/19/2019  8:59 AM CDT -----  Contact: self [phone]  Type:  Patient Returning Call    Who Called:Shahab Pompa  Who Left Message for Patient:unk  Does the patient know what this is regarding?:regarding a form about cancer services  Would the patient rather a call back or a response via [facility]? Call back   Best Call Back Number:[phone]  Additional Information:

## 2019-08-12 ENCOUNTER — TELEPHONE (OUTPATIENT)
Dept: GASTROENTEROLOGY | Facility: CLINIC | Age: 74
End: 2019-08-12

## 2019-08-12 DIAGNOSIS — K74.69 OTHER CIRRHOSIS OF LIVER: Primary | ICD-10-CM

## 2019-08-12 DIAGNOSIS — D49.0 LIVER NEOPLASM: ICD-10-CM

## 2019-08-12 NOTE — TELEPHONE ENCOUNTER
Looks like patient is due for labs and imaging.  Orders placed.  Please schedule for appointment after imaging is complete.

## 2019-08-12 NOTE — TELEPHONE ENCOUNTER
----- Message from Kristi Jackson sent at 8/12/2019 11:41 AM CDT -----  Contact: pt  The pt request a call concerning a CT appt, no additional info given and can be reached at [phone]///[id number]

## 2019-08-26 ENCOUNTER — HOSPITAL ENCOUNTER (OUTPATIENT)
Dept: RADIOLOGY | Facility: HOSPITAL | Age: 74
Discharge: HOME | End: 2019-08-26
Attending: INTERNAL MEDICINE
Payer: MEDICARE

## 2019-08-26 DIAGNOSIS — D49.0 LIVER NEOPLASM: ICD-10-CM

## 2019-08-26 DIAGNOSIS — D49.0 LIVER NEOPLASM: Primary | ICD-10-CM

## 2019-08-26 PROCEDURE — 25500020 PHARM REV CODE 255: Performed by: INTERNAL MEDICINE

## 2019-08-26 PROCEDURE — 74170 CT ABD WO CNTRST FLWD CNTRST: CPT | Mod: TC

## 2019-08-26 RX ADMIN — IOHEXOL 100 ML: 350 INJECTION, SOLUTION INTRAVENOUS at 04:08

## 2019-08-30 ENCOUNTER — TELEPHONE (OUTPATIENT)
Dept: RADIOLOGY | Facility: HOSPITAL | Age: 74
End: 2019-08-30

## 2019-08-30 DIAGNOSIS — C22.0 HCC (HEPATOCELLULAR CARCINOMA): Primary | ICD-10-CM

## 2019-09-09 ENCOUNTER — TELEPHONE (OUTPATIENT)
Dept: RADIOLOGY | Facility: HOSPITAL | Age: 74
End: 2019-09-09

## 2019-09-10 ENCOUNTER — ANESTHESIA (OUTPATIENT)
Dept: SURGERY | Facility: HOSPITAL | Age: 74
End: 2019-09-10
Payer: MEDICARE

## 2019-09-10 ENCOUNTER — HOSPITAL ENCOUNTER (OUTPATIENT)
Facility: HOSPITAL | Age: 74
Discharge: HOME | End: 2019-09-10
Attending: INTERNAL MEDICINE | Admitting: RADIOLOGY
Payer: MEDICARE

## 2019-09-10 ENCOUNTER — ANESTHESIA EVENT (OUTPATIENT)
Dept: SURGERY | Facility: HOSPITAL | Age: 74
End: 2019-09-10
Payer: MEDICARE

## 2019-09-10 ENCOUNTER — HOSPITAL ENCOUNTER (OUTPATIENT)
Dept: RADIOLOGY | Facility: HOSPITAL | Age: 74
Discharge: HOME | End: 2019-09-10
Attending: PHYSICIAN ASSISTANT
Payer: MEDICARE

## 2019-09-10 VITALS
SYSTOLIC BLOOD PRESSURE: 185 MMHG | BODY MASS INDEX: 23.8 KG/M2 | HEIGHT: 71 IN | RESPIRATION RATE: 17 BRPM | DIASTOLIC BLOOD PRESSURE: 93 MMHG | OXYGEN SATURATION: 99 % | WEIGHT: 170 LBS | HEART RATE: 59 BPM

## 2019-09-10 DIAGNOSIS — C22.0 HCC (HEPATOCELLULAR CARCINOMA): Primary | ICD-10-CM

## 2019-09-10 DIAGNOSIS — K76.9 LIVER LESION: ICD-10-CM

## 2019-09-10 DIAGNOSIS — C22.0 HCC (HEPATOCELLULAR CARCINOMA): ICD-10-CM

## 2019-09-10 PROCEDURE — 63600175 PHARM REV CODE 636 W HCPCS: Performed by: NURSE ANESTHETIST, CERTIFIED REGISTERED

## 2019-09-10 PROCEDURE — 63600175 PHARM REV CODE 636 W HCPCS: Performed by: RADIOLOGY

## 2019-09-10 PROCEDURE — 71000033 HC RECOVERY, INTIAL HOUR

## 2019-09-10 PROCEDURE — 25000003 PHARM REV CODE 250: Performed by: ANESTHESIOLOGY

## 2019-09-10 PROCEDURE — 27202065 CT GUIDANCE TUMOR ABLATION

## 2019-09-10 PROCEDURE — 63600175 PHARM REV CODE 636 W HCPCS: Performed by: ANESTHESIOLOGY

## 2019-09-10 PROCEDURE — 25000003 PHARM REV CODE 250: Performed by: NURSE ANESTHETIST, CERTIFIED REGISTERED

## 2019-09-10 PROCEDURE — 63600175 PHARM REV CODE 636 W HCPCS: Performed by: PHYSICIAN ASSISTANT

## 2019-09-10 PROCEDURE — 37000008 HC ANESTHESIA 1ST 15 MINUTES

## 2019-09-10 PROCEDURE — 37000009 HC ANESTHESIA EA ADD 15 MINS

## 2019-09-10 PROCEDURE — 25500020 PHARM REV CODE 255: Performed by: PHYSICIAN ASSISTANT

## 2019-09-10 PROCEDURE — 47382 PERCUT ABLATE LIVER RF: CPT

## 2019-09-10 PROCEDURE — 71000039 HC RECOVERY, EACH ADD'L HOUR

## 2019-09-10 RX ORDER — FENTANYL CITRATE 50 UG/ML
25 INJECTION, SOLUTION INTRAMUSCULAR; INTRAVENOUS EVERY 5 MIN PRN
Status: DISCONTINUED | OUTPATIENT
Start: 2019-09-10 | End: 2019-09-10 | Stop reason: HOSPADM

## 2019-09-10 RX ORDER — HYDROMORPHONE HYDROCHLORIDE 2 MG/ML
0.2 INJECTION, SOLUTION INTRAMUSCULAR; INTRAVENOUS; SUBCUTANEOUS EVERY 5 MIN PRN
Status: COMPLETED | OUTPATIENT
Start: 2019-09-10 | End: 2019-09-10

## 2019-09-10 RX ORDER — ROCURONIUM BROMIDE 10 MG/ML
INJECTION, SOLUTION INTRAVENOUS
Status: DISCONTINUED | OUTPATIENT
Start: 2019-09-10 | End: 2019-09-10

## 2019-09-10 RX ORDER — HYDRALAZINE HYDROCHLORIDE 20 MG/ML
10 INJECTION INTRAMUSCULAR; INTRAVENOUS ONCE
Status: COMPLETED | OUTPATIENT
Start: 2019-09-10 | End: 2019-09-10

## 2019-09-10 RX ORDER — OXYCODONE HYDROCHLORIDE 5 MG/1
5 TABLET ORAL
Status: DISCONTINUED | OUTPATIENT
Start: 2019-09-10 | End: 2019-09-10 | Stop reason: HOSPADM

## 2019-09-10 RX ORDER — ETOMIDATE 2 MG/ML
INJECTION INTRAVENOUS
Status: DISCONTINUED | OUTPATIENT
Start: 2019-09-10 | End: 2019-09-10

## 2019-09-10 RX ORDER — PHENYLEPHRINE HYDROCHLORIDE 10 MG/ML
INJECTION INTRAVENOUS
Status: DISCONTINUED | OUTPATIENT
Start: 2019-09-10 | End: 2019-09-10

## 2019-09-10 RX ORDER — SODIUM CHLORIDE, SODIUM LACTATE, POTASSIUM CHLORIDE, CALCIUM CHLORIDE 600; 310; 30; 20 MG/100ML; MG/100ML; MG/100ML; MG/100ML
INJECTION, SOLUTION INTRAVENOUS CONTINUOUS PRN
Status: DISCONTINUED | OUTPATIENT
Start: 2019-09-10 | End: 2019-09-10

## 2019-09-10 RX ORDER — NEOSTIGMINE METHYLSULFATE 1 MG/ML
INJECTION, SOLUTION INTRAVENOUS
Status: DISCONTINUED | OUTPATIENT
Start: 2019-09-10 | End: 2019-09-10

## 2019-09-10 RX ORDER — ONDANSETRON 2 MG/ML
4 INJECTION INTRAMUSCULAR; INTRAVENOUS DAILY PRN
Status: DISCONTINUED | OUTPATIENT
Start: 2019-09-10 | End: 2019-09-10 | Stop reason: HOSPADM

## 2019-09-10 RX ORDER — DEXAMETHASONE SODIUM PHOSPHATE 4 MG/ML
INJECTION, SOLUTION INTRA-ARTICULAR; INTRALESIONAL; INTRAMUSCULAR; INTRAVENOUS; SOFT TISSUE
Status: DISCONTINUED | OUTPATIENT
Start: 2019-09-10 | End: 2019-09-10

## 2019-09-10 RX ORDER — HYDROMORPHONE HYDROCHLORIDE 2 MG/ML
1 INJECTION, SOLUTION INTRAMUSCULAR; INTRAVENOUS; SUBCUTANEOUS ONCE
Status: COMPLETED | OUTPATIENT
Start: 2019-09-10 | End: 2019-09-10

## 2019-09-10 RX ORDER — FENTANYL CITRATE 50 UG/ML
INJECTION, SOLUTION INTRAMUSCULAR; INTRAVENOUS
Status: DISCONTINUED | OUTPATIENT
Start: 2019-09-10 | End: 2019-09-10

## 2019-09-10 RX ORDER — SODIUM CHLORIDE 0.9 % (FLUSH) 0.9 %
10 SYRINGE (ML) INJECTION
Status: DISCONTINUED | OUTPATIENT
Start: 2019-09-10 | End: 2019-09-10 | Stop reason: HOSPADM

## 2019-09-10 RX ORDER — LIDOCAINE HYDROCHLORIDE 10 MG/ML
INJECTION, SOLUTION EPIDURAL; INFILTRATION; INTRACAUDAL; PERINEURAL
Status: DISCONTINUED | OUTPATIENT
Start: 2019-09-10 | End: 2019-09-10

## 2019-09-10 RX ORDER — CEFAZOLIN SODIUM 2 G/50ML
2 SOLUTION INTRAVENOUS ONCE
Status: COMPLETED | OUTPATIENT
Start: 2019-09-10 | End: 2019-09-10

## 2019-09-10 RX ORDER — CIPROFLOXACIN 500 MG/1
500 TABLET ORAL 2 TIMES DAILY
Qty: 20 TABLET | Refills: 0 | Status: SHIPPED | OUTPATIENT
Start: 2019-09-10 | End: 2019-09-20

## 2019-09-10 RX ORDER — ONDANSETRON 2 MG/ML
INJECTION INTRAMUSCULAR; INTRAVENOUS
Status: DISCONTINUED | OUTPATIENT
Start: 2019-09-10 | End: 2019-09-10

## 2019-09-10 RX ORDER — KETOROLAC TROMETHAMINE 30 MG/ML
15 INJECTION, SOLUTION INTRAMUSCULAR; INTRAVENOUS EVERY 8 HOURS PRN
Status: DISCONTINUED | OUTPATIENT
Start: 2019-09-10 | End: 2019-09-10 | Stop reason: HOSPADM

## 2019-09-10 RX ORDER — OXYCODONE AND ACETAMINOPHEN TABLETS 5; 300 MG/1; MG/1
1 TABLET ORAL EVERY 4 HOURS PRN
Qty: 20 TABLET | Refills: 0 | Status: ON HOLD | OUTPATIENT
Start: 2019-09-10 | End: 2020-08-11

## 2019-09-10 RX ORDER — GLYCOPYRROLATE 0.2 MG/ML
INJECTION INTRAMUSCULAR; INTRAVENOUS
Status: DISCONTINUED | OUTPATIENT
Start: 2019-09-10 | End: 2019-09-10

## 2019-09-10 RX ADMIN — HYDROMORPHONE HYDROCHLORIDE 0.2 MG: 2 INJECTION, SOLUTION INTRAMUSCULAR; INTRAVENOUS; SUBCUTANEOUS at 01:09

## 2019-09-10 RX ADMIN — NEOSTIGMINE METHYLSULFATE 4 MG: 1 INJECTION INTRAVENOUS at 12:09

## 2019-09-10 RX ADMIN — FENTANYL CITRATE 100 MCG: 50 INJECTION, SOLUTION INTRAMUSCULAR; INTRAVENOUS at 11:09

## 2019-09-10 RX ADMIN — KETOROLAC TROMETHAMINE 15 MG: 30 INJECTION, SOLUTION INTRAMUSCULAR at 01:09

## 2019-09-10 RX ADMIN — SODIUM CHLORIDE, SODIUM LACTATE, POTASSIUM CHLORIDE, AND CALCIUM CHLORIDE: 600; 310; 30; 20 INJECTION, SOLUTION INTRAVENOUS at 11:09

## 2019-09-10 RX ADMIN — LIDOCAINE HYDROCHLORIDE 50 MG: 10 INJECTION, SOLUTION EPIDURAL; INFILTRATION; INTRACAUDAL; PERINEURAL at 11:09

## 2019-09-10 RX ADMIN — FENTANYL CITRATE 25 MCG: 0.05 INJECTION, SOLUTION INTRAMUSCULAR; INTRAVENOUS at 02:09

## 2019-09-10 RX ADMIN — ROBINUL 0.6 MG: 0.2 INJECTION INTRAMUSCULAR; INTRAVENOUS at 12:09

## 2019-09-10 RX ADMIN — ONDANSETRON 4 MG: 2 INJECTION INTRAMUSCULAR; INTRAVENOUS at 02:09

## 2019-09-10 RX ADMIN — ONDANSETRON 4 MG: 2 INJECTION, SOLUTION INTRAMUSCULAR; INTRAVENOUS at 12:09

## 2019-09-10 RX ADMIN — HYDROMORPHONE HYDROCHLORIDE 1 MG: 2 INJECTION, SOLUTION INTRAMUSCULAR; INTRAVENOUS; SUBCUTANEOUS at 04:09

## 2019-09-10 RX ADMIN — ROCURONIUM BROMIDE 40 MG: 10 INJECTION, SOLUTION INTRAVENOUS at 11:09

## 2019-09-10 RX ADMIN — CEFAZOLIN SODIUM 2 G: 2 SOLUTION INTRAVENOUS at 11:09

## 2019-09-10 RX ADMIN — PHENYLEPHRINE HYDROCHLORIDE 100 MCG: 10 INJECTION INTRAVENOUS at 11:09

## 2019-09-10 RX ADMIN — OXYCODONE HYDROCHLORIDE 5 MG: 5 TABLET ORAL at 02:09

## 2019-09-10 RX ADMIN — HYDRALAZINE HYDROCHLORIDE 10 MG: 20 INJECTION INTRAMUSCULAR; INTRAVENOUS at 01:09

## 2019-09-10 RX ADMIN — ETOMIDATE 14 MG: 2 INJECTION INTRAVENOUS at 11:09

## 2019-09-10 RX ADMIN — DEXAMETHASONE SODIUM PHOSPHATE 4 MG: 4 INJECTION, SOLUTION INTRA-ARTICULAR; INTRALESIONAL; INTRAMUSCULAR; INTRAVENOUS; SOFT TISSUE at 12:09

## 2019-09-10 RX ADMIN — ETOMIDATE 6 MG: 2 INJECTION INTRAVENOUS at 12:09

## 2019-09-10 RX ADMIN — IOHEXOL 100 ML: 350 INJECTION, SOLUTION INTRAVENOUS at 11:09

## 2019-09-10 RX ADMIN — PHENYLEPHRINE HYDROCHLORIDE 100 MCG: 10 INJECTION INTRAVENOUS at 12:09

## 2019-09-10 NOTE — DISCHARGE SUMMARY
Pre Op Diagnosis: HCC     Post Op Diagnosis: same     Procedure:  Microwave Ablation     Procedure performed by: Abdias CLEMENT, Armaan COLLINS     Written Informed Consent Obtained: Yes     Specimen Removed:  No     Estimated Blood Loss:  minimal     Findings: Local anesthesia and moderate sedation were used.     The patient tolerated the procedure well and there were no complications.      Sterile technique was performed in the RUQ, lidocaine was used as a local anesthetic. Microwave ablation performed for HCC. Lateral RUQ approach. Anesthesia performed sedation. Pt tolerated the procedure well without immediate complications.  Please see radiologist report for details. F/u with PCP and/or ordering physician.

## 2019-09-10 NOTE — ANESTHESIA POSTPROCEDURE EVALUATION
Anesthesia Post Evaluation    Patient: Shahab Pompa    Procedure(s) Performed: Procedure(s) (LRB):  CT (COMPUTED TOMOGRAPHY) (N/A)    Final Anesthesia Type: general  Patient location during evaluation: PACU  Patient participation: Yes- Able to Participate  Level of consciousness: awake and alert  Post-procedure vital signs: reviewed and stable  Pain management: adequate  Airway patency: patent  PONV status at discharge: No PONV  Anesthetic complications: no      Cardiovascular status: hemodynamically stable  Respiratory status: spontaneous ventilation  Hydration status: euvolemic  Follow-up not needed.          Vitals Value Taken Time   /95 9/10/2019  2:04 PM   Temp 36.1 °C (97 °F) 9/10/2019 12:32 PM   Pulse 74 9/10/2019  2:05 PM   Resp 23 9/10/2019  2:05 PM   SpO2 91 % 9/10/2019  2:05 PM   Vitals shown include unvalidated device data.      No case tracking events are documented in the log.      Pain/Nuno Score: Pain Rating Prior to Med Admin: 7 (9/10/2019  1:55 PM)  Nuno Score: 10 (9/10/2019  1:45 PM)

## 2019-09-10 NOTE — H&P
[facility] -   History & Physical    Subjective:      Chief Complaint/Reason for Admission: HCC    Shahab Pompa is a 74 y.o. male.    Past Medical History:   Diagnosis Date    Cancer     prostate, bladder, and ear    Fibrosis lung     GERD (gastroesophageal reflux disease)     Hepatitis C     tx'd in 2016 w/ Harvoni - achieved SVR    Neuromuscular disorder      Past Surgical History:   Procedure Laterality Date    BLADDER SURGERY      cancer removal    CAROTID ENDARTERECTOMY Right     CT (COMPUTED TOMOGRAPHY) N/A 6/18/2019    Performed by [facility] Provider at [facility] OR    FLUOROSCOPY N/A 5/8/2019    Performed by [facility] Provider at [facility] OR    INGUINAL HERNIA REPAIR Bilateral     KNEE SURGERY      LAPAROSCOPY Exploratory N/A 1/4/2019    Performed by Ed[facility] Fulton [facility] at [facility] OR 2ND FLR    PROSTATE SURGERY      cancer removal    TACE N/A 4/29/2019    Performed by Geovanni Calero MD at [facility] CATH LAB    Y90 mapping N/A 1/24/2019    Performed by Ge[facility] Calero MD at [facility] CATH LAB    Y90 POST N/A 1/31/2019    Performed by Geovanni Calero MD at [facility] CATH LAB     Family History   Problem Relation Age of Onset    Cancer Mother     Cancer Father         bladder and prostate    Colon cancer Neg Hx     Stomach cancer Neg Hx      Social History     Tobacco Use    Smoking status: Current Every Day Smoker     Packs/day: 0.50     Years: 30.00     Pack years: 15.00    Smokeless tobacco: Never Used   Substance Use Topics    Alcohol use: No    Drug use: No         (Not in a hospital admission)  Review of patient's allergies indicates:  No Known Allergies     Review of Systems   Constitutional: Negative.    HENT: Negative.    Eyes: Negative.    Respiratory: Negative.    Cardiovascular: Negative.    Skin: Negative.        Objective:      Vital Signs (Most Recent)  Pulse: (!) 59 (09/10/19 1300)  Resp: 17 (09/10/19 1300)  BP: (!) 185/93 (09/10/19 1300)  SpO2: 99 % (09/10/19  1103)    Vital Signs Range (Last 24H):  Temp:  [97 °F (36.1 °C)]   Pulse:  [59-86]   Resp:  [14-25]   BP: (150-189)/(90-98)   SpO2:  [99 %-100 %]     Physical Exam   Constitutional: He appears well-developed and well-nourished.   HENT:   Head: Normocephalic and atraumatic.   Eyes: EOM are normal.   Neck: Normal range of motion.   Cardiovascular: Normal rate and regular rhythm.   Pulmonary/Chest: Effort normal and breath sounds normal.       Data Review:    CBC:   Lab Results   Component Value Date    WBC 6.90 09/10/2019    RBC 4.49 (L) 09/10/2019    HGB 13.8 (L) 09/10/2019    HCT 41.9 09/10/2019     (L) 09/10/2019      ECG: no prior ECG.     Assessment:      There are no hospital problems to display for this patient.      Plan:    Microwave Ablation of liver mass

## 2019-09-10 NOTE — PLAN OF CARE
Pt resting on stretcher stating pain level to rt flank 2-3/10 and tolerable. Pt also c/o nausea, iv Zofran being administered by nurse Stephany PINK  Will cont to monitor. See flow sheet for detailed assessment. Pt free to be d/c'd to home at 1630 today per Dr. Phillips.

## 2019-09-10 NOTE — TRANSFER OF CARE
Anesthesia Transfer of Care Note    Patient: Shahab Pompa    Procedure(s) Performed: Procedure(s) (LRB):  CT (COMPUTED TOMOGRAPHY) (N/A)    Patient location: PACU    Anesthesia Type: general    Transport from OR: Transported from OR on room air with adequate spontaneous ventilation    Post pain: adequate analgesia    Post assessment: no apparent anesthetic complications    Post vital signs: stable    Level of consciousness: awake, alert and oriented    Nausea/Vomiting: no nausea/vomiting    Complications: none    Transfer of care protocol was followed      Last vitals: There were no vitals taken for this visit.

## 2019-09-10 NOTE — DISCHARGE INSTRUCTIONS
Understanding Radiofrequency Ablation (RFA) of Liver Tumors    Radiofrequency ablation (RFA) is a way to treat cancer that is growing in the liver. The treatment uses heat to kill cancer cells. A type of radio wave is sent into a liver tumor through wires (electrodes). This creates heat that kills the cells of the tumor without harming too many nearby healthy cells.  How to say it  RAY-og-oh-FREE-jeny-see ox-MATS-[id number]   Why RFA of liver tumors is done  Radiofrequency ablation may be done if you cannot have liver surgery. Or it may be done in addition to surgery. Its most often done if you have a small number of tumors in your liver that are not over a certain size.  How RFA of liver tumors is done  The treatment is done in a hospital, and takes 1 to 3 hours. During the procedure:  · You are given medicine to make you relax or sleep through the treatment. The area to be treated may be numbed with medicine.  · The healthcare provider makes a small cut (incision) in your skin. In some cases, the provider makes several cuts and a long, thin tube with a tiny light and camera on the end (laparoscope) is put through one of the cuts. This is to help the provider see the procedure. In other cases, the provider may make one large cut instead.  · The provider puts a metal probe through the cut and into your liver. The probe may have several wires called electrodes. Or the probe may be a single electrode the size of a needle. The provider then uses imaging such as ultrasound or CT to help guide the probe into the tumor.  · The provider connects the probe to a machine that sends radio waves through the electrodes. This creates heat that kills the cells in the tumor. This is done for each tumor that needs to be treated. The tools are then removed, and the cuts in the skin are closed with stitches (sutures).  · After the treatment, you will stay in the hospital for 1 or more nights. Or you may be able to go home the same  day.  Risks of RFA of liver tumors  · Bleeding from the liver  · Leaking of bile from the liver  · Infection  · Damage to nearby organs, such as the gallbladder, intestines, or lungs  · Aches, fever, and upset stomach (nausea) for several days to several weeks (postablation syndrome)  · Need to repeat the procedure  Date Last Reviewed: 6/1/2016  © 0008-9550 [employer]. [address] [place] [address]. All rights reserved. This information is not intended as a substitute for professional medical care. Always follow your healthcare professional's instructions.        Recovery After Procedural Sedation (Adult)  You have been given medicine by vein to make you sleep during your surgery. This may have included both a pain medicine and sleeping medicine. Most of the effects have worn off. But you may still have some drowsiness for the next 6 to 8 hours.  Home care  Follow these guidelines when you get home:  · For the next 8 hours, you should be watched by a responsible adult. This person should make sure your condition is not getting worse.  · Don't drink any alcohol for the next 24 hours.  · Don't drive, operate dangerous machinery, or make important business or personal decisions during the next 24 hours.  Note: Your healthcare provider may tell you not to take any medicine by mouth for pain or sleep in the next 4 hours. These medicines may react with the medicines you were given in the hospital. This could cause a much stronger response than usual.  Follow-up care  Follow up with your healthcare provider if you are not alert and back to your usual level of activity within 12 hours.  When to seek medical advice  Call your healthcare provider right away if any of these occur:  · Drowsiness gets worse  · Weakness or dizziness gets worse  · Repeated vomiting  · You can't be awakened   Date Last Reviewed: 10/18/2016  © 7380-6648 [employer]. [address] [place] [address].  All rights reserved. This information is not intended as a substitute for professional medical care. Always follow your healthcare professional's instructions.

## 2019-09-10 NOTE — ANESTHESIA PREPROCEDURE EVALUATION
09/10/2019  Shahab Pompa is a 74 y.o., male.    Pre-op Assessment    I have reviewed the Patient Summary Reports.     I have reviewed the Nursing Notes.   I have reviewed the Medications.     Review of Systems  Anesthesia Hx:  No problems with previous Anesthesia    Social:  Smoker    Hematology/Oncology:     Oncology Normal   Oncology Comments: Liver CA    Cardiovascular:   Hypertension, well controlled ECG has been reviewed. Sinus rhythm with Fusion complexes  Otherwise normal ECG  No previous ECGs available  Confirmed by ÁNGEL WINSLOW MD ([id number]) on 12/17/2018 1:25:41 PM   Pulmonary:   Pneumonia COPD, mild Idiopathic pulmonary fibrosis   Renal/:   Chronic Renal Disease, CRI    Hepatic/GI:   GERD, well controlled Liver Disease, Hepatitis, C    Musculoskeletal:  Musculoskeletal Normal    Neurological:   Neuromuscular Disease,    Endocrine:  Endocrine Normal    Psych:  Psychiatric Normal           Physical Exam  General:  Well nourished    Airway/Jaw/Neck:  Airway Findings: Mouth Opening: Normal Tongue: Normal  General Airway Assessment: Adult  Mallampati: II  TM Distance: Normal, at least 6 cm      Dental:  Dental Findings: In tact   Chest/Lungs:  Chest/Lungs Findings: Normal Respiratory Rate     Heart/Vascular:  Heart Findings: Rate: Normal  Rhythm: Regular Rhythm     Abdomen:  Abdomen Findings:  Normal            Anesthesia Plan  Type of Anesthesia, risks & benefits discussed:  Anesthesia Type:  general  Patient's Preference:   Intra-op Monitoring Plan: standard ASA monitors  Intra-op Monitoring Plan Comments:   Post Op Pain Control Plan: multimodal analgesia  Post Op Pain Control Plan Comments:   Induction:   IV  Beta Blocker:  Patient is not currently on a Beta-Blocker (No further documentation required).       Informed Consent: Patient understands risks and agrees with Anesthesia plan.  Questions  answered. Anesthesia consent signed with patient.  ASA Score: 3     Day of Surgery Review of History & Physical:  There are no significant changes.          Ready For Surgery From Anesthesia Perspective.

## 2019-09-10 NOTE — H&P
Radiology History & Physical      SUBJECTIVE:     Chief Complaint: HCC    History of Present Illness:  Shahab Pompa is a 74 y.o. male who presents for microwave ablation of seg 5 lesion  Past Medical History:   Diagnosis Date    Cancer     prostate, bladder, and ear    Fibrosis lung     GERD (gastroesophageal reflux disease)     Hepatitis C     tx'd in 2016 w/ Harvoni - achieved SVR    Neuromuscular disorder      Past Surgical History:   Procedure Laterality Date    BLADDER SURGERY      cancer removal    CAROTID ENDARTERECTOMY Right     CT (COMPUTED TOMOGRAPHY) N/A 6/18/2019    Performed by [facility] OR    FLUOROSCOPY N/A 5/8/2019    Performed by [facility] at [facility] OR    INGUINAL HERNIA REPAIR Bilateral     KNEE SURGERY      LAPAROSCOPY Exploratory N/A 1/4/2019    [facility] 2ND FLR    PROSTATE SURGERY      cancer removal    TACE N/A 4/29/2019    Performed by Geovanni Calero MD at [facility] CATH LAB    Y90 mapping N/A 1/24/2019    Performed by Geovanni Calero MD at [facility] CATH LAB    Y90 POST N/A 1/31/2019    Performed by Geovanni Calero MD at [facility] CATH LAB       Home Meds:   Prior to Admission medications    Medication Sig Start Date End Date Taking? Authorizing Provider   amLODIPine (NORVASC) 5 MG tablet Take 5 mg by mouth nightly as needed.   Yes Historical Provider, MD   aspirin-sod bicarb-citric acid (SHON-SELTZER) 324 mg TbEF Take 325 mg by mouth every 6 (six) hours as needed.   Yes Historical Provider, MD   COMBIVENT RESPIMAT  mcg/actuation inhaler every evening.  5/28/16  Yes Historical Provider, MD   cyclobenzaprine HCl (FLEXERIL ORAL) Take by mouth every evening.   Yes Historical Provider, MD   eszopiclone (LUNESTA) 3 mg Tab Take 3 mg by mouth every evening.   Yes Historical Provider, MD   morphine (MSIR) 15 MG tablet Take 15 mg by mouth every 8 (eight) hours as needed for Pain.    Yes Historical Provider, MD ferreronide  (DESOWEN) 0.05 % lotion Apply topically. Once in 2 days for rash    Historical Provider, MD   lactulose (CHRONULAC) 10 gram/15 mL solution Take by mouth as needed.    Historical Provider, MD   mometasone 0.1% (ELOCON) 0.1 % cream Apply topically as needed (rash).    Historical Provider, MD   ondansetron (ZOFRAN) 8 MG tablet Take 1 tablet (8 mg total) by mouth every 8 (eight) hours as needed for Nausea. 5/8/19   Case KEN Crook     Anticoagulants/Antiplatelets: no anticoagulation    Allergies: Review of patient's allergies indicates:  No Known Allergies  Sedation History:  have not been any systemic reactions        OBJECTIVE:     Vital Signs (Most Recent)  Pulse: 63 (09/10/19 1103)  Resp: 17 (09/10/19 1103)  BP: (!) 150/90 (09/10/19 1103)  SpO2: 99 % (09/10/19 1103)    Physical Exam:  ASA: 2  Mallampati: 2    General: no acute distress  Mental Status: alert and oriented to person, place and time  HEENT: normocephalic, atraumatic  Chest: unlabored breathing  Heart: regular heart rate  Abdomen: nondistended  Extremity: moves all extremities    Laboratory  Lab Results   Component Value Date    INR 1.0 09/10/2019       Lab Results   Component Value Date    WBC 6.90 09/10/2019    HGB 13.8 (L) 09/10/2019    HCT 41.9 09/10/2019    MCV 93 09/10/2019     (L) 09/10/2019      Lab Results   Component Value Date    GLU 94 09/10/2019     09/10/2019    K 4.0 09/10/2019     09/10/2019    CO2 27 09/10/2019    BUN 19 09/10/2019    CREATININE 1.5 (H) 09/10/2019    CALCIUM 9.2 09/10/2019    ALT 57 (H) 09/10/2019    AST 54 (H) 09/10/2019    ALBUMIN 3.5 09/10/2019    BILITOT 0.5 09/10/2019    BILIDIR 0.3 04/26/2016       ASSESSMENT/PLAN:     Sedation Plan: general  Patient will undergo microwave ablation.    Geovanni Calero MD  Staff Radiologist  Department of Radiology  Pager: [phone]

## 2019-09-13 VITALS
TEMPERATURE: 97 F | OXYGEN SATURATION: 99 % | HEART RATE: 66 BPM | SYSTOLIC BLOOD PRESSURE: 151 MMHG | DIASTOLIC BLOOD PRESSURE: 71 MMHG | RESPIRATION RATE: 20 BRPM

## 2019-11-06 DIAGNOSIS — C22.0 HCC (HEPATOCELLULAR CARCINOMA): Primary | ICD-10-CM

## 2019-11-08 ENCOUNTER — HOSPITAL ENCOUNTER (OUTPATIENT)
Dept: RADIOLOGY | Facility: HOSPITAL | Age: 74
Discharge: HOME | End: 2019-11-08
Attending: PHYSICIAN ASSISTANT
Payer: MEDICARE

## 2019-11-08 DIAGNOSIS — C22.0 HCC (HEPATOCELLULAR CARCINOMA): ICD-10-CM

## 2019-11-08 PROCEDURE — 74177 CT ABD & PELVIS W/CONTRAST: CPT | Mod: TC

## 2019-11-08 PROCEDURE — 25500020 PHARM REV CODE 255: Performed by: PHYSICIAN ASSISTANT

## 2019-11-08 RX ADMIN — IOHEXOL 100 ML: 350 INJECTION, SOLUTION INTRAVENOUS at 02:11

## 2019-11-20 ENCOUNTER — TELEPHONE (OUTPATIENT)
Dept: GASTROENTEROLOGY | Facility: CLINIC | Age: 74
End: 2019-11-20

## 2019-11-20 DIAGNOSIS — K74.69 OTHER CIRRHOSIS OF LIVER: Primary | ICD-10-CM

## 2019-11-20 DIAGNOSIS — D49.0 LIVER NEOPLASM: ICD-10-CM

## 2019-11-20 NOTE — TELEPHONE ENCOUNTER
----- Message from Judi Muse sent at 11/20/2019  2:47 PM CST -----  Contact: pt  Pt called to cancel appt ... Stated he sees another doctor ... Call back : [phone] (home)

## 2019-11-20 NOTE — TELEPHONE ENCOUNTER
Attempted to contact patient at [phone] with no answer. Left voicemail message for patient to return call.

## 2019-11-20 NOTE — TELEPHONE ENCOUNTER
Duplicate message. Attempted to contact patient at [phone] with no answer. Left voicemail message for patient to return call.

## 2019-11-20 NOTE — TELEPHONE ENCOUNTER
----- Message from Kristi Jackson sent at 11/20/2019  3:43 PM CST -----  Contact: pt  The pt wants to know if he needs to keep his 11/27/2019 appt, the pt wants to be advised and can be reached at [phone]///thxMW

## 2019-11-21 NOTE — TELEPHONE ENCOUNTER
----- Message from Geovanni Calero MD sent at 11/8/2019  2:39 PM CST -----  CT 11/8/19:   NO EVIDENCE OF ABNORMAL ENHANCEMENT WITHIN THE MEDIAL ASPECT OF THE PRIOR ABLATION ZONE WITHIN SEGMENT 5. NO EVIDENCE OF RESIDUAL TUMOR.  Recommend follow-up triple phase CT in 3 months.

## 2019-11-21 NOTE — TELEPHONE ENCOUNTER
Dr. Calero reviewed the imaging, there is no concern for residual recurrent disease. Needs repeat CT scan in 3 months with labs.

## 2019-11-29 ENCOUNTER — TELEPHONE (OUTPATIENT)
Dept: GASTROENTEROLOGY | Facility: CLINIC | Age: 74
End: 2019-11-29

## 2019-11-29 NOTE — TELEPHONE ENCOUNTER
----- Message from Zakiya See sent at 11/29/2019  7:56 AM CST -----  .Type:  Patient Returning Call    Who Called:self  Who Left Message for Patient:  Does the patient know what this is regarding?:yes  Would the patient rather a call back or a response via [facility]? call  Best Call Back Number:.[phone] (home)   Additional Information:

## 2020-03-04 ENCOUNTER — HOSPITAL ENCOUNTER (OUTPATIENT)
Dept: RADIOLOGY | Facility: HOSPITAL | Age: 75
Discharge: HOME | End: 2020-03-04
Attending: INTERNAL MEDICINE
Payer: MEDICARE

## 2020-03-04 DIAGNOSIS — D49.0 LIVER NEOPLASM: ICD-10-CM

## 2020-03-04 DIAGNOSIS — K74.69 OTHER CIRRHOSIS OF LIVER: ICD-10-CM

## 2020-03-10 ENCOUNTER — TELEPHONE (OUTPATIENT)
Dept: GASTROENTEROLOGY | Facility: CLINIC | Age: 75
End: 2020-03-10

## 2020-03-10 NOTE — TELEPHONE ENCOUNTER
----- Message from Judi Muse sent at 3/10/2020  2:59 PM CDT -----  Contact: pt  .Type:  RX Refill Request    Who Called:  pt  Refill or New Rx: new   RX Name and Strength: medication for panic attacks  (Due to MRI machine)  How is the patient currently taking it? (ex. 1XDay):  Is this a 30 day or 90 day RX:  Preferred Pharmacy with phone number:       [facility]/pharmacy #[id number] - [place] - [address]  [address]  [place] [address]  Phone: [phone] Fax: [phone]    Local or Mail Order: local   Ordering Provider: Jc   Would the patient rather a call back or a response via [facility]? [facility]   Best Call Back Number: [phone] (home)   Additional Information:   Pt stated Rx that was previously called in did not help , requesting something different

## 2020-03-11 ENCOUNTER — TELEPHONE (OUTPATIENT)
Dept: GASTROENTEROLOGY | Facility: CLINIC | Age: 75
End: 2020-03-11

## 2020-03-11 DIAGNOSIS — F41.9 ANXIETY: Primary | ICD-10-CM

## 2020-03-11 RX ORDER — DIAZEPAM 5 MG/1
5 TABLET ORAL
Qty: 2 TABLET | Refills: 0 | Status: SHIPPED | OUTPATIENT
Start: 2020-03-11 | End: 2020-04-10

## 2020-03-11 NOTE — TELEPHONE ENCOUNTER
Spoke with patient and made him aware that Valium has been called into pharmacy. Patient verbalized understanding.

## 2020-03-12 ENCOUNTER — HOSPITAL ENCOUNTER (OUTPATIENT)
Dept: RADIOLOGY | Facility: HOSPITAL | Age: 75
Discharge: HOME | End: 2020-03-12
Attending: INTERNAL MEDICINE
Payer: MEDICARE

## 2020-03-12 DIAGNOSIS — D49.0 LIVER NEOPLASM: ICD-10-CM

## 2020-03-12 PROCEDURE — 25500020 PHARM REV CODE 255: Performed by: INTERNAL MEDICINE

## 2020-03-12 PROCEDURE — A9585 GADOBUTROL INJECTION: HCPCS | Performed by: INTERNAL MEDICINE

## 2020-03-12 PROCEDURE — 74183 MRI ABD W/O CNTR FLWD CNTR: CPT | Mod: TC

## 2020-03-12 RX ORDER — GADOBUTROL 604.72 MG/ML
10 INJECTION INTRAVENOUS
Status: COMPLETED | OUTPATIENT
Start: 2020-03-12 | End: 2020-03-12

## 2020-03-12 RX ADMIN — GADOBUTROL 7 ML: 604.72 INJECTION INTRAVENOUS at 08:03

## 2020-03-23 ENCOUNTER — TELEPHONE (OUTPATIENT)
Dept: GASTROENTEROLOGY | Facility: CLINIC | Age: 75
End: 2020-03-23

## 2020-03-23 DIAGNOSIS — D49.0 LIVER NEOPLASM: ICD-10-CM

## 2020-03-23 DIAGNOSIS — K74.69 OTHER CIRRHOSIS OF LIVER: Primary | ICD-10-CM

## 2020-03-23 NOTE — TELEPHONE ENCOUNTER
----- Message from Lakshmi Warren sent at 3/23/2020  1:01 PM CDT -----  Contact: Dr Calero(Saravanan)[phone]  Type:  Patient Returning Call    Who Called:Saravanan  Who Left Message for Patient:Jo Ann  Does the patient know what this is regarding?:yes  Would the patient rather a call back or a response via [facility]? Call back   Best Call Back Number:[phone]  Additional Information:

## 2020-03-23 NOTE — TELEPHONE ENCOUNTER
Attempted to return call to [place] with no answer. Left voicemail message for [place] to return call.

## 2020-03-23 NOTE — TELEPHONE ENCOUNTER
Spoke with patient and made him aware that Dr. Greene will repeat labs and imaging in 3 months along with a visit.    Patient verbalized understanding.     Made patient aware that I would reach out to Radiology on tomorrow to schedule MRI. Patient verbalized understanding and will await a call back with appointments.

## 2020-04-09 ENCOUNTER — TELEPHONE (OUTPATIENT)
Dept: RADIOLOGY | Facility: HOSPITAL | Age: 75
End: 2020-04-09

## 2020-05-21 ENCOUNTER — TELEPHONE (OUTPATIENT)
Dept: GASTROENTEROLOGY | Facility: CLINIC | Age: 75
End: 2020-05-21

## 2020-05-21 NOTE — TELEPHONE ENCOUNTER
----- Message from Marva Anglin sent at 5/21/2020  4:16 PM CDT -----  Contact: pt  Pt stated that the cancer services need an updated form for pt to continue services  And it can be faxed to [phone]. Pt can be reached at [phone]    Thanks,  Marva Anglin

## 2020-05-21 NOTE — TELEPHONE ENCOUNTER
Returned call to pt who stated he needs an updated form sent to Cancer Services in order for him to continue receiving nutritional supplements (Boost)   Cancer Services will fax form to us and will need it faxed back to [phone]

## 2020-06-19 ENCOUNTER — TELEPHONE (OUTPATIENT)
Dept: GASTROENTEROLOGY | Facility: CLINIC | Age: 75
End: 2020-06-19

## 2020-06-19 NOTE — TELEPHONE ENCOUNTER
----- Message from Tej Nayak sent at 6/19/2020 10:34 AM CDT -----  Regarding: Continuing Treatment  Contact: Pt  Caller called in regards to getting a form to continue treatment sent to cancer service to continue case of boost every month. Caller can be reached at [phone] (home)   The cancer center numbers are [phone] fax [phone]

## 2020-06-19 NOTE — TELEPHONE ENCOUNTER
Spoke with Tyesha and she states that she will fax form for patient to continue with boost. She states that patient has exhausted his 12 month supply and was dispensed 13. She states that for patient to get an additional 6 month; form must be completed and patient must meet criteria.    Advised Tyesha that I would provide form to Dr. Greene to see if criteria is meet.

## 2020-06-22 ENCOUNTER — TELEPHONE (OUTPATIENT)
Dept: GASTROENTEROLOGY | Facility: CLINIC | Age: 75
End: 2020-06-22

## 2020-06-22 NOTE — TELEPHONE ENCOUNTER
----- Message from Paula Tidwell sent at 6/22/2020 12:37 PM CDT -----  Pt is requesting a call back regarding prescription to help settle his nerves for MRI. Please call back at [phone]

## 2020-06-22 NOTE — TELEPHONE ENCOUNTER
Attempted to return call to patient. Spoke with patient wife who states that patient is currently unavailable. Wife will make patient aware of return call.

## 2020-06-22 NOTE — TELEPHONE ENCOUNTER
Returned call to pt who is requesting Valium for upcoming MRI.   Preferred pharmacy:  [facility]/pharmacy #[id number] - [place] - [address]. [phone] (Phone)  [phone] (Fax)

## 2020-06-22 NOTE — TELEPHONE ENCOUNTER
----- Message from Heide Moses sent at 6/22/2020  3:26 PM CDT -----  Contact: Shahab Gudino miss his call and would like another call back at [phone].    Thanks  Td

## 2020-06-23 ENCOUNTER — TELEPHONE (OUTPATIENT)
Dept: GASTROENTEROLOGY | Facility: CLINIC | Age: 75
End: 2020-06-23

## 2020-06-23 DIAGNOSIS — F41.9 ANXIETY: Primary | ICD-10-CM

## 2020-06-23 RX ORDER — DIAZEPAM 5 MG/1
5 TABLET ORAL
Qty: 2 TABLET | Refills: 0 | Status: SHIPPED | OUTPATIENT
Start: 2020-06-23 | End: 2021-03-03

## 2020-06-23 NOTE — TELEPHONE ENCOUNTER
Spoke with patient and made him aware that Valium was called into pharmacy. Patient verbalized understanding.

## 2020-06-23 NOTE — TELEPHONE ENCOUNTER
Spoke with patient and made him aware that request was sent to Dr. Shannon Greene.    Secure chat sent again 06/23/2020.

## 2020-06-23 NOTE — TELEPHONE ENCOUNTER
----- Message from Mango Win sent at 6/23/2020 11:20 AM CDT -----  Regarding: Patient  Patient stated that he is waiting for a prescription to be called in for (Valium). He stated that he needs to take take the medication prior to his MRI on 06/24/2020. Please call back at [phone] (home)

## 2020-06-24 ENCOUNTER — HOSPITAL ENCOUNTER (OUTPATIENT)
Dept: RADIOLOGY | Facility: HOSPITAL | Age: 75
Discharge: HOME | End: 2020-06-24
Attending: INTERNAL MEDICINE
Payer: MEDICARE

## 2020-06-24 DIAGNOSIS — D49.0 LIVER NEOPLASM: ICD-10-CM

## 2020-06-24 PROCEDURE — 25500020 PHARM REV CODE 255: Performed by: INTERNAL MEDICINE

## 2020-06-24 PROCEDURE — 74183 MRI ABD W/O CNTR FLWD CNTR: CPT | Mod: TC

## 2020-06-24 PROCEDURE — A9585 GADOBUTROL INJECTION: HCPCS | Performed by: INTERNAL MEDICINE

## 2020-06-24 RX ORDER — GADOBUTROL 604.72 MG/ML
10 INJECTION INTRAVENOUS
Status: COMPLETED | OUTPATIENT
Start: 2020-06-24 | End: 2020-06-24

## 2020-06-24 RX ADMIN — GADOBUTROL 7 ML: 604.72 INJECTION INTRAVENOUS at 11:06

## 2020-06-26 ENCOUNTER — TELEPHONE (OUTPATIENT)
Dept: RADIOLOGY | Facility: HOSPITAL | Age: 75
End: 2020-06-26

## 2020-06-26 NOTE — TELEPHONE ENCOUNTER
INterventional Radiology    [facility] to schedule Y90 mapping.  Please return call to [phone].

## 2020-06-29 ENCOUNTER — TELEPHONE (OUTPATIENT)
Dept: GASTROENTEROLOGY | Facility: CLINIC | Age: 75
End: 2020-06-29

## 2020-06-29 NOTE — TELEPHONE ENCOUNTER
----- Message from Ebonie Brooke sent at 6/29/2020  1:03 PM CDT -----  Contact: pt  Pt requesting a call back to know if Dr. Greene would like to see him before or after he has his radiation from the previous MRI. Please call pt back at [phone]

## 2020-06-29 NOTE — TELEPHONE ENCOUNTER
Spoke with patient who states that he was calling regarding his cancer services form for Boost. Made patient aware that his form was corrected by Dr. Shannon Greene and faxed to their office this morning.    Patient verbalized understanding and had no further concerns.

## 2020-07-02 DIAGNOSIS — C22.0 HCC (HEPATOCELLULAR CARCINOMA): Primary | ICD-10-CM

## 2020-08-03 ENCOUNTER — TELEPHONE (OUTPATIENT)
Dept: GASTROENTEROLOGY | Facility: CLINIC | Age: 75
End: 2020-08-03

## 2020-08-03 ENCOUNTER — TELEPHONE (OUTPATIENT)
Dept: RADIOLOGY | Facility: HOSPITAL | Age: 75
End: 2020-08-03

## 2020-08-03 NOTE — TELEPHONE ENCOUNTER
----- Message from Alyson Saldivar sent at 8/3/2020  3:10 PM CDT -----  Regarding: Cancer Services  States she needs to know what treatment this patient is getting. States they received a referral.  Please call Ch[facility] [phone]. Thank you

## 2020-08-10 RX ORDER — FAMOTIDINE 20 MG/50ML
20 INJECTION, SOLUTION INTRAVENOUS ONCE
Status: CANCELLED | OUTPATIENT
Start: 2020-08-10 | End: 2020-08-10

## 2020-08-10 RX ORDER — METRONIDAZOLE 500 MG/100ML
500 INJECTION, SOLUTION INTRAVENOUS ONCE
Status: CANCELLED | OUTPATIENT
Start: 2020-08-10 | End: 2020-08-10

## 2020-08-10 RX ORDER — CEFAZOLIN SODIUM 1 G/50ML
1 SOLUTION INTRAVENOUS ONCE
Status: CANCELLED | OUTPATIENT
Start: 2020-08-10 | End: 2020-08-10

## 2020-08-10 RX ORDER — DIPHENHYDRAMINE HYDROCHLORIDE 50 MG/ML
25 INJECTION INTRAMUSCULAR; INTRAVENOUS ONCE
Status: CANCELLED | OUTPATIENT
Start: 2020-08-10 | End: 2020-08-10

## 2020-08-10 NOTE — PROGRESS NOTES
Patient reports his transportation is coming from [place], [place].  They are attempting to arrive for 0930; however, morning traffic may affect this.

## 2020-08-11 ENCOUNTER — HOSPITAL ENCOUNTER (OUTPATIENT)
Facility: HOSPITAL | Age: 75
Discharge: HOME | End: 2020-08-11
Attending: RADIOLOGY | Admitting: INTERNAL MEDICINE
Payer: MEDICARE

## 2020-08-11 ENCOUNTER — HOSPITAL ENCOUNTER (OUTPATIENT)
Dept: RADIOLOGY | Facility: HOSPITAL | Age: 75
Discharge: HOME | End: 2020-08-11
Attending: PHYSICIAN ASSISTANT
Payer: MEDICARE

## 2020-08-11 VITALS
OXYGEN SATURATION: 99 % | DIASTOLIC BLOOD PRESSURE: 86 MMHG | HEIGHT: 71 IN | HEART RATE: 62 BPM | BODY MASS INDEX: 23.38 KG/M2 | SYSTOLIC BLOOD PRESSURE: 162 MMHG | WEIGHT: 167 LBS | TEMPERATURE: 99 F | RESPIRATION RATE: 15 BRPM

## 2020-08-11 DIAGNOSIS — C22.0 HCC (HEPATOCELLULAR CARCINOMA): ICD-10-CM

## 2020-08-11 DIAGNOSIS — C22.0 HEPATOCELLULAR CARCINOMA: ICD-10-CM

## 2020-08-11 LAB
AFP SERPL-MCNC: 2.9 NG/ML (ref 0–8.4)
ALBUMIN SERPL BCP-MCNC: 3.3 G/DL (ref 3.5–5.2)
ALP SERPL-CCNC: 121 U/L (ref 55–135)
ALT SERPL W/O P-5'-P-CCNC: 25 U/L (ref 10–44)
ANION GAP SERPL CALC-SCNC: 9 MMOL/L (ref 8–16)
APTT BLDCRRT: 30.2 SEC (ref 21–32)
AST SERPL-CCNC: 40 U/L (ref 10–40)
BASOPHILS # BLD AUTO: 0.04 K/UL (ref 0–0.2)
BASOPHILS NFR BLD: 0.6 % (ref 0–1.9)
BILIRUB SERPL-MCNC: 0.8 MG/DL (ref 0.1–1)
BUN SERPL-MCNC: 18 MG/DL (ref 8–23)
CALCIUM SERPL-MCNC: 9.3 MG/DL (ref 8.7–10.5)
CHLORIDE SERPL-SCNC: 103 MMOL/L (ref 95–110)
CO2 SERPL-SCNC: 27 MMOL/L (ref 23–29)
CREAT SERPL-MCNC: 1.5 MG/DL (ref 0.5–1.4)
DIFFERENTIAL METHOD: ABNORMAL
EOSINOPHIL # BLD AUTO: 0.3 K/UL (ref 0–0.5)
EOSINOPHIL NFR BLD: 4.9 % (ref 0–8)
ERYTHROCYTE [DISTWIDTH] IN BLOOD BY AUTOMATED COUNT: 13.2 % (ref 11.5–14.5)
EST. GFR  (AFRICAN AMERICAN): 53 ML/MIN/1.73 M^2
EST. GFR  (NON AFRICAN AMERICAN): 46 ML/MIN/1.73 M^2
GLUCOSE SERPL-MCNC: 87 MG/DL (ref 70–110)
HCT VFR BLD AUTO: 42 % (ref 40–54)
HGB BLD-MCNC: 13.3 G/DL (ref 14–18)
IMM GRANULOCYTES # BLD AUTO: 0.03 K/UL (ref 0–0.04)
IMM GRANULOCYTES NFR BLD AUTO: 0.4 % (ref 0–0.5)
INR PPP: 1 (ref 0.8–1.2)
LYMPHOCYTES # BLD AUTO: 0.8 K/UL (ref 1–4.8)
LYMPHOCYTES NFR BLD: 11.3 % (ref 18–48)
MCH RBC QN AUTO: 30 PG (ref 27–31)
MCHC RBC AUTO-ENTMCNC: 31.7 G/DL (ref 32–36)
MCV RBC AUTO: 95 FL (ref 82–98)
MONOCYTES # BLD AUTO: 0.7 K/UL (ref 0.3–1)
MONOCYTES NFR BLD: 10 % (ref 4–15)
NEUTROPHILS # BLD AUTO: 4.9 K/UL (ref 1.8–7.7)
NEUTROPHILS NFR BLD: 72.8 % (ref 38–73)
NRBC BLD-RTO: 0 /100 WBC
PLATELET # BLD AUTO: 224 K/UL (ref 150–350)
PMV BLD AUTO: 9.1 FL (ref 9.2–12.9)
POTASSIUM SERPL-SCNC: 4 MMOL/L (ref 3.5–5.1)
PROT SERPL-MCNC: 8 G/DL (ref 6–8.4)
PROTHROMBIN TIME: 10.3 SEC (ref 9–12.5)
RBC # BLD AUTO: 4.43 M/UL (ref 4.6–6.2)
SARS-COV-2 RDRP RESP QL NAA+PROBE: NEGATIVE
SODIUM SERPL-SCNC: 139 MMOL/L (ref 136–145)
WBC # BLD AUTO: 6.79 K/UL (ref 3.9–12.7)

## 2020-08-11 PROCEDURE — 78201 LIVER IMAGING STATIC ONLY: CPT | Mod: TC

## 2020-08-11 PROCEDURE — 85610 PROTHROMBIN TIME: CPT

## 2020-08-11 PROCEDURE — 80053 COMPREHEN METABOLIC PANEL: CPT

## 2020-08-11 PROCEDURE — 63600175 PHARM REV CODE 636 W HCPCS: Performed by: RADIOLOGY

## 2020-08-11 PROCEDURE — C1769 GUIDE WIRE: HCPCS | Performed by: RADIOLOGY

## 2020-08-11 PROCEDURE — 25000003 PHARM REV CODE 250: Performed by: PHYSICIAN ASSISTANT

## 2020-08-11 PROCEDURE — U0002 COVID-19 LAB TEST NON-CDC: HCPCS

## 2020-08-11 PROCEDURE — A9540 TC99M MAA: HCPCS

## 2020-08-11 PROCEDURE — 85730 THROMBOPLASTIN TIME PARTIAL: CPT

## 2020-08-11 PROCEDURE — 82105 ALPHA-FETOPROTEIN SERUM: CPT

## 2020-08-11 PROCEDURE — C1887 CATHETER, GUIDING: HCPCS | Performed by: RADIOLOGY

## 2020-08-11 PROCEDURE — C1894 INTRO/SHEATH, NON-LASER: HCPCS | Performed by: RADIOLOGY

## 2020-08-11 PROCEDURE — C1760 CLOSURE DEV, VASC: HCPCS | Performed by: RADIOLOGY

## 2020-08-11 PROCEDURE — 85025 COMPLETE CBC W/AUTO DIFF WBC: CPT

## 2020-08-11 PROCEDURE — 25000003 PHARM REV CODE 250: Performed by: RADIOLOGY

## 2020-08-11 RX ORDER — LIDOCAINE HYDROCHLORIDE 20 MG/ML
INJECTION, SOLUTION INFILTRATION; PERINEURAL
Status: COMPLETED | OUTPATIENT
Start: 2020-08-11 | End: 2020-08-11

## 2020-08-11 RX ORDER — SODIUM CHLORIDE 9 MG/ML
INJECTION, SOLUTION INTRAVENOUS CONTINUOUS
Status: DISCONTINUED | OUTPATIENT
Start: 2020-08-11 | End: 2020-08-11 | Stop reason: HOSPADM

## 2020-08-11 RX ORDER — LORAZEPAM 2 MG/ML
INJECTION INTRAMUSCULAR
Status: COMPLETED | OUTPATIENT
Start: 2020-08-11 | End: 2020-08-11

## 2020-08-11 RX ORDER — FENTANYL CITRATE 50 UG/ML
INJECTION, SOLUTION INTRAMUSCULAR; INTRAVENOUS
Status: COMPLETED | OUTPATIENT
Start: 2020-08-11 | End: 2020-08-11

## 2020-08-11 RX ORDER — ONDANSETRON 4 MG/1
4 TABLET, FILM COATED ORAL EVERY 6 HOURS PRN
Status: DISCONTINUED | OUTPATIENT
Start: 2020-08-11 | End: 2020-08-11 | Stop reason: HOSPADM

## 2020-08-11 RX ORDER — MIDAZOLAM HYDROCHLORIDE 1 MG/ML
INJECTION INTRAMUSCULAR; INTRAVENOUS
Status: COMPLETED | OUTPATIENT
Start: 2020-08-11 | End: 2020-08-11

## 2020-08-11 RX ADMIN — SODIUM CHLORIDE: 0.9 INJECTION, SOLUTION INTRAVENOUS at 10:08

## 2020-08-11 RX ADMIN — MIDAZOLAM HYDROCHLORIDE 1 MG: 1 INJECTION, SOLUTION INTRAMUSCULAR; INTRAVENOUS at 11:08

## 2020-08-11 RX ADMIN — FENTANYL CITRATE 25 MCG: 50 INJECTION, SOLUTION INTRAMUSCULAR; INTRAVENOUS at 11:08

## 2020-08-11 RX ADMIN — MIDAZOLAM HYDROCHLORIDE 0.5 MG: 1 INJECTION, SOLUTION INTRAMUSCULAR; INTRAVENOUS at 11:08

## 2020-08-11 RX ADMIN — MIDAZOLAM HYDROCHLORIDE 1.5 MG: 1 INJECTION, SOLUTION INTRAMUSCULAR; INTRAVENOUS at 11:08

## 2020-08-11 RX ADMIN — SODIUM CHLORIDE: 0.9 INJECTION, SOLUTION INTRAVENOUS at 12:08

## 2020-08-11 RX ADMIN — LIDOCAINE HYDROCHLORIDE 10 ML: 20 INJECTION, SOLUTION INFILTRATION; PERINEURAL at 11:08

## 2020-08-11 RX ADMIN — FENTANYL CITRATE 75 MCG: 50 INJECTION, SOLUTION INTRAMUSCULAR; INTRAVENOUS at 11:08

## 2020-08-11 NOTE — DISCHARGE INSTRUCTIONS
"Post-op Heart Catheterization    1. DIET: It is advisable for you to follow a diet that limits the intake of salt, sugar, saturated fats and cholesterol.     2. FOR THE NEXT 24 HOURS:   · For the next 8 hours, you should be watched by a responsible adult. This person should make sure your condition is not getting worse.   · Don't drink any alcohol for the next 24 hours.  · Don't drive, operate dangerous machinery, or make important business or personal decisions during the next 24 hours.  · Your healthcare provider may tell you not to take any medicine by mouth for pain or sleep in the next 4 hours. These medicines may react with the medicines you were given in the hospital. This could cause a much stronger response than usual.       3. ACTIVITY:                                Day of discharge:             NO vigorous activity, lifting or straining                                                   Day after discharge:         Avoid heavy lifting (up to 10 lbs)                                                                        The day after discharge you may shower, but avoid tub baths for 5 days                                                                         Wash site gently with soap and water        NO powder or lotion to your procedure site.                 Before you shower, remove dressing, apply bandaid if desired                                                                                                                                                                            2nd day after discharge:  Resume normal activities                                                                         Exercise program as instructed      4. WOUND CARE: It is not unusual to have a small amount of bruising appear in the groin area. It is also common to have a tender "knot" develop beneath the skin at the puncture site in the groin. This is scar tissue only and is not a cause for concern or alarm. " "This tender knot may take several weeks to fully resolve. The bruise will usually spread over several days. If the lump gets bigger, call you doctor immediately.    5. DISCOMFORT: For general discomfort at the puncture site, you may take 1 or 2 Acetaminophen (Tylenol) tablets every 4 hours as needed. (Do not take more than 4000 mg a day)                 6. CALL YOUR HEALTHCARE PROVIDER IF YOU START TO HAVE THE FOLLOWING SYMPTOMS:        1. Problems with the affected leg: Pain, discomfort, loss of warmth, numbness or tingling                                                                                                                            2. Problems at the groin site: Bleeding, pain that is sudden/sharp/persistent,                   swelling at site or a change in "lump" size, increased redness or drainage at                     puncture site                                                               3. High fever (101 degrees or higher)       4.  Drowsiness that doesn't get better       5. Weakness or dizziness that doesn't get better            6. Repeated vomiting        7. GO TO  THE EMERGENCY ROOM OR CALL 911 IF YOU HAVE:  if your foot or leg becomes cold or discolored or uncontrolled bleeding from site (apply direct pressure above site).  "

## 2020-08-11 NOTE — DISCHARGE SUMMARY
Radiology Discharge Summary      Hospital Course: No complications    Admit Date: 8/11/2020  Discharge Date: 08/11/2020     Instructions Given to Patient: Yes  Diet: regular diet and Resume prior diet  Activity: activity as tolerated and no driving for today    Description of Condition on Discharge: Stable  Vital Signs (Most Recent): Temp: 98.1 °F (36.7 °C) (08/11/20 1016)  Pulse: 78 (08/11/20 1016)  Resp: 18 (08/11/20 1016)  BP: (!) 154/86 (08/11/20 1018)  SpO2: 100 % (08/11/20 1016)    Discharge Disposition: Home    Discharge Diagnosis: hcc     Follow-up: y90 in 2 weeks    Geovanni Calero MD  Staff Radiologist  Department of Radiology  Pager: [phone]

## 2020-08-11 NOTE — H&P
[facility] -   History & Physical    Subjective:      Chief Complaint/Reason for Admission: HCC    Shahab Pompa is a 74 y.o. male.    Past Medical History:   Diagnosis Date    Cancer     prostate, bladder, and ear    Fibrosis lung     GERD (gastroesophageal reflux disease)     Hepatitis C     tx'd in 2016 w/ Harvoni - achieved SVR    Neuromuscular disorder      Past Surgical History:   Procedure Laterality Date    BLADDER SURGERY      cancer removal    CAROTID ENDARTERECTOMY Right     COMPUTED TOMOGRAPHY N/A 6/18/2019    Procedure: CT (COMPUTED TOMOGRAPHY);  Surgeon: [facility] Diagnostic Provider;  Location: [facility] OR;  Service: General;  Laterality: N/A;  Microwave ablation of liver to be done in CT  Need CRNA and cart    COMPUTED TOMOGRAPHY N/A 9/10/2019    Procedure: CT (COMPUTED TOMOGRAPHY);  Surgeon: [facility] Diagnostic Provider;  Location: [facility] OR;  Service: General;  Laterality: N/A;  Microwave ablation liver to be done in CT  Need CRNA and cart    FLUOROSCOPY N/A 1/31/2019    Procedure: Y90 POST;  Surgeon: Geovanni Calero MD;  Location: [facility] CATH LAB;  Service: General;  Laterality: N/A;    FLUOROSCOPY N/A 1/24/2019    Procedure: Y90 mapping;  Surgeon: Geovanni Calero MD;  Location: [facility] CATH LAB;  Service: General;  Laterality: N/A;    FLUOROSCOPY N/A 5/8/2019    Procedure: FLUOROSCOPY;  Surgeon: [facility] Diagnostic Provider;  Location: [facility];  Service: General;  Laterality: N/A;  microwave ablation to be done in CT      FLUOROSCOPY N/A 4/29/2019    Procedure: TACE;  Surgeon: Geovanni Calero MD;  Location: [facility] CATH LAB;  Service: General;  Laterality: N/A;    FLUOROSCOPY N/A 7/21/2020    Procedure: Y90 mapping;  Surgeon: Geovanni Calero MD;  Location: [facility] CATH LAB;  Service: General;  Laterality: N/A;    INGUINAL HERNIA REPAIR Bilateral     KNEE SURGERY      LAPAROSCOPY N/A 1/4/2019    Procedure: LAPAROSCOPY Exploratory;  Surgeon: Edwar Fulton MD;  Location: [facility] OR [address] [place] [address];   Service: Transplant;  Laterality: N/A;    PROSTATE SURGERY      cancer removal     Family History   Problem Relation Age of Onset    Cancer Mother     Cancer Father         bladder and prostate    Colon cancer Neg Hx     Stomach cancer Neg Hx      Social History     Tobacco Use    Smoking status: Current Every Day Smoker     Packs/day: 0.50     Years: 30.00     Pack years: 15.00    Smokeless tobacco: Never Used   Substance Use Topics    Alcohol use: No    Drug use: No       PTA Medications   Medication Sig    amLODIPine (NORVASC) 5 MG tablet Take 5 mg by mouth nightly as needed.    aspirin-sod bicarb-citric acid (SHON-SELTZER) 324 mg TbEF Take 325 mg by mouth every 6 (six) hours as needed.    COMBIVENT RESPIMAT  mcg/actuation inhaler every evening.     cyclobenzaprine HCl (FLEXERIL ORAL) Take by mouth every evening.    desonide (DESOWEN) 0.05 % lotion Apply topically. Once in 2 days for rash    diazePAM (VALIUM) 5 MG tablet Take 1 tablet (5 mg total) by mouth On call Procedure for Anxiety. Take 1 pill an hour before MRI and then 1 pill if needed before scan    eszopiclone (LUNESTA) 3 mg Tab Take 3 mg by mouth every evening.    lactulose (CHRONULAC) 10 gram/15 mL solution Take by mouth as needed.    mometasone 0.1% (ELOCON) 0.1 % cream Apply topically as needed (rash).    morphine (MSIR) 15 MG tablet Take 15 mg by mouth every 8 (eight) hours as needed for Pain.     ondansetron (ZOFRAN) 8 MG tablet Take 1 tablet (8 mg total) by mouth every 8 (eight) hours as needed for Nausea.    oxyCODONE-acetaminophen (LYNOX) 5-300 mg per tablet Take 1 tablet by mouth every 4 (four) hours as needed for Pain.     Review of patient's allergies indicates:  No Known Allergies     Review of Systems   Constitutional: Negative.    HENT: Negative.    Eyes: Negative.    Cardiovascular: Negative.    Skin: Negative.        Objective:      Vital Signs (Most Recent)       Vital Signs Range (Last 24H):       Physical  Exam  Constitutional:       Appearance: Normal appearance.   HENT:      Head: Normocephalic.      Nose: Nose normal.   Neck:      Musculoskeletal: Normal range of motion.   Cardiovascular:      Rate and Rhythm: Normal rate.   Pulmonary:      Effort: Pulmonary effort is normal.         Data Review:    CBC:   Lab Results   Component Value Date    WBC 7.72 06/24/2020    RBC 4.94 06/24/2020    HGB 14.7 06/24/2020    HCT 47.2 06/24/2020     06/24/2020      ECG: no prior ECG.     Assessment:      Active Hospital Problems    Diagnosis  POA    Hepatocellular carcinoma [C22.0]  Yes     Priority: High      Resolved Hospital Problems   No resolved problems to display.       Plan:    Y90 for HCC treatment

## 2020-08-11 NOTE — PLAN OF CARE
VSS. Ambulating to bathroom to urinate without problems. DC instructions reviewed and able to teach back. Drinking and tolerating.No bleeding or hematoma noted to right groin.Out to car wearing a mask, assisted by staff.

## 2020-08-11 NOTE — PROCEDURES
Radiology Post-Procedure Note    Pre Op Diagnosis: Hepatocellular carcinoma    Post Op Diagnosis: Same    Procedure: Yttrium planning    Procedure performed by: Geovanni Calero MD    Written Informed Consent Obtained: Yes    Specimen Removed: No    Estimated Blood Loss: Minimal    Findings: no complications  Patient tolerated procedure well.    Geovanni Calero MD  Staff Radiologist  Department of Radiology  Pager: [phone]

## 2020-08-17 DIAGNOSIS — C22.0 HCC (HEPATOCELLULAR CARCINOMA): Primary | ICD-10-CM

## 2020-09-02 ENCOUNTER — HOSPITAL ENCOUNTER (OUTPATIENT)
Facility: HOSPITAL | Age: 75
Discharge: HOME | End: 2020-09-02
Attending: RADIOLOGY | Admitting: RADIOLOGY
Payer: MEDICARE

## 2020-09-02 ENCOUNTER — HOSPITAL ENCOUNTER (OUTPATIENT)
Dept: RADIOLOGY | Facility: HOSPITAL | Age: 75
Discharge: HOME | End: 2020-09-02
Attending: PHYSICIAN ASSISTANT | Admitting: RADIOLOGY
Payer: MEDICARE

## 2020-09-02 VITALS
HEIGHT: 71 IN | HEART RATE: 78 BPM | OXYGEN SATURATION: 98 % | RESPIRATION RATE: 17 BRPM | TEMPERATURE: 99 F | DIASTOLIC BLOOD PRESSURE: 108 MMHG | BODY MASS INDEX: 23.66 KG/M2 | SYSTOLIC BLOOD PRESSURE: 128 MMHG | WEIGHT: 169 LBS

## 2020-09-02 DIAGNOSIS — C22.0 HCC (HEPATOCELLULAR CARCINOMA): ICD-10-CM

## 2020-09-02 LAB
ALBUMIN SERPL BCP-MCNC: 3.7 G/DL (ref 3.5–5.2)
ALP SERPL-CCNC: 127 U/L (ref 55–135)
ALT SERPL W/O P-5'-P-CCNC: 19 U/L (ref 10–44)
ANION GAP SERPL CALC-SCNC: 9 MMOL/L (ref 8–16)
APTT BLDCRRT: 31.2 SEC (ref 21–32)
AST SERPL-CCNC: 38 U/L (ref 10–40)
BASOPHILS # BLD AUTO: 0.03 K/UL (ref 0–0.2)
BASOPHILS NFR BLD: 0.4 % (ref 0–1.9)
BILIRUB SERPL-MCNC: 0.9 MG/DL (ref 0.1–1)
BUN SERPL-MCNC: 15 MG/DL (ref 8–23)
CALCIUM SERPL-MCNC: 9.4 MG/DL (ref 8.7–10.5)
CHLORIDE SERPL-SCNC: 103 MMOL/L (ref 95–110)
CO2 SERPL-SCNC: 25 MMOL/L (ref 23–29)
CREAT SERPL-MCNC: 1.5 MG/DL (ref 0.5–1.4)
DIFFERENTIAL METHOD: ABNORMAL
EOSINOPHIL # BLD AUTO: 0.4 K/UL (ref 0–0.5)
EOSINOPHIL NFR BLD: 4.6 % (ref 0–8)
ERYTHROCYTE [DISTWIDTH] IN BLOOD BY AUTOMATED COUNT: 13.6 % (ref 11.5–14.5)
EST. GFR  (AFRICAN AMERICAN): 52 ML/MIN/1.73 M^2
EST. GFR  (NON AFRICAN AMERICAN): 45 ML/MIN/1.73 M^2
GLUCOSE SERPL-MCNC: 86 MG/DL (ref 70–110)
HCT VFR BLD AUTO: 47.4 % (ref 40–54)
HGB BLD-MCNC: 14.9 G/DL (ref 14–18)
IMM GRANULOCYTES # BLD AUTO: 0.03 K/UL (ref 0–0.04)
IMM GRANULOCYTES NFR BLD AUTO: 0.4 % (ref 0–0.5)
INR PPP: 1 (ref 0.8–1.2)
LYMPHOCYTES # BLD AUTO: 0.9 K/UL (ref 1–4.8)
LYMPHOCYTES NFR BLD: 11 % (ref 18–48)
MCH RBC QN AUTO: 29.9 PG (ref 27–31)
MCHC RBC AUTO-ENTMCNC: 31.4 G/DL (ref 32–36)
MCV RBC AUTO: 95 FL (ref 82–98)
MONOCYTES # BLD AUTO: 0.9 K/UL (ref 0.3–1)
MONOCYTES NFR BLD: 10.7 % (ref 4–15)
NEUTROPHILS # BLD AUTO: 5.9 K/UL (ref 1.8–7.7)
NEUTROPHILS NFR BLD: 72.9 % (ref 38–73)
NRBC BLD-RTO: 0 /100 WBC
PLATELET # BLD AUTO: 204 K/UL (ref 150–350)
PMV BLD AUTO: 9.4 FL (ref 9.2–12.9)
POTASSIUM SERPL-SCNC: 4.1 MMOL/L (ref 3.5–5.1)
PROT SERPL-MCNC: 8.7 G/DL (ref 6–8.4)
PROTHROMBIN TIME: 10.5 SEC (ref 9–12.5)
RBC # BLD AUTO: 4.99 M/UL (ref 4.6–6.2)
SARS-COV-2 RDRP RESP QL NAA+PROBE: NEGATIVE
SODIUM SERPL-SCNC: 137 MMOL/L (ref 136–145)
WBC # BLD AUTO: 8.12 K/UL (ref 3.9–12.7)

## 2020-09-02 PROCEDURE — 85025 COMPLETE CBC W/AUTO DIFF WBC: CPT

## 2020-09-02 PROCEDURE — 25000003 PHARM REV CODE 250: Performed by: PHYSICIAN ASSISTANT

## 2020-09-02 PROCEDURE — 82105 ALPHA-FETOPROTEIN SERUM: CPT

## 2020-09-02 PROCEDURE — 85610 PROTHROMBIN TIME: CPT

## 2020-09-02 PROCEDURE — 63600175 PHARM REV CODE 636 W HCPCS: Performed by: RADIOLOGY

## 2020-09-02 PROCEDURE — C1769 GUIDE WIRE: HCPCS | Performed by: RADIOLOGY

## 2020-09-02 PROCEDURE — C1760 CLOSURE DEV, VASC: HCPCS | Performed by: RADIOLOGY

## 2020-09-02 PROCEDURE — C1894 INTRO/SHEATH, NON-LASER: HCPCS | Performed by: RADIOLOGY

## 2020-09-02 PROCEDURE — 78201 LIVER IMAGING STATIC ONLY: CPT | Mod: TC

## 2020-09-02 PROCEDURE — S0028 INJECTION, FAMOTIDINE, 20 MG: HCPCS | Performed by: PHYSICIAN ASSISTANT

## 2020-09-02 PROCEDURE — 25000003 PHARM REV CODE 250: Performed by: RADIOLOGY

## 2020-09-02 PROCEDURE — C1887 CATHETER, GUIDING: HCPCS | Performed by: RADIOLOGY

## 2020-09-02 PROCEDURE — 85730 THROMBOPLASTIN TIME PARTIAL: CPT

## 2020-09-02 PROCEDURE — 63600175 PHARM REV CODE 636 W HCPCS: Performed by: PHYSICIAN ASSISTANT

## 2020-09-02 PROCEDURE — S0030 INJECTION, METRONIDAZOLE: HCPCS | Performed by: PHYSICIAN ASSISTANT

## 2020-09-02 PROCEDURE — C2616 BRACHYTX, NON-STR,YTTRIUM-90: HCPCS | Mod: 59 | Performed by: RADIOLOGY

## 2020-09-02 PROCEDURE — U0002 COVID-19 LAB TEST NON-CDC: HCPCS

## 2020-09-02 PROCEDURE — 80053 COMPREHEN METABOLIC PANEL: CPT

## 2020-09-02 DEVICE — ANGIO-SEAL VIP VASCULAR CLOSURE DEVICE
Type: IMPLANTABLE DEVICE | Site: LIVER | Status: FUNCTIONAL
Brand: ANGIO-SEAL

## 2020-09-02 DEVICE — THERASPHERE Y90: Type: IMPLANTABLE DEVICE | Site: LIVER | Status: FUNCTIONAL

## 2020-09-02 RX ORDER — FAMOTIDINE 20 MG/50ML
20 INJECTION, SOLUTION INTRAVENOUS ONCE
Status: COMPLETED | OUTPATIENT
Start: 2020-09-02 | End: 2020-09-02

## 2020-09-02 RX ORDER — SODIUM CHLORIDE 9 MG/ML
INJECTION, SOLUTION INTRAVENOUS CONTINUOUS
Status: DISCONTINUED | OUTPATIENT
Start: 2020-09-02 | End: 2020-09-02 | Stop reason: HOSPADM

## 2020-09-02 RX ORDER — MIDAZOLAM HYDROCHLORIDE 1 MG/ML
INJECTION INTRAMUSCULAR; INTRAVENOUS
Status: COMPLETED | OUTPATIENT
Start: 2020-09-02 | End: 2020-09-02

## 2020-09-02 RX ORDER — FENTANYL CITRATE 50 UG/ML
INJECTION, SOLUTION INTRAMUSCULAR; INTRAVENOUS
Status: COMPLETED | OUTPATIENT
Start: 2020-09-02 | End: 2020-09-02

## 2020-09-02 RX ORDER — METRONIDAZOLE 500 MG/100ML
500 INJECTION, SOLUTION INTRAVENOUS ONCE
Status: COMPLETED | OUTPATIENT
Start: 2020-09-02 | End: 2020-09-02

## 2020-09-02 RX ORDER — LIDOCAINE HYDROCHLORIDE 20 MG/ML
INJECTION, SOLUTION INFILTRATION; PERINEURAL
Status: COMPLETED | OUTPATIENT
Start: 2020-09-02 | End: 2020-09-02

## 2020-09-02 RX ORDER — SODIUM CHLORIDE 9 MG/ML
INJECTION, SOLUTION INTRAVENOUS CONTINUOUS
Status: ACTIVE | OUTPATIENT
Start: 2020-09-02

## 2020-09-02 RX ORDER — ONDANSETRON 2 MG/ML
4 INJECTION INTRAMUSCULAR; INTRAVENOUS EVERY 8 HOURS PRN
Status: DISCONTINUED | OUTPATIENT
Start: 2020-09-02 | End: 2020-09-02 | Stop reason: HOSPADM

## 2020-09-02 RX ORDER — CEFAZOLIN SODIUM 1 G/50ML
1 SOLUTION INTRAVENOUS ONCE
Status: COMPLETED | OUTPATIENT
Start: 2020-09-02 | End: 2020-09-02

## 2020-09-02 RX ORDER — SODIUM CHLORIDE 9 MG/ML
INJECTION, SOLUTION INTRAVENOUS CONTINUOUS PRN
Status: COMPLETED | OUTPATIENT
Start: 2020-09-02 | End: 2020-09-02

## 2020-09-02 RX ORDER — DIPHENHYDRAMINE HYDROCHLORIDE 50 MG/ML
25 INJECTION INTRAMUSCULAR; INTRAVENOUS ONCE
Status: COMPLETED | OUTPATIENT
Start: 2020-09-02 | End: 2020-09-02

## 2020-09-02 RX ADMIN — METRONIDAZOLE 500 MG: 500 INJECTION, SOLUTION INTRAVENOUS at 01:09

## 2020-09-02 RX ADMIN — ONDANSETRON 16 MG: 2 INJECTION INTRAMUSCULAR; INTRAVENOUS at 12:09

## 2020-09-02 RX ADMIN — MIDAZOLAM HYDROCHLORIDE 1 MG: 1 INJECTION, SOLUTION INTRAMUSCULAR; INTRAVENOUS at 02:09

## 2020-09-02 RX ADMIN — SODIUM CHLORIDE: 0.9 INJECTION, SOLUTION INTRAVENOUS at 12:09

## 2020-09-02 RX ADMIN — FAMOTIDINE 20 MG: 20 INJECTION, SOLUTION INTRAVENOUS at 12:09

## 2020-09-02 RX ADMIN — CEFAZOLIN SODIUM 1 G: 1 SOLUTION INTRAVENOUS at 02:09

## 2020-09-02 RX ADMIN — LIDOCAINE HYDROCHLORIDE 5 ML: 20 INJECTION, SOLUTION INFILTRATION; PERINEURAL at 02:09

## 2020-09-02 RX ADMIN — HYDROCORTISONE SODIUM SUCCINATE 200 MG: 100 INJECTION, POWDER, FOR SOLUTION INTRAMUSCULAR; INTRAVENOUS at 12:09

## 2020-09-02 RX ADMIN — MIDAZOLAM HYDROCHLORIDE 2 MG: 1 INJECTION, SOLUTION INTRAMUSCULAR; INTRAVENOUS at 02:09

## 2020-09-02 RX ADMIN — DIPHENHYDRAMINE HYDROCHLORIDE 25 MG: 50 INJECTION, SOLUTION INTRAMUSCULAR; INTRAVENOUS at 12:09

## 2020-09-02 RX ADMIN — SODIUM CHLORIDE 100 ML/HR: 0.9 INJECTION, SOLUTION INTRAVENOUS at 02:09

## 2020-09-02 RX ADMIN — FENTANYL CITRATE 100 MCG: 50 INJECTION, SOLUTION INTRAMUSCULAR; INTRAVENOUS at 02:09

## 2020-09-02 NOTE — INTERVAL H&P NOTE
The patient has been examined and the H&P has been reviewed:    I concur with the findings and no changes have occurred since H&P was written.    Anesthesia risks, benefits and alternative options discussed and understood by patient/family.          Active Hospital Problems    Diagnosis  POA    HCC (hepatocellular carcinoma) [C22.0]  Yes     Priority: Low      Resolved Hospital Problems   No resolved problems to display.

## 2020-09-02 NOTE — PROCEDURES
Radiology Post-Procedure Note    Pre Op Diagnosis: Hepatocellular carcinoma    Post Op Diagnosis: Same    Procedure: Yttrium treatment    Procedure performed by: Dr Geovanni Calero    Written Informed Consent Obtained: Yes    Specimen Removed: No    Estimated Blood Loss: Minimal    Findings: no complications  Patient tolerated procedure well.    Geovanni Calero MD  Staff Radiologist  Department of Radiology  Pager: [phone]

## 2020-09-03 DIAGNOSIS — C22.0 HCC (HEPATOCELLULAR CARCINOMA): Primary | ICD-10-CM

## 2020-09-03 LAB — AFP SERPL-MCNC: 3.4 NG/ML (ref 0–8.4)

## 2020-09-10 DIAGNOSIS — C22.0 HCC (HEPATOCELLULAR CARCINOMA): ICD-10-CM

## 2020-09-10 DIAGNOSIS — Z86.19 HISTORY OF HEPATITIS C: Primary | ICD-10-CM

## 2020-09-24 ENCOUNTER — HOSPITAL ENCOUNTER (OUTPATIENT)
Facility: HOSPITAL | Age: 75
Discharge: HOME | End: 2020-09-24
Attending: RADIOLOGY | Admitting: RADIOLOGY
Payer: MEDICARE

## 2020-09-24 ENCOUNTER — HOSPITAL ENCOUNTER (OUTPATIENT)
Dept: RADIOLOGY | Facility: HOSPITAL | Age: 75
Discharge: HOME | End: 2020-09-24
Attending: PHYSICIAN ASSISTANT | Admitting: RADIOLOGY
Payer: MEDICARE

## 2020-09-24 VITALS
DIASTOLIC BLOOD PRESSURE: 81 MMHG | HEIGHT: 71 IN | SYSTOLIC BLOOD PRESSURE: 149 MMHG | RESPIRATION RATE: 20 BRPM | BODY MASS INDEX: 23.8 KG/M2 | HEART RATE: 65 BPM | OXYGEN SATURATION: 98 % | TEMPERATURE: 98 F | WEIGHT: 170 LBS

## 2020-09-24 DIAGNOSIS — C22.0 HCC (HEPATOCELLULAR CARCINOMA): ICD-10-CM

## 2020-09-24 LAB — SARS-COV-2 RDRP RESP QL NAA+PROBE: NEGATIVE

## 2020-09-24 PROCEDURE — 25000003 PHARM REV CODE 250: Performed by: PHYSICIAN ASSISTANT

## 2020-09-24 PROCEDURE — C2616 BRACHYTX, NON-STR,YTTRIUM-90: HCPCS | Mod: 59 | Performed by: RADIOLOGY

## 2020-09-24 PROCEDURE — 63600175 PHARM REV CODE 636 W HCPCS: Performed by: RADIOLOGY

## 2020-09-24 PROCEDURE — C1760 CLOSURE DEV, VASC: HCPCS | Performed by: RADIOLOGY

## 2020-09-24 PROCEDURE — U0002 COVID-19 LAB TEST NON-CDC: HCPCS

## 2020-09-24 PROCEDURE — 82105 ALPHA-FETOPROTEIN SERUM: CPT

## 2020-09-24 PROCEDURE — 78201 LIVER IMAGING STATIC ONLY: CPT | Mod: TC

## 2020-09-24 PROCEDURE — S0030 INJECTION, METRONIDAZOLE: HCPCS | Performed by: PHYSICIAN ASSISTANT

## 2020-09-24 PROCEDURE — 63600175 PHARM REV CODE 636 W HCPCS: Performed by: PHYSICIAN ASSISTANT

## 2020-09-24 PROCEDURE — 25000003 PHARM REV CODE 250: Performed by: RADIOLOGY

## 2020-09-24 PROCEDURE — C1769 GUIDE WIRE: HCPCS | Performed by: RADIOLOGY

## 2020-09-24 PROCEDURE — S0028 INJECTION, FAMOTIDINE, 20 MG: HCPCS | Performed by: PHYSICIAN ASSISTANT

## 2020-09-24 PROCEDURE — C1887 CATHETER, GUIDING: HCPCS | Performed by: RADIOLOGY

## 2020-09-24 DEVICE — ANGIO-SEAL VIP VASCULAR CLOSURE DEVICE
Type: IMPLANTABLE DEVICE | Site: GROIN | Status: FUNCTIONAL
Brand: ANGIO-SEAL

## 2020-09-24 DEVICE — IMPLANTABLE DEVICE: Type: IMPLANTABLE DEVICE | Site: GROIN | Status: FUNCTIONAL

## 2020-09-24 RX ORDER — SODIUM CHLORIDE 9 MG/ML
INJECTION, SOLUTION INTRAVENOUS CONTINUOUS
Status: DISCONTINUED | OUTPATIENT
Start: 2020-09-24 | End: 2020-09-24 | Stop reason: HOSPADM

## 2020-09-24 RX ORDER — LIDOCAINE HYDROCHLORIDE 20 MG/ML
INJECTION, SOLUTION EPIDURAL; INFILTRATION; INTRACAUDAL; PERINEURAL
Status: COMPLETED | OUTPATIENT
Start: 2020-09-24 | End: 2020-09-24

## 2020-09-24 RX ORDER — ONDANSETRON 2 MG/ML
4 INJECTION INTRAMUSCULAR; INTRAVENOUS EVERY 8 HOURS PRN
Status: DISCONTINUED | OUTPATIENT
Start: 2020-09-24 | End: 2020-09-24 | Stop reason: HOSPADM

## 2020-09-24 RX ORDER — FENTANYL CITRATE 50 UG/ML
INJECTION, SOLUTION INTRAMUSCULAR; INTRAVENOUS
Status: COMPLETED | OUTPATIENT
Start: 2020-09-24 | End: 2020-09-24

## 2020-09-24 RX ORDER — FAMOTIDINE 20 MG/50ML
20 INJECTION, SOLUTION INTRAVENOUS ONCE
Status: COMPLETED | OUTPATIENT
Start: 2020-09-24 | End: 2020-09-24

## 2020-09-24 RX ORDER — DIPHENHYDRAMINE HYDROCHLORIDE 50 MG/ML
25 INJECTION INTRAMUSCULAR; INTRAVENOUS ONCE
Status: COMPLETED | OUTPATIENT
Start: 2020-09-24 | End: 2020-09-24

## 2020-09-24 RX ORDER — MIDAZOLAM HYDROCHLORIDE 1 MG/ML
INJECTION INTRAMUSCULAR; INTRAVENOUS
Status: COMPLETED | OUTPATIENT
Start: 2020-09-24 | End: 2020-09-24

## 2020-09-24 RX ORDER — CEFAZOLIN SODIUM 1 G/50ML
1 SOLUTION INTRAVENOUS ONCE
Status: DISCONTINUED | OUTPATIENT
Start: 2020-09-24 | End: 2020-09-24 | Stop reason: HOSPADM

## 2020-09-24 RX ORDER — METRONIDAZOLE 500 MG/100ML
500 INJECTION, SOLUTION INTRAVENOUS ONCE
Status: COMPLETED | OUTPATIENT
Start: 2020-09-24 | End: 2020-09-24

## 2020-09-24 RX ADMIN — FAMOTIDINE 20 MG: 20 INJECTION, SOLUTION INTRAVENOUS at 01:09

## 2020-09-24 RX ADMIN — METRONIDAZOLE 500 MG: 500 SOLUTION INTRAVENOUS at 11:09

## 2020-09-24 RX ADMIN — MIDAZOLAM HYDROCHLORIDE 1 MG: 1 INJECTION, SOLUTION INTRAMUSCULAR; INTRAVENOUS at 02:09

## 2020-09-24 RX ADMIN — HYDROCORTISONE SODIUM SUCCINATE 200 MG: 100 INJECTION, POWDER, FOR SOLUTION INTRAMUSCULAR; INTRAVENOUS at 11:09

## 2020-09-24 RX ADMIN — DIPHENHYDRAMINE HYDROCHLORIDE 25 MG: 50 INJECTION, SOLUTION INTRAMUSCULAR; INTRAVENOUS at 11:09

## 2020-09-24 RX ADMIN — SODIUM CHLORIDE: 0.9 INJECTION, SOLUTION INTRAVENOUS at 11:09

## 2020-09-24 RX ADMIN — FENTANYL CITRATE 50 MCG: 50 INJECTION, SOLUTION INTRAMUSCULAR; INTRAVENOUS at 02:09

## 2020-09-24 RX ADMIN — LIDOCAINE HYDROCHLORIDE 5 ML: 20 INJECTION, SOLUTION EPIDURAL; INFILTRATION; INTRACAUDAL; PERINEURAL at 02:09

## 2020-09-24 RX ADMIN — ONDANSETRON 16 MG: 2 INJECTION INTRAMUSCULAR; INTRAVENOUS at 11:09

## 2020-09-24 NOTE — SEDATION DOCUMENTATION
Y 90 procedure complete. Pt pavithra well. Family not present. RFA site free of bleeding or hematoma. Drsg intact. Report given to Kacy Vásquez RN. Pt to Nuclear medicine for scan.

## 2020-09-24 NOTE — PROCEDURES
Radiology Post-Procedure Note    Pre Op Diagnosis: Hepatocellular carcinoma    Post Op Diagnosis: Same    Procedure: Yttrium treatment    Procedure performed by: Dr Geovanni Calero    Written Informed Consent Obtained: Yes    Specimen Removed: No    Estimated Blood Loss: Minimal    Findings: no complications    Patient tolerated procedure well.    Geovanni Calero MD  Staff Radiologist  Department of Radiology  Pager: [phone]

## 2020-09-24 NOTE — PLAN OF CARE
Vitals stable: Ambulated in room. Cardiac monitor removed. PIV removed. D/C  instructions reviewed. Pt verbalized understanding. Wheeled to car via wheelchair accompanied by nurse. Site c/d/i. All belongings with patient

## 2020-09-24 NOTE — DISCHARGE SUMMARY
Radiology Discharge Summary      Hospital Course: No complications    Admit Date: 9/24/2020  Discharge Date: 09/24/2020     Instructions Given to Patient: Yes  Diet: regular diet and Resume prior diet  Activity: activity as tolerated and no driving for today    Description of Condition on Discharge: Stable  Vital Signs (Most Recent): Temp: 97.7 °F (36.5 °C) (09/24/20 1113)  Pulse: 65 (09/24/20 1425)  Resp: 16 (09/24/20 1425)  BP: 129/81 (09/24/20 1425)  SpO2: 98 % (09/24/20 1425)    Discharge Disposition: Home    Discharge Diagnosis: hcc     Follow-up: triple phase ct or mr in 2-3 months    Geovanni Calero MD  Staff Radiologist  Department of Radiology  Pager: [phone]

## 2020-09-24 NOTE — H&P
Inpatient Radiology Pre-procedure Note    History of Present Illness:  Shahab Pompa is a 75 y.o. male who presents for y90 radioembolization.  Admission H&P reviewed.  Past Medical History:   Diagnosis Date    Cancer     prostate, bladder, and ear    Fibrosis lung     GERD (gastroesophageal reflux disease)     Hepatitis C     tx'd in 2016 w/ Harvoni - achieved SVR    Neuromuscular disorder      Past Surgical History:   Procedure Laterality Date    BLADDER SURGERY      cancer removal    CAROTID ENDARTERECTOMY Right     COMPUTED TOMOGRAPHY N/A 6/18/2019    Procedure: CT (COMPUTED TOMOGRAPHY);  Surgeon: [facility] Diagnostic Provider;  Location: [facility] OR;  Service: General;  Laterality: N/A;  Microwave ablation of liver to be done in CT  Need CRNA and cart    COMPUTED TOMOGRAPHY N/A 9/10/2019    Procedure: CT (COMPUTED TOMOGRAPHY);  Surgeon: [facility] Diagnostic Provider;  Location: [facility] OR;  Service: General;  Laterality: N/A;  Microwave ablation liver to be done in CT  Need CRNA and cart    FLUOROSCOPY N/A 1/31/2019    Procedure: Y90 POST;  Surgeon: Geovanni Calero MD;  Location: [facility] CATH LAB;  Service: General;  Laterality: N/A;    FLUOROSCOPY N/A 1/24/2019    Procedure: Y90 mapping;  Surgeon: Geovanni Calero MD;  Location: [facility] CATH LAB;  Service: General;  Laterality: N/A;    FLUOROSCOPY N/A 5/8/2019    Procedure: FLUOROSCOPY;  Surgeon: [facility] Diagnostic Provider;  Location: [facility] OR;  Service: General;  Laterality: N/A;  microwave ablation to be done in CT      FLUOROSCOPY N/A 4/29/2019    Procedure: TACE;  Surgeon: Geovanni Calero MD;  Location: [facility] CATH LAB;  Service: General;  Laterality: N/A;    FLUOROSCOPY N/A 7/21/2020    Procedure: Y90 mapping;  Surgeon: Geovanni Calero MD;  Location: [facility] CATH LAB;  Service: General;  Laterality: N/A;    FLUOROSCOPY N/A 8/11/2020    Procedure: Y90 Mapping;  Surgeon: Geovanni Calero MD;  Location: [facility] CATH LAB;  Service: General;  Laterality: N/A;     FLUOROSCOPY N/A 9/2/2020    Procedure: Y90;  Surgeon: Geovanni Calero MD;  Location: [facility] CATH LAB;  Service: General;  Laterality: N/A;    INGUINAL HERNIA REPAIR Bilateral     KNEE SURGERY      LAPAROSCOPY N/A 1/4/2019    Procedure: LAPAROSCOPY Exploratory;  Surgeon: Edwar Fulton MD;  Location: [facility] OR [address] [place] [address];  Service: Transplant;  Laterality: N/A;    PROSTATE SURGERY      cancer removal       Review of Systems:   As documented in primary team H&P    Home Meds:   Prior to Admission medications    Medication Sig Start Date End Date Taking? Authorizing Provider   COMBIVENT RESPIMAT  mcg/actuation inhaler every evening.  5/28/16  Yes Historical Provider   cyclobenzaprine HCl (FLEXERIL ORAL) Take by mouth every evening.   Yes Historical Provider   desonide (DESOWEN) 0.05 % lotion Apply topically. Once in 2 days for rash   Yes Historical Provider   eszopiclone (LUNESTA) 3 mg Tab Take 3 mg by mouth every evening.   Yes Historical Provider   lactulose (CHRONULAC) 10 gram/15 mL solution Take by mouth as needed.   Yes Historical Provider   mometasone 0.1% (ELOCON) 0.1 % cream Apply topically as needed (rash).   Yes Historical Provider   morphine (MSIR) 15 MG tablet Take 15 mg by mouth every 8 (eight) hours as needed for Pain.    Yes Historical Provider   amLODIPine (NORVASC) 5 MG tablet Take 5 mg by mouth nightly as needed.    Historical Provider   diazePAM (VALIUM) 5 MG tablet Take 1 tablet (5 mg total) by mouth On call Procedure for Anxiety. Take 1 pill an hour before MRI and then 1 pill if needed before scan 6/23/20 7/23/20  Shannon Greene MD   aspirin-sod bicarb-citric acid (SHON-SELTZER) 324 mg TbEF Take 325 mg by mouth every 6 (six) hours as needed.  9/24/20  Historical Provider     Scheduled Meds:    ceFAZolin (ANCEF) IVPB  1 g Intravenous Once    famotidine  20 mg Intravenous Once    metronidazole  500 mg Intravenous Once    ondansetron (ZOFRAN) IVPB  16 mg Intravenous Once     Continuous Infusions:     sodium chloride 0.9%       PRN Meds:  Anticoagulants/Antiplatelets: no anticoagulation    Allergies: Review of patient's allergies indicates:  No Known Allergies  Sedation Hx: have not been any systemic reactions    Labs:  Recent Labs   Lab 09/24/20  1047   INR 1.0       Recent Labs   Lab 09/24/20  1047   WBC 8.11   HGB 14.2   HCT 45.4   MCV 95       No results for input(s): GLU, NA, K, CL, CO2, BUN, CREATININE, CALCIUM, MG, ALT, AST, ALBUMIN, BILITOT, BILIDIR in the last 168 hours.      Vitals:  Temp: 97.7 °F (36.5 °C) (09/24/20 1113)  Pulse: 78 (09/24/20 1113)  Resp: 20 (09/24/20 1113)  BP: (!) 165/91 (09/24/20 1114)  SpO2: 97 % (09/24/20 1113)     Physical Exam:      General: no acute distress  Mental Status: alert and oriented to person, place and time  HEENT: normocephalic, atraumatic  Chest: unlabored breathing  Heart: regular heart rate  Abdomen: nondistended  Extremity: moves all extremities    Plan: y90 via left hepatic a collateral  Sedation Plan: moderate    Geovanni Calero MD  Staff Radiologist  Department of Radiology  Pager: [phone]

## 2020-09-25 LAB — AFP SERPL-MCNC: 4.2 NG/ML (ref 0–8.4)

## 2020-11-27 ENCOUNTER — TELEPHONE (OUTPATIENT)
Dept: GASTROENTEROLOGY | Facility: CLINIC | Age: 75
End: 2020-11-27

## 2020-11-27 NOTE — TELEPHONE ENCOUNTER
----- Message from Shannon Greene MD sent at 11/27/2020  1:20 PM CST -----  Contact: [phone]/SELF  Yes  ----- Message -----  From: Crista Triplett MA  Sent: 11/25/2020   8:13 AM CST  To: Shannon Greene MD    Good Morning,    Is it ok for him to take Clindamycin with his liver?  ----- Message -----  From: Sravanthi Santiago LPN  Sent: 11/24/2020   4:58 PM CST  To: Crista Triplett MA      ----- Message -----  From: Chanda Hutchinson RN  Sent: 11/24/2020   4:55 PM CST  To: Crista Triplett MA, Kasey CASTELLANO Staff      ----- Message -----  From: Sravanthi Santiago LPN  Sent: 11/24/2020   4:53 PM CST  To: Crista Triplett MA, Tyson Patrice J Staff      ----- Message -----  From: Daniela Correa  Sent: 11/24/2020   4:25 PM CST  To: Jc WOODWARD Staff    Type:  Needs Medical Advice    Who Called: patient  Symptoms (please be specific):    How long has patient had these symptoms:    Pharmacy name and phone #:    Would the patient rather a call back or a response via [facility]? Call Back  Best Call Back Number: [phone]  Additional Information: patient had teeth pulled and prescribed medication Clindamycin HCL 300mg Capsules and patient would like to be advised on if it is safe  to take this medications because of his liver      Gabriel/JORDAN

## 2020-11-27 NOTE — TELEPHONE ENCOUNTER
Returned call to patient and informed him it was ok to take the Clindamycin. He verbalized understanding.

## 2021-01-08 ENCOUNTER — TELEPHONE (OUTPATIENT)
Dept: GASTROENTEROLOGY | Facility: CLINIC | Age: 76
End: 2021-01-08

## 2021-01-08 DIAGNOSIS — K74.69 OTHER CIRRHOSIS OF LIVER: Primary | ICD-10-CM

## 2021-01-08 DIAGNOSIS — C22.0 HCC (HEPATOCELLULAR CARCINOMA): ICD-10-CM

## 2021-01-19 ENCOUNTER — TELEPHONE (OUTPATIENT)
Dept: HEPATOLOGY | Facility: CLINIC | Age: 76
End: 2021-01-19

## 2021-02-11 ENCOUNTER — TELEPHONE (OUTPATIENT)
Dept: HEPATOLOGY | Facility: CLINIC | Age: 76
End: 2021-02-11

## 2021-02-22 ENCOUNTER — HOSPITAL ENCOUNTER (OUTPATIENT)
Dept: RADIOLOGY | Facility: HOSPITAL | Age: 76
Discharge: HOME | End: 2021-02-22
Attending: INTERNAL MEDICINE
Payer: MEDICARE

## 2021-02-22 DIAGNOSIS — C22.0 HCC (HEPATOCELLULAR CARCINOMA): ICD-10-CM

## 2021-02-22 DIAGNOSIS — K74.69 OTHER CIRRHOSIS OF LIVER: ICD-10-CM

## 2021-02-22 PROCEDURE — 25500020 PHARM REV CODE 255: Performed by: INTERNAL MEDICINE

## 2021-02-22 PROCEDURE — 74183 MRI ABD W/O CNTR FLWD CNTR: CPT | Mod: TC

## 2021-02-22 PROCEDURE — A9585 GADOBUTROL INJECTION: HCPCS | Performed by: INTERNAL MEDICINE

## 2021-02-22 RX ORDER — GADOBUTROL 604.72 MG/ML
10 INJECTION INTRAVENOUS
Status: DISCONTINUED | OUTPATIENT
Start: 2021-02-22 | End: 2021-02-22 | Stop reason: SDUPTHER

## 2021-02-22 RX ORDER — GADOBUTROL 604.72 MG/ML
10 INJECTION INTRAVENOUS
Status: COMPLETED | OUTPATIENT
Start: 2021-02-22 | End: 2021-02-22

## 2021-02-22 RX ADMIN — GADOBUTROL 7 ML: 604.72 INJECTION INTRAVENOUS at 09:02

## 2021-03-03 ENCOUNTER — OFFICE VISIT (OUTPATIENT)
Dept: HEPATOLOGY | Facility: CLINIC | Age: 76
End: 2021-03-03
Payer: MEDICARE

## 2021-03-03 VITALS
BODY MASS INDEX: 23.15 KG/M2 | HEIGHT: 71 IN | SYSTOLIC BLOOD PRESSURE: 130 MMHG | DIASTOLIC BLOOD PRESSURE: 86 MMHG | HEART RATE: 72 BPM | WEIGHT: 165.38 LBS

## 2021-03-03 DIAGNOSIS — K74.69 COMPENSATED HCV CIRRHOSIS: ICD-10-CM

## 2021-03-03 DIAGNOSIS — Z12.11 COLON CANCER SCREENING: ICD-10-CM

## 2021-03-03 DIAGNOSIS — R13.19 ESOPHAGEAL DYSPHAGIA: ICD-10-CM

## 2021-03-03 DIAGNOSIS — F40.240 CLAUSTROPHOBIA: ICD-10-CM

## 2021-03-03 DIAGNOSIS — B19.20 COMPENSATED HCV CIRRHOSIS: ICD-10-CM

## 2021-03-03 DIAGNOSIS — K59.01 SLOW TRANSIT CONSTIPATION: ICD-10-CM

## 2021-03-03 DIAGNOSIS — C22.0 HCC (HEPATOCELLULAR CARCINOMA): Primary | ICD-10-CM

## 2021-03-03 DIAGNOSIS — K21.9 GASTROESOPHAGEAL REFLUX DISEASE, UNSPECIFIED WHETHER ESOPHAGITIS PRESENT: ICD-10-CM

## 2021-03-03 PROCEDURE — 3288F PR FALLS RISK ASSESSMENT DOCUMENTED: ICD-10-PCS | Mod: S$GLB,,, | Performed by: INTERNAL MEDICINE

## 2021-03-03 PROCEDURE — 3075F PR MOST RECENT SYSTOLIC BLOOD PRESS GE 130-139MM HG: ICD-10-PCS | Mod: S$GLB,,, | Performed by: INTERNAL MEDICINE

## 2021-03-03 PROCEDURE — 99999 PR PBB SHADOW E&M-EST. PATIENT-LVL III: CPT | Mod: PBBFAC,,, | Performed by: INTERNAL MEDICINE

## 2021-03-03 PROCEDURE — 99214 PR OFFICE/OUTPT VISIT, EST, LEVL IV, 30-39 MIN: ICD-10-PCS | Mod: S$GLB,,, | Performed by: INTERNAL MEDICINE

## 2021-03-03 PROCEDURE — 99214 OFFICE O/P EST MOD 30 MIN: CPT | Mod: S$GLB,,, | Performed by: INTERNAL MEDICINE

## 2021-03-03 PROCEDURE — 3079F DIAST BP 80-89 MM HG: CPT | Mod: S$GLB,,, | Performed by: INTERNAL MEDICINE

## 2021-03-03 PROCEDURE — 1126F AMNT PAIN NOTED NONE PRSNT: CPT | Mod: S$GLB,,, | Performed by: INTERNAL MEDICINE

## 2021-03-03 PROCEDURE — 3075F SYST BP GE 130 - 139MM HG: CPT | Mod: S$GLB,,, | Performed by: INTERNAL MEDICINE

## 2021-03-03 PROCEDURE — 3079F PR MOST RECENT DIASTOLIC BLOOD PRESSURE 80-89 MM HG: ICD-10-PCS | Mod: S$GLB,,, | Performed by: INTERNAL MEDICINE

## 2021-03-03 PROCEDURE — 3008F PR BODY MASS INDEX (BMI) DOCUMENTED: ICD-10-PCS | Mod: S$GLB,,, | Performed by: INTERNAL MEDICINE

## 2021-03-03 PROCEDURE — 3008F BODY MASS INDEX DOCD: CPT | Mod: S$GLB,,, | Performed by: INTERNAL MEDICINE

## 2021-03-03 PROCEDURE — 1126F PR PAIN SEVERITY QUANTIFIED, NO PAIN PRESENT: ICD-10-PCS | Mod: S$GLB,,, | Performed by: INTERNAL MEDICINE

## 2021-03-03 PROCEDURE — 1159F PR MEDICATION LIST DOCUMENTED IN MEDICAL RECORD: ICD-10-PCS | Mod: S$GLB,,, | Performed by: INTERNAL MEDICINE

## 2021-03-03 PROCEDURE — 3288F FALL RISK ASSESSMENT DOCD: CPT | Mod: S$GLB,,, | Performed by: INTERNAL MEDICINE

## 2021-03-03 PROCEDURE — 1159F MED LIST DOCD IN RCRD: CPT | Mod: S$GLB,,, | Performed by: INTERNAL MEDICINE

## 2021-03-03 PROCEDURE — 99999 PR PBB SHADOW E&M-EST. PATIENT-LVL III: ICD-10-PCS | Mod: PBBFAC,,, | Performed by: INTERNAL MEDICINE

## 2021-03-03 PROCEDURE — 1101F PT FALLS ASSESS-DOCD LE1/YR: CPT | Mod: S$GLB,,, | Performed by: INTERNAL MEDICINE

## 2021-03-03 PROCEDURE — 1101F PR PT FALLS ASSESS DOC 0-1 FALLS W/OUT INJ PAST YR: ICD-10-PCS | Mod: S$GLB,,, | Performed by: INTERNAL MEDICINE

## 2021-03-03 RX ORDER — PANTOPRAZOLE SODIUM 20 MG/1
20 TABLET, DELAYED RELEASE ORAL DAILY
Qty: 30 TABLET | Refills: 5 | Status: SHIPPED | OUTPATIENT
Start: 2021-03-03 | End: 2022-03-03

## 2021-03-03 RX ORDER — DIAZEPAM 5 MG/1
5 TABLET ORAL
Qty: 2 TABLET | Refills: 0 | Status: SHIPPED | OUTPATIENT
Start: 2021-03-03 | End: 2021-06-17 | Stop reason: SDUPTHER

## 2021-03-03 RX ORDER — SODIUM, POTASSIUM,MAG SULFATES 17.5-3.13G
1 SOLUTION, RECONSTITUTED, ORAL ORAL ONCE
Qty: 1 BOTTLE | Refills: 0 | Status: SHIPPED | OUTPATIENT
Start: 2021-03-03 | End: 2021-03-03

## 2021-03-03 RX ORDER — POLYETHYLENE GLYCOL 3350 17 G/17G
17 POWDER, FOR SOLUTION ORAL DAILY
Qty: 235 G | Refills: 1 | Status: SHIPPED | OUTPATIENT
Start: 2021-03-03

## 2021-03-04 ENCOUNTER — TELEPHONE (OUTPATIENT)
Dept: ENDOSCOPY | Facility: HOSPITAL | Age: 76
End: 2021-03-04

## 2021-03-12 DIAGNOSIS — C22.0 HCC (HEPATOCELLULAR CARCINOMA): Primary | ICD-10-CM

## 2021-03-16 ENCOUNTER — TELEPHONE (OUTPATIENT)
Dept: ENDOSCOPY | Facility: HOSPITAL | Age: 76
End: 2021-03-16

## 2021-03-23 ENCOUNTER — HOSPITAL ENCOUNTER (OUTPATIENT)
Dept: RADIOLOGY | Facility: HOSPITAL | Age: 76
Discharge: HOME | End: 2021-03-23
Attending: PHYSICIAN ASSISTANT
Payer: MEDICARE

## 2021-03-23 ENCOUNTER — HOSPITAL ENCOUNTER (OUTPATIENT)
Facility: HOSPITAL | Age: 76
Discharge: HOME | End: 2021-03-23
Attending: RADIOLOGY | Admitting: INTERNAL MEDICINE
Payer: MEDICARE

## 2021-03-23 ENCOUNTER — TELEPHONE (OUTPATIENT)
Dept: RADIOLOGY | Facility: HOSPITAL | Age: 76
End: 2021-03-23

## 2021-03-23 VITALS
HEIGHT: 71 IN | BODY MASS INDEX: 23.1 KG/M2 | OXYGEN SATURATION: 99 % | TEMPERATURE: 98 F | DIASTOLIC BLOOD PRESSURE: 79 MMHG | WEIGHT: 165 LBS | HEART RATE: 70 BPM | RESPIRATION RATE: 12 BRPM | SYSTOLIC BLOOD PRESSURE: 139 MMHG

## 2021-03-23 DIAGNOSIS — C22.0 HCC (HEPATOCELLULAR CARCINOMA): Primary | ICD-10-CM

## 2021-03-23 DIAGNOSIS — C22.0 HCC (HEPATOCELLULAR CARCINOMA): ICD-10-CM

## 2021-03-23 LAB
AFP SERPL-MCNC: 2.8 NG/ML (ref 0–8.4)
ALBUMIN SERPL BCP-MCNC: 3.3 G/DL (ref 3.5–5.2)
ALP SERPL-CCNC: 163 U/L (ref 55–135)
ALT SERPL W/O P-5'-P-CCNC: 16 U/L (ref 10–44)
ANION GAP SERPL CALC-SCNC: 11 MMOL/L (ref 8–16)
APTT BLDCRRT: 27.6 SEC (ref 21–32)
AST SERPL-CCNC: 28 U/L (ref 10–40)
BASOPHILS # BLD AUTO: 0.04 K/UL (ref 0–0.2)
BASOPHILS NFR BLD: 0.6 % (ref 0–1.9)
BILIRUB SERPL-MCNC: 0.8 MG/DL (ref 0.1–1)
BUN SERPL-MCNC: 20 MG/DL (ref 8–23)
CALCIUM SERPL-MCNC: 9.3 MG/DL (ref 8.7–10.5)
CHLORIDE SERPL-SCNC: 101 MMOL/L (ref 95–110)
CO2 SERPL-SCNC: 26 MMOL/L (ref 23–29)
CREAT SERPL-MCNC: 1.6 MG/DL (ref 0.5–1.4)
DIFFERENTIAL METHOD: ABNORMAL
EOSINOPHIL # BLD AUTO: 0.5 K/UL (ref 0–0.5)
EOSINOPHIL NFR BLD: 6.4 % (ref 0–8)
ERYTHROCYTE [DISTWIDTH] IN BLOOD BY AUTOMATED COUNT: 13.8 % (ref 11.5–14.5)
EST. GFR  (AFRICAN AMERICAN): 48 ML/MIN/1.73 M^2
EST. GFR  (NON AFRICAN AMERICAN): 42 ML/MIN/1.73 M^2
GLUCOSE SERPL-MCNC: 93 MG/DL (ref 70–110)
HCT VFR BLD AUTO: 43.2 % (ref 40–54)
HGB BLD-MCNC: 13.7 G/DL (ref 14–18)
IMM GRANULOCYTES # BLD AUTO: 0.05 K/UL (ref 0–0.04)
IMM GRANULOCYTES NFR BLD AUTO: 0.7 % (ref 0–0.5)
INR PPP: 1 (ref 0.8–1.2)
LYMPHOCYTES # BLD AUTO: 0.8 K/UL (ref 1–4.8)
LYMPHOCYTES NFR BLD: 10.8 % (ref 18–48)
MCH RBC QN AUTO: 29.5 PG (ref 27–31)
MCHC RBC AUTO-ENTMCNC: 31.7 G/DL (ref 32–36)
MCV RBC AUTO: 93 FL (ref 82–98)
MONOCYTES # BLD AUTO: 0.7 K/UL (ref 0.3–1)
MONOCYTES NFR BLD: 10.1 % (ref 4–15)
NEUTROPHILS # BLD AUTO: 5.1 K/UL (ref 1.8–7.7)
NEUTROPHILS NFR BLD: 71.4 % (ref 38–73)
NRBC BLD-RTO: 0 /100 WBC
PLATELET # BLD AUTO: 233 K/UL (ref 150–350)
PMV BLD AUTO: 9.4 FL (ref 9.2–12.9)
POTASSIUM SERPL-SCNC: 3.8 MMOL/L (ref 3.5–5.1)
PROT SERPL-MCNC: 8.2 G/DL (ref 6–8.4)
PROTHROMBIN TIME: 10.9 SEC (ref 9–12.5)
RBC # BLD AUTO: 4.64 M/UL (ref 4.6–6.2)
SARS-COV-2 RDRP RESP QL NAA+PROBE: NEGATIVE
SODIUM SERPL-SCNC: 138 MMOL/L (ref 136–145)
WBC # BLD AUTO: 7.16 K/UL (ref 3.9–12.7)

## 2021-03-23 PROCEDURE — C1887 CATHETER, GUIDING: HCPCS | Performed by: RADIOLOGY

## 2021-03-23 PROCEDURE — 82105 ALPHA-FETOPROTEIN SERUM: CPT | Performed by: PHYSICIAN ASSISTANT

## 2021-03-23 PROCEDURE — C1894 INTRO/SHEATH, NON-LASER: HCPCS | Performed by: RADIOLOGY

## 2021-03-23 PROCEDURE — C1769 GUIDE WIRE: HCPCS | Performed by: RADIOLOGY

## 2021-03-23 PROCEDURE — C1760 CLOSURE DEV, VASC: HCPCS | Performed by: RADIOLOGY

## 2021-03-23 PROCEDURE — 25000003 PHARM REV CODE 250: Performed by: RADIOLOGY

## 2021-03-23 PROCEDURE — 80053 COMPREHEN METABOLIC PANEL: CPT | Performed by: PHYSICIAN ASSISTANT

## 2021-03-23 PROCEDURE — 63600175 PHARM REV CODE 636 W HCPCS: Performed by: RADIOLOGY

## 2021-03-23 PROCEDURE — 85025 COMPLETE CBC W/AUTO DIFF WBC: CPT | Performed by: PHYSICIAN ASSISTANT

## 2021-03-23 PROCEDURE — A9540 TC99M MAA: HCPCS

## 2021-03-23 PROCEDURE — 25000003 PHARM REV CODE 250: Performed by: PHYSICIAN ASSISTANT

## 2021-03-23 PROCEDURE — 27800903 OPTIME MED/SURG SUP & DEVICES OTHER IMPLANTS: Performed by: RADIOLOGY

## 2021-03-23 PROCEDURE — 85610 PROTHROMBIN TIME: CPT | Performed by: PHYSICIAN ASSISTANT

## 2021-03-23 PROCEDURE — U0002 COVID-19 LAB TEST NON-CDC: HCPCS | Performed by: RADIOLOGY

## 2021-03-23 PROCEDURE — 63600175 PHARM REV CODE 636 W HCPCS: Performed by: PHYSICIAN ASSISTANT

## 2021-03-23 PROCEDURE — 85730 THROMBOPLASTIN TIME PARTIAL: CPT | Performed by: PHYSICIAN ASSISTANT

## 2021-03-23 PROCEDURE — 27201423 OPTIME MED/SURG SUP & DEVICES STERILE SUPPLY: Performed by: RADIOLOGY

## 2021-03-23 PROCEDURE — 78201 LIVER IMAGING STATIC ONLY: CPT | Mod: TC

## 2021-03-23 DEVICE — ANGIO-SEAL VIP VASCULAR CLOSURE DEVICE
Type: IMPLANTABLE DEVICE | Site: LIVER | Status: FUNCTIONAL
Brand: ANGIO-SEAL

## 2021-03-23 DEVICE — COIL EMB DETACH AZUR CX 2X4: Type: IMPLANTABLE DEVICE | Site: LIVER | Status: FUNCTIONAL

## 2021-03-23 RX ORDER — DIPHENHYDRAMINE HYDROCHLORIDE 50 MG/ML
25 INJECTION INTRAMUSCULAR; INTRAVENOUS ONCE
Status: COMPLETED | OUTPATIENT
Start: 2021-03-23 | End: 2021-03-23

## 2021-03-23 RX ORDER — CEFAZOLIN SODIUM 1 G/50ML
1 SOLUTION INTRAVENOUS ONCE
Status: COMPLETED | OUTPATIENT
Start: 2021-03-23 | End: 2021-03-23

## 2021-03-23 RX ORDER — MIDAZOLAM HYDROCHLORIDE 1 MG/ML
INJECTION INTRAMUSCULAR; INTRAVENOUS
Status: COMPLETED | OUTPATIENT
Start: 2021-03-23 | End: 2021-03-23

## 2021-03-23 RX ORDER — FENTANYL CITRATE 50 UG/ML
INJECTION, SOLUTION INTRAMUSCULAR; INTRAVENOUS
Status: COMPLETED | OUTPATIENT
Start: 2021-03-23 | End: 2021-03-23

## 2021-03-23 RX ORDER — FAMOTIDINE 20 MG/50ML
20 INJECTION, SOLUTION INTRAVENOUS ONCE
Status: DISCONTINUED | OUTPATIENT
Start: 2021-03-23 | End: 2021-03-23 | Stop reason: HOSPADM

## 2021-03-23 RX ORDER — METRONIDAZOLE 500 MG/100ML
500 INJECTION, SOLUTION INTRAVENOUS ONCE
Status: DISCONTINUED | OUTPATIENT
Start: 2021-03-23 | End: 2021-03-23 | Stop reason: HOSPADM

## 2021-03-23 RX ORDER — SODIUM CHLORIDE 9 MG/ML
INJECTION, SOLUTION INTRAVENOUS CONTINUOUS
Status: DISCONTINUED | OUTPATIENT
Start: 2021-03-23 | End: 2021-03-23 | Stop reason: HOSPADM

## 2021-03-23 RX ADMIN — SODIUM CHLORIDE: 0.9 INJECTION, SOLUTION INTRAVENOUS at 10:03

## 2021-03-23 RX ADMIN — HYDROCORTISONE SODIUM SUCCINATE 200 MG: 100 INJECTION, POWDER, FOR SOLUTION INTRAMUSCULAR; INTRAVENOUS at 10:03

## 2021-03-23 RX ADMIN — DIPHENHYDRAMINE HYDROCHLORIDE 25 MG: 50 INJECTION, SOLUTION INTRAMUSCULAR; INTRAVENOUS at 10:03

## 2021-03-23 RX ADMIN — FENTANYL CITRATE 50 MCG: 50 INJECTION, SOLUTION INTRAMUSCULAR; INTRAVENOUS at 11:03

## 2021-03-23 RX ADMIN — MIDAZOLAM HYDROCHLORIDE 1 MG: 1 INJECTION INTRAMUSCULAR; INTRAVENOUS at 11:03

## 2021-03-23 RX ADMIN — SODIUM CHLORIDE: 0.9 INJECTION, SOLUTION INTRAVENOUS at 01:03

## 2021-03-23 RX ADMIN — CEFAZOLIN SODIUM 1 G: 1 SOLUTION INTRAVENOUS at 11:03

## 2021-03-23 RX ADMIN — MIDAZOLAM HYDROCHLORIDE 1 MG: 1 INJECTION INTRAMUSCULAR; INTRAVENOUS at 12:03

## 2021-03-23 RX ADMIN — ONDANSETRON 16 MG: 2 INJECTION INTRAMUSCULAR; INTRAVENOUS at 11:03

## 2021-03-23 RX ADMIN — FENTANYL CITRATE 50 MCG: 50 INJECTION, SOLUTION INTRAMUSCULAR; INTRAVENOUS at 12:03

## 2021-03-31 ENCOUNTER — HOSPITAL ENCOUNTER (OUTPATIENT)
Facility: HOSPITAL | Age: 76
Discharge: HOME | End: 2021-03-31
Attending: RADIOLOGY | Admitting: INTERNAL MEDICINE
Payer: MEDICARE

## 2021-03-31 ENCOUNTER — HOSPITAL ENCOUNTER (OUTPATIENT)
Dept: RADIOLOGY | Facility: HOSPITAL | Age: 76
Discharge: HOME | End: 2021-03-31
Attending: PHYSICIAN ASSISTANT
Payer: MEDICARE

## 2021-03-31 DIAGNOSIS — C22.0 HCC (HEPATOCELLULAR CARCINOMA): ICD-10-CM

## 2021-03-31 LAB
ALBUMIN SERPL BCP-MCNC: 3.1 G/DL (ref 3.5–5.2)
ALP SERPL-CCNC: 166 U/L (ref 55–135)
ALT SERPL W/O P-5'-P-CCNC: 21 U/L (ref 10–44)
ANION GAP SERPL CALC-SCNC: 8 MMOL/L (ref 8–16)
AST SERPL-CCNC: 32 U/L (ref 10–40)
BILIRUB SERPL-MCNC: 0.9 MG/DL (ref 0.1–1)
BUN SERPL-MCNC: 19 MG/DL (ref 8–23)
CALCIUM SERPL-MCNC: 9 MG/DL (ref 8.7–10.5)
CHLORIDE SERPL-SCNC: 103 MMOL/L (ref 95–110)
CO2 SERPL-SCNC: 25 MMOL/L (ref 23–29)
CREAT SERPL-MCNC: 1.5 MG/DL (ref 0.5–1.4)
EST. GFR  (AFRICAN AMERICAN): 52 ML/MIN/1.73 M^2
EST. GFR  (NON AFRICAN AMERICAN): 45 ML/MIN/1.73 M^2
GLUCOSE SERPL-MCNC: 87 MG/DL (ref 70–110)
POTASSIUM SERPL-SCNC: 4.2 MMOL/L (ref 3.5–5.1)
PROT SERPL-MCNC: 7.7 G/DL (ref 6–8.4)
SARS-COV-2 RDRP RESP QL NAA+PROBE: NEGATIVE
SODIUM SERPL-SCNC: 136 MMOL/L (ref 136–145)

## 2021-03-31 PROCEDURE — 78201 LIVER IMAGING STATIC ONLY: CPT | Mod: TC

## 2021-03-31 PROCEDURE — U0002 COVID-19 LAB TEST NON-CDC: HCPCS | Performed by: RADIOLOGY

## 2021-03-31 PROCEDURE — 82105 ALPHA-FETOPROTEIN SERUM: CPT | Performed by: PHYSICIAN ASSISTANT

## 2021-03-31 PROCEDURE — C1760 CLOSURE DEV, VASC: HCPCS | Performed by: RADIOLOGY

## 2021-03-31 PROCEDURE — 25000003 PHARM REV CODE 250: Performed by: PHYSICIAN ASSISTANT

## 2021-03-31 PROCEDURE — 63600175 PHARM REV CODE 636 W HCPCS: Performed by: PHYSICIAN ASSISTANT

## 2021-03-31 PROCEDURE — C1769 GUIDE WIRE: HCPCS | Performed by: RADIOLOGY

## 2021-03-31 PROCEDURE — S0030 INJECTION, METRONIDAZOLE: HCPCS | Performed by: PHYSICIAN ASSISTANT

## 2021-03-31 PROCEDURE — C2616 BRACHYTX, NON-STR,YTTRIUM-90: HCPCS | Mod: 59 | Performed by: RADIOLOGY

## 2021-03-31 PROCEDURE — 80053 COMPREHEN METABOLIC PANEL: CPT | Performed by: RADIOLOGY

## 2021-03-31 PROCEDURE — 25000003 PHARM REV CODE 250: Performed by: RADIOLOGY

## 2021-03-31 PROCEDURE — 63600175 PHARM REV CODE 636 W HCPCS: Performed by: RADIOLOGY

## 2021-03-31 PROCEDURE — S0028 INJECTION, FAMOTIDINE, 20 MG: HCPCS | Performed by: PHYSICIAN ASSISTANT

## 2021-03-31 DEVICE — THERASPHERE Y90: Type: IMPLANTABLE DEVICE | Site: LIVER | Status: FUNCTIONAL

## 2021-03-31 DEVICE — ANGIO-SEAL VIP VASCULAR CLOSURE DEVICE
Type: IMPLANTABLE DEVICE | Site: LIVER | Status: FUNCTIONAL
Brand: ANGIO-SEAL

## 2021-03-31 RX ORDER — MIDAZOLAM HYDROCHLORIDE 1 MG/ML
INJECTION INTRAMUSCULAR; INTRAVENOUS
Status: COMPLETED | OUTPATIENT
Start: 2021-03-31 | End: 2021-03-31

## 2021-03-31 RX ORDER — CEFAZOLIN SODIUM 1 G/50ML
1 SOLUTION INTRAVENOUS ONCE
Status: COMPLETED | OUTPATIENT
Start: 2021-03-31 | End: 2021-03-31

## 2021-03-31 RX ORDER — FAMOTIDINE 20 MG/50ML
20 INJECTION, SOLUTION INTRAVENOUS ONCE
Status: COMPLETED | OUTPATIENT
Start: 2021-03-31 | End: 2021-03-31

## 2021-03-31 RX ORDER — METRONIDAZOLE 500 MG/100ML
500 INJECTION, SOLUTION INTRAVENOUS ONCE
Status: COMPLETED | OUTPATIENT
Start: 2021-03-31 | End: 2021-03-31

## 2021-03-31 RX ORDER — SODIUM CHLORIDE 9 MG/ML
INJECTION, SOLUTION INTRAVENOUS CONTINUOUS
Status: DISCONTINUED | OUTPATIENT
Start: 2021-03-31 | End: 2021-03-31 | Stop reason: HOSPADM

## 2021-03-31 RX ORDER — DIPHENHYDRAMINE HYDROCHLORIDE 50 MG/ML
25 INJECTION INTRAMUSCULAR; INTRAVENOUS ONCE
Status: COMPLETED | OUTPATIENT
Start: 2021-03-31 | End: 2021-03-31

## 2021-03-31 RX ORDER — ONDANSETRON 2 MG/ML
4 INJECTION INTRAMUSCULAR; INTRAVENOUS ONCE AS NEEDED
Status: DISCONTINUED | OUTPATIENT
Start: 2021-03-31 | End: 2021-03-31 | Stop reason: HOSPADM

## 2021-03-31 RX ORDER — FENTANYL CITRATE 50 UG/ML
INJECTION, SOLUTION INTRAMUSCULAR; INTRAVENOUS
Status: COMPLETED | OUTPATIENT
Start: 2021-03-31 | End: 2021-03-31

## 2021-03-31 RX ADMIN — MIDAZOLAM HYDROCHLORIDE 1 MG: 1 INJECTION INTRAMUSCULAR; INTRAVENOUS at 01:03

## 2021-03-31 RX ADMIN — ONDANSETRON 16 MG: 2 INJECTION INTRAMUSCULAR; INTRAVENOUS at 12:03

## 2021-03-31 RX ADMIN — FENTANYL CITRATE 50 MCG: 50 INJECTION, SOLUTION INTRAMUSCULAR; INTRAVENOUS at 01:03

## 2021-03-31 RX ADMIN — HYDROCORTISONE SODIUM SUCCINATE 200 MG: 100 INJECTION, POWDER, FOR SOLUTION INTRAMUSCULAR; INTRAVENOUS at 12:03

## 2021-03-31 RX ADMIN — FAMOTIDINE 20 MG: 20 INJECTION, SOLUTION INTRAVENOUS at 12:03

## 2021-03-31 RX ADMIN — METRONIDAZOLE 500 MG: 500 INJECTION, SOLUTION INTRAVENOUS at 12:03

## 2021-03-31 RX ADMIN — DIPHENHYDRAMINE HYDROCHLORIDE 25 MG: 50 INJECTION, SOLUTION INTRAMUSCULAR; INTRAVENOUS at 12:03

## 2021-03-31 RX ADMIN — CEFAZOLIN SODIUM 1 G: 1 SOLUTION INTRAVENOUS at 12:03

## 2021-03-31 RX ADMIN — SODIUM CHLORIDE: 0.9 INJECTION, SOLUTION INTRAVENOUS at 02:03

## 2021-03-31 RX ADMIN — SODIUM CHLORIDE: 0.9 INJECTION, SOLUTION INTRAVENOUS at 12:03

## 2021-04-01 VITALS
SYSTOLIC BLOOD PRESSURE: 162 MMHG | HEIGHT: 71 IN | WEIGHT: 163 LBS | HEART RATE: 66 BPM | BODY MASS INDEX: 22.82 KG/M2 | OXYGEN SATURATION: 100 % | RESPIRATION RATE: 16 BRPM | DIASTOLIC BLOOD PRESSURE: 84 MMHG | TEMPERATURE: 97 F

## 2021-04-01 LAB — AFP SERPL-MCNC: 3 NG/ML (ref 0–8.4)

## 2021-04-12 ENCOUNTER — NURSE TRIAGE (OUTPATIENT)
Dept: ADMINISTRATIVE | Facility: CLINIC | Age: 76
End: 2021-04-12

## 2021-04-20 ENCOUNTER — TELEPHONE (OUTPATIENT)
Dept: HEPATOLOGY | Facility: CLINIC | Age: 76
End: 2021-04-20

## 2021-06-17 ENCOUNTER — TELEPHONE (OUTPATIENT)
Dept: HEPATOLOGY | Facility: CLINIC | Age: 76
End: 2021-06-17

## 2021-06-17 DIAGNOSIS — C22.0 HCC (HEPATOCELLULAR CARCINOMA): ICD-10-CM

## 2021-06-17 DIAGNOSIS — F40.240 CLAUSTROPHOBIA: ICD-10-CM

## 2021-06-17 RX ORDER — DIAZEPAM 5 MG/1
5 TABLET ORAL
Qty: 2 TABLET | Refills: 0 | Status: SHIPPED | OUTPATIENT
Start: 2021-06-17 | End: 2021-06-24 | Stop reason: SDUPTHER

## 2021-06-24 DIAGNOSIS — C22.0 HCC (HEPATOCELLULAR CARCINOMA): ICD-10-CM

## 2021-06-24 DIAGNOSIS — F40.240 CLAUSTROPHOBIA: ICD-10-CM

## 2021-06-24 RX ORDER — DIAZEPAM 5 MG/1
5 TABLET ORAL
Qty: 2 TABLET | Refills: 0 | Status: SHIPPED | OUTPATIENT
Start: 2021-06-24 | End: 2021-07-08

## 2021-07-08 ENCOUNTER — TELEPHONE (OUTPATIENT)
Dept: HEPATOLOGY | Facility: CLINIC | Age: 76
End: 2021-07-08

## 2021-07-09 DIAGNOSIS — F41.9 ANXIETY: Primary | ICD-10-CM

## 2021-07-09 RX ORDER — DIAZEPAM 5 MG/1
5 TABLET ORAL
Qty: 2 TABLET | Refills: 0 | Status: SHIPPED | OUTPATIENT
Start: 2021-07-09 | End: 2021-10-18

## 2021-07-12 ENCOUNTER — TELEPHONE (OUTPATIENT)
Dept: HEPATOLOGY | Facility: CLINIC | Age: 76
End: 2021-07-12

## 2021-07-15 ENCOUNTER — HOSPITAL ENCOUNTER (OUTPATIENT)
Dept: RADIOLOGY | Facility: HOSPITAL | Age: 76
Discharge: HOME | End: 2021-07-15
Attending: INTERNAL MEDICINE
Payer: MEDICARE

## 2021-07-15 DIAGNOSIS — C22.0 HCC (HEPATOCELLULAR CARCINOMA): ICD-10-CM

## 2021-07-15 PROCEDURE — A9585 GADOBUTROL INJECTION: HCPCS | Performed by: INTERNAL MEDICINE

## 2021-07-15 PROCEDURE — 74183 MRI ABD W/O CNTR FLWD CNTR: CPT | Mod: TC

## 2021-07-15 PROCEDURE — 25500020 PHARM REV CODE 255: Performed by: INTERNAL MEDICINE

## 2021-07-15 RX ORDER — GADOBUTROL 604.72 MG/ML
10 INJECTION INTRAVENOUS
Status: COMPLETED | OUTPATIENT
Start: 2021-07-15 | End: 2021-07-15

## 2021-07-15 RX ADMIN — GADOBUTROL 7 ML: 604.72 INJECTION INTRAVENOUS at 12:07

## 2021-07-19 DIAGNOSIS — K74.69 OTHER CIRRHOSIS OF LIVER: Primary | ICD-10-CM

## 2021-07-19 DIAGNOSIS — C22.0 HEPATOCELLULAR CARCINOMA: ICD-10-CM

## 2021-07-20 ENCOUNTER — TELEPHONE (OUTPATIENT)
Dept: HEPATOLOGY | Facility: CLINIC | Age: 76
End: 2021-07-20

## 2021-10-13 ENCOUNTER — TELEPHONE (OUTPATIENT)
Dept: GASTROENTEROLOGY | Facility: CLINIC | Age: 76
End: 2021-10-13
Payer: MEDICARE

## 2021-10-18 ENCOUNTER — TELEPHONE (OUTPATIENT)
Dept: GASTROENTEROLOGY | Facility: CLINIC | Age: 76
End: 2021-10-18

## 2021-10-18 RX ORDER — DIAZEPAM 5 MG/1
5 TABLET ORAL ONCE
Qty: 1 TABLET | Refills: 0 | Status: SHIPPED | OUTPATIENT
Start: 2021-10-18 | End: 2022-01-31

## 2021-10-19 ENCOUNTER — HOSPITAL ENCOUNTER (OUTPATIENT)
Dept: RADIOLOGY | Facility: HOSPITAL | Age: 76
Discharge: HOME | End: 2021-10-19
Attending: INTERNAL MEDICINE
Payer: MEDICARE

## 2021-10-19 DIAGNOSIS — C22.0 HEPATOCELLULAR CARCINOMA: ICD-10-CM

## 2021-10-19 PROCEDURE — A9585 GADOBUTROL INJECTION: HCPCS | Performed by: INTERNAL MEDICINE

## 2021-10-19 PROCEDURE — 74183 MRI ABD W/O CNTR FLWD CNTR: CPT | Mod: TC

## 2021-10-19 PROCEDURE — 25500020 PHARM REV CODE 255: Performed by: INTERNAL MEDICINE

## 2021-10-19 RX ORDER — GADOBUTROL 604.72 MG/ML
10 INJECTION INTRAVENOUS
Status: COMPLETED | OUTPATIENT
Start: 2021-10-19 | End: 2021-10-19

## 2021-10-19 RX ADMIN — GADOBUTROL 7 ML: 604.72 INJECTION INTRAVENOUS at 10:10

## 2021-10-27 ENCOUNTER — OFFICE VISIT (OUTPATIENT)
Dept: HEPATOLOGY | Facility: CLINIC | Age: 76
End: 2021-10-27
Payer: MEDICARE

## 2021-10-27 VITALS
WEIGHT: 150.56 LBS | HEIGHT: 71 IN | HEART RATE: 102 BPM | OXYGEN SATURATION: 93 % | DIASTOLIC BLOOD PRESSURE: 84 MMHG | SYSTOLIC BLOOD PRESSURE: 140 MMHG | BODY MASS INDEX: 21.08 KG/M2

## 2021-10-27 DIAGNOSIS — K76.9 LIVER DISEASE, UNSPECIFIED: ICD-10-CM

## 2021-10-27 DIAGNOSIS — K74.69 OTHER CIRRHOSIS OF LIVER: Primary | ICD-10-CM

## 2021-10-27 DIAGNOSIS — K74.69 COMPENSATED HCV CIRRHOSIS: ICD-10-CM

## 2021-10-27 DIAGNOSIS — C22.0 HEPATOCELLULAR CARCINOMA: ICD-10-CM

## 2021-10-27 DIAGNOSIS — B19.20 COMPENSATED HCV CIRRHOSIS: ICD-10-CM

## 2021-10-27 PROCEDURE — 1159F PR MEDICATION LIST DOCUMENTED IN MEDICAL RECORD: ICD-10-PCS | Mod: S$GLB,,, | Performed by: NURSE PRACTITIONER

## 2021-10-27 PROCEDURE — 1101F PR PT FALLS ASSESS DOC 0-1 FALLS W/OUT INJ PAST YR: ICD-10-PCS | Mod: S$GLB,,, | Performed by: NURSE PRACTITIONER

## 2021-10-27 PROCEDURE — 3079F PR MOST RECENT DIASTOLIC BLOOD PRESSURE 80-89 MM HG: ICD-10-PCS | Mod: S$GLB,,, | Performed by: NURSE PRACTITIONER

## 2021-10-27 PROCEDURE — 99214 PR OFFICE/OUTPT VISIT, EST, LEVL IV, 30-39 MIN: ICD-10-PCS | Mod: S$GLB,,, | Performed by: NURSE PRACTITIONER

## 2021-10-27 PROCEDURE — 1101F PT FALLS ASSESS-DOCD LE1/YR: CPT | Mod: S$GLB,,, | Performed by: NURSE PRACTITIONER

## 2021-10-27 PROCEDURE — 3288F PR FALLS RISK ASSESSMENT DOCUMENTED: ICD-10-PCS | Mod: S$GLB,,, | Performed by: NURSE PRACTITIONER

## 2021-10-27 PROCEDURE — 1159F MED LIST DOCD IN RCRD: CPT | Mod: S$GLB,,, | Performed by: NURSE PRACTITIONER

## 2021-10-27 PROCEDURE — 1126F PR PAIN SEVERITY QUANTIFIED, NO PAIN PRESENT: ICD-10-PCS | Mod: S$GLB,,, | Performed by: NURSE PRACTITIONER

## 2021-10-27 PROCEDURE — 3288F FALL RISK ASSESSMENT DOCD: CPT | Mod: S$GLB,,, | Performed by: NURSE PRACTITIONER

## 2021-10-27 PROCEDURE — 99999 PR PBB SHADOW E&M-EST. PATIENT-LVL IV: ICD-10-PCS | Mod: PBBFAC,,, | Performed by: NURSE PRACTITIONER

## 2021-10-27 PROCEDURE — 3077F SYST BP >= 140 MM HG: CPT | Mod: S$GLB,,, | Performed by: NURSE PRACTITIONER

## 2021-10-27 PROCEDURE — 3077F PR MOST RECENT SYSTOLIC BLOOD PRESSURE >= 140 MM HG: ICD-10-PCS | Mod: S$GLB,,, | Performed by: NURSE PRACTITIONER

## 2021-10-27 PROCEDURE — 99214 OFFICE O/P EST MOD 30 MIN: CPT | Mod: S$GLB,,, | Performed by: NURSE PRACTITIONER

## 2021-10-27 PROCEDURE — 99999 PR PBB SHADOW E&M-EST. PATIENT-LVL IV: CPT | Mod: PBBFAC,,, | Performed by: NURSE PRACTITIONER

## 2021-10-27 PROCEDURE — 3079F DIAST BP 80-89 MM HG: CPT | Mod: S$GLB,,, | Performed by: NURSE PRACTITIONER

## 2021-10-27 PROCEDURE — 1126F AMNT PAIN NOTED NONE PRSNT: CPT | Mod: S$GLB,,, | Performed by: NURSE PRACTITIONER

## 2021-10-27 RX ORDER — BUDESONIDE AND FORMOTEROL FUMARATE DIHYDRATE 80; 4.5 UG/1; UG/1
AEROSOL RESPIRATORY (INHALATION)
COMMUNITY
Start: 2021-05-24

## 2022-01-31 ENCOUNTER — TELEPHONE (OUTPATIENT)
Dept: HEPATOLOGY | Facility: CLINIC | Age: 77
End: 2022-01-31
Payer: MEDICARE

## 2022-01-31 ENCOUNTER — TELEPHONE (OUTPATIENT)
Dept: GASTROENTEROLOGY | Facility: CLINIC | Age: 77
End: 2022-01-31
Payer: MEDICARE

## 2022-01-31 DIAGNOSIS — K76.9 LIVER DISEASE, UNSPECIFIED: Primary | ICD-10-CM

## 2022-01-31 RX ORDER — DIAZEPAM 5 MG/1
TABLET ORAL
Qty: 2 TABLET | Refills: 0 | Status: SHIPPED | OUTPATIENT
Start: 2022-01-31 | End: 2022-02-02 | Stop reason: SDUPTHER

## 2022-01-31 NOTE — TELEPHONE ENCOUNTER
Returned call and informed patient that I would have his Valium sent in to his pharmacy. Verified patients pharmacy was at [address] [place] [address]. Patient verbalized that was the correct pharmacy. Message sent to provider to order and will fax prescription

## 2022-01-31 NOTE — TELEPHONE ENCOUNTER
----- Message from Stacy Peña MA sent at 1/28/2022  4:00 PM CST -----  Regarding: FW: ASAP Creatinine    ----- Message -----  From: RT Greg  Sent: 1/28/2022   7:04 AM CST  To: Kasey Whyte Staff  Subject: ASAP Creatinine                                  Can someone order an ASAP Creatinine and schedule it an hour before his MRI appt Tuesday at 10 am please?  Thanks.

## 2022-01-31 NOTE — TELEPHONE ENCOUNTER
----- Message from Betzy Camilo, RT sent at 1/31/2022  3:21 PM CST -----  Regarding: RADIOLOGY  Pt said he requested to have medication called into the [facility] pharmacy, so he can take prior to having his MRI, patient says nothing has been called in and he  scheduled for tomorrow , can someone please reach out to patient about his medication other than that he can not take MRI ,,, Thanks

## 2022-01-31 NOTE — TELEPHONE ENCOUNTER
----- Message from Crista Triplett MA sent at 1/31/2022  3:28 PM CST -----  Regarding: FW: RADIOLOGY  Can you please order and I can fax it     Thanks   ----- Message -----  From: RT Kael  Sent: 1/31/2022   3:24 PM CST  To: Kasey CASTELLANO Staff  Subject: RADIOLOGY                                        Pt said he requested to have medication called into the [facility] pharmacy, so he can take prior to having his MRI, patient says nothing has been called in and he  scheduled for tomorrow , can someone please reach out to patient about his medication other than that he can not take MRI ,,, Thanks

## 2022-01-31 NOTE — TELEPHONE ENCOUNTER
----- Message from Tanmay Gandhi sent at 1/31/2022  4:46 PM CST -----  Contact: PT  Pt is calling with questions about his MRI. He is missing his medicine to avoid his claustrophobia. Call back at [phone]

## 2022-02-01 ENCOUNTER — HOSPITAL ENCOUNTER (OUTPATIENT)
Dept: RADIOLOGY | Facility: HOSPITAL | Age: 77
Discharge: HOME | End: 2022-02-01
Attending: NURSE PRACTITIONER
Payer: MEDICARE

## 2022-02-01 PROCEDURE — 74183 MRI ABD W/O CNTR FLWD CNTR: CPT | Mod: TC

## 2022-02-01 PROCEDURE — 74183 MRI ABDOMEN W WO CONTRAST: ICD-10-PCS | Mod: 26,,, | Performed by: RADIOLOGY

## 2022-02-01 PROCEDURE — A9585 GADOBUTROL INJECTION: HCPCS | Performed by: NURSE PRACTITIONER

## 2022-02-01 PROCEDURE — 25500020 PHARM REV CODE 255: Performed by: NURSE PRACTITIONER

## 2022-02-01 PROCEDURE — 74183 MRI ABD W/O CNTR FLWD CNTR: CPT | Mod: 26,,, | Performed by: RADIOLOGY

## 2022-02-01 RX ORDER — GADOBUTROL 604.72 MG/ML
6.5 INJECTION INTRAVENOUS
Status: COMPLETED | OUTPATIENT
Start: 2022-02-01 | End: 2022-02-01

## 2022-02-01 RX ADMIN — GADOBUTROL 6.5 ML: 604.72 INJECTION INTRAVENOUS at 10:02

## 2022-02-02 ENCOUNTER — OFFICE VISIT (OUTPATIENT)
Dept: HEPATOLOGY | Facility: CLINIC | Age: 77
End: 2022-02-02
Payer: MEDICARE

## 2022-02-02 VITALS
HEART RATE: 76 BPM | HEIGHT: 71 IN | BODY MASS INDEX: 20.61 KG/M2 | WEIGHT: 147.25 LBS | SYSTOLIC BLOOD PRESSURE: 122 MMHG | DIASTOLIC BLOOD PRESSURE: 70 MMHG

## 2022-02-02 DIAGNOSIS — K74.69 OTHER CIRRHOSIS OF LIVER: Primary | ICD-10-CM

## 2022-02-02 DIAGNOSIS — B19.20 COMPENSATED HCV CIRRHOSIS: ICD-10-CM

## 2022-02-02 DIAGNOSIS — C22.0 HEPATOCELLULAR CARCINOMA: ICD-10-CM

## 2022-02-02 DIAGNOSIS — R93.2 ABNORMAL FINDINGS ON DIAGNOSTIC IMAGING OF LIVER AND BILIARY TRACT: ICD-10-CM

## 2022-02-02 DIAGNOSIS — K74.69 COMPENSATED HCV CIRRHOSIS: ICD-10-CM

## 2022-02-02 DIAGNOSIS — K76.9 LIVER DISEASE, UNSPECIFIED: ICD-10-CM

## 2022-02-02 PROCEDURE — 1159F PR MEDICATION LIST DOCUMENTED IN MEDICAL RECORD: ICD-10-PCS | Mod: CPTII,S$GLB,, | Performed by: NURSE PRACTITIONER

## 2022-02-02 PROCEDURE — 3074F PR MOST RECENT SYSTOLIC BLOOD PRESSURE < 130 MM HG: ICD-10-PCS | Mod: CPTII,S$GLB,, | Performed by: NURSE PRACTITIONER

## 2022-02-02 PROCEDURE — 99999 PR PBB SHADOW E&M-EST. PATIENT-LVL IV: CPT | Mod: PBBFAC,,, | Performed by: NURSE PRACTITIONER

## 2022-02-02 PROCEDURE — 3078F DIAST BP <80 MM HG: CPT | Mod: CPTII,S$GLB,, | Performed by: NURSE PRACTITIONER

## 2022-02-02 PROCEDURE — 99214 OFFICE O/P EST MOD 30 MIN: CPT | Mod: S$GLB,,, | Performed by: NURSE PRACTITIONER

## 2022-02-02 PROCEDURE — 1101F PT FALLS ASSESS-DOCD LE1/YR: CPT | Mod: CPTII,S$GLB,, | Performed by: NURSE PRACTITIONER

## 2022-02-02 PROCEDURE — 3288F FALL RISK ASSESSMENT DOCD: CPT | Mod: CPTII,S$GLB,, | Performed by: NURSE PRACTITIONER

## 2022-02-02 PROCEDURE — 1126F AMNT PAIN NOTED NONE PRSNT: CPT | Mod: CPTII,S$GLB,, | Performed by: NURSE PRACTITIONER

## 2022-02-02 PROCEDURE — 3074F SYST BP LT 130 MM HG: CPT | Mod: CPTII,S$GLB,, | Performed by: NURSE PRACTITIONER

## 2022-02-02 PROCEDURE — 1159F MED LIST DOCD IN RCRD: CPT | Mod: CPTII,S$GLB,, | Performed by: NURSE PRACTITIONER

## 2022-02-02 PROCEDURE — 3288F PR FALLS RISK ASSESSMENT DOCUMENTED: ICD-10-PCS | Mod: CPTII,S$GLB,, | Performed by: NURSE PRACTITIONER

## 2022-02-02 PROCEDURE — 99999 PR PBB SHADOW E&M-EST. PATIENT-LVL IV: ICD-10-PCS | Mod: PBBFAC,,, | Performed by: NURSE PRACTITIONER

## 2022-02-02 PROCEDURE — 3078F PR MOST RECENT DIASTOLIC BLOOD PRESSURE < 80 MM HG: ICD-10-PCS | Mod: CPTII,S$GLB,, | Performed by: NURSE PRACTITIONER

## 2022-02-02 PROCEDURE — 1101F PR PT FALLS ASSESS DOC 0-1 FALLS W/OUT INJ PAST YR: ICD-10-PCS | Mod: CPTII,S$GLB,, | Performed by: NURSE PRACTITIONER

## 2022-02-02 PROCEDURE — 99214 PR OFFICE/OUTPT VISIT, EST, LEVL IV, 30-39 MIN: ICD-10-PCS | Mod: S$GLB,,, | Performed by: NURSE PRACTITIONER

## 2022-02-02 PROCEDURE — 1126F PR PAIN SEVERITY QUANTIFIED, NO PAIN PRESENT: ICD-10-PCS | Mod: CPTII,S$GLB,, | Performed by: NURSE PRACTITIONER

## 2022-02-02 RX ORDER — DIAZEPAM 5 MG/1
TABLET ORAL
Qty: 2 TABLET | Refills: 0 | Status: SHIPPED | OUTPATIENT
Start: 2022-02-02

## 2022-02-02 NOTE — PROGRESS NOTES
Clinic Follow Up:  [facility] Follow Up Note    Reason for Follow Up:  The primary encounter diagnosis was Other cirrhosis of liver. Diagnoses of Liver disease, unspecified, Hepatocellular carcinoma, Compensated HCV cirrhosis, and Abnormal findings on diagnostic imaging of liver and biliary tract  were also pertinent to this visit.    PCP: Blake Escobar       HPI:  This is a 76 y.o. male here for follow up of the above  Pt states that he has been feeling overall stable without any new complaints.   Has had a recent fall and is waiting for further evaluation with neurology for some numbness in his arm.   Recent labs are stable.  AFP WNL  MRI shows stable post-treatment findings without any new concerning areas for HCC.   No upper GI bleeding.  No ascites or BLE.  No overt confusion.      Review of Systems   Constitutional: Negative for chills, fever, malaise/fatigue and weight loss.   Respiratory: Negative for cough.    Cardiovascular: Negative for chest pain.   Gastrointestinal:        Per HPI   Musculoskeletal: Negative for myalgias.   Skin: Negative for itching and rash.   Neurological: Negative for headaches.   Psychiatric/Behavioral: The patient is not nervous/anxious.        Medical History:  Past Medical History:   Diagnosis Date    Cancer     prostate, bladder, and ear    Fibrosis lung     GERD (gastroesophageal reflux disease)     Hepatitis C     tx'd in 2016 w/ Harvoni - achieved SVR    Neuromuscular disorder        Surgical History:   Past Surgical History:   Procedure Laterality Date    BLADDER SURGERY      cancer removal    CAROTID ENDARTERECTOMY Right     COMPUTED TOMOGRAPHY N/A 6/18/2019    Procedure: CT (COMPUTED TOMOGRAPHY);  Surgeon: Christos Diagnostic Provider;  Location: [facility] OR;  Service: General;  Laterality: N/A;  Microwave ablation of liver to be done in CT  Need CRNA and cart    COMPUTED TOMOGRAPHY N/A 9/10/2019    Procedure: CT (COMPUTED TOMOGRAPHY);  Surgeon:  [facility] Diagnostic Provider;  Location: [facility] OR;  Service: General;  Laterality: N/A;  Microwave ablation liver to be done in CT  Need CRNA and cart    FLUOROSCOPY N/A 1/31/2019    Procedure: Y90 POST;  Surgeon: Geovanni Calero MD;  Location: [facility] CATH LAB;  Service: General;  Laterality: N/A;    FLUOROSCOPY N/A 1/24/2019    Procedure: Y90 mapping;  Surgeon: Geovanni Calero MD;  Location: [facility] CATH LAB;  Service: General;  Laterality: N/A;    FLUOROSCOPY N/A 5/8/2019    Procedure: FLUOROSCOPY;  Surgeon: [facility] Diagnostic Provider;  Location: [facility] OR;  Service: General;  Laterality: N/A;  microwave ablation to be done in CT      FLUOROSCOPY N/A 4/29/2019    Procedure: TACE;  Surgeon: Geovanni Calero MD;  Location: [facility] CATH LAB;  Service: General;  Laterality: N/A;    FLUOROSCOPY N/A 7/21/2020    Procedure: Y90 mapping;  Surgeon: Geovanni Calero MD;  Location: [facility] CATH LAB;  Service: General;  Laterality: N/A;    FLUOROSCOPY N/A 8/11/2020    Procedure: Y90 Mapping;  Surgeon: Geovanni Calero MD;  Location: [facility] CATH LAB;  Service: General;  Laterality: N/A;    FLUOROSCOPY N/A 9/2/2020    Procedure: Y90;  Surgeon: Geovanni Calero MD;  Location: [facility] CATH LAB;  Service: General;  Laterality: N/A;    FLUOROSCOPY N/A 9/24/2020    Procedure: Y90 Left Lobe;  Surgeon: Geovanni Calero MD;  Location: [facility] CATH LAB;  Service: General;  Laterality: N/A;    FLUOROSCOPY N/A 3/23/2021    Procedure: Y-90 mapping in specials Spin Table;  Surgeon: Geovanni Calero MD;  Location: [facility] CATH LAB;  Service: General;  Laterality: N/A;    FLUOROSCOPY N/A 3/31/2021    Procedure: Y90 treatment;  Surgeon: Geovanni Calero MD;  Location: [facility] CATH LAB;  Service: General;  Laterality: N/A;    INGUINAL HERNIA REPAIR Bilateral     KNEE SURGERY      LAPAROSCOPY N/A 1/4/2019    Procedure: LAPAROSCOPY Exploratory;  Surgeon: Edwar Fulton MD;  Location: [address];  Service: Transplant;  Laterality: N/A;    PROSTATE SURGERY       cancer removal       Family History:   Family History   Problem Relation Age of Onset    Cancer Mother     Cancer Father         bladder and prostate    Colon cancer Neg Hx     Stomach cancer Neg Hx        Social History:   Social History     Tobacco Use    Smoking status: Current Every Day Smoker     Packs/day: 0.50     Years: 30.00     Pack years: 15.00    Smokeless tobacco: Never Used   Substance Use Topics    Alcohol use: No    Drug use: No       Allergies: Reviewed    Home Medications:  Current Outpatient Medications on File Prior to Visit   Medication Sig Dispense Refill    COMBIVENT RESPIMAT  mcg/actuation inhaler every evening.       cyclobenzaprine HCl (FLEXERIL ORAL) Take by mouth every evening.      eszopiclone (LUNESTA) 3 mg Tab Take 3 mg by mouth every evening.      morphine (MSIR) 15 MG tablet Take 15 mg by mouth every 8 (eight) hours as needed for Pain.       amLODIPine (NORVASC) 5 MG tablet Take 5 mg by mouth nightly as needed.      desonide (DESOWEN) 0.05 % lotion Apply topically. Once in 2 days for rash      lactulose (CHRONULAC) 10 gram/15 mL solution Take by mouth as needed.      mometasone 0.1% (ELOCON) 0.1 % cream Apply topically as needed (rash).      pantoprazole (PROTONIX) 20 MG tablet Take 1 tablet (20 mg total) by mouth once daily. (Patient not taking: No sig reported) 30 tablet 5    polyethylene glycol (GLYCOLAX) 17 gram/dose powder Take 17 g by mouth once daily. (Patient not taking: No sig reported) 235 g 1    SYMBICORT 80-4.5 mcg/actuation HFAA SMARTSI Puff(s) By Mouth Twice Daily      [DISCONTINUED] diazePAM (VALIUM) 5 MG tablet 1-2 tablets PO x 1 dose (Patient not taking: Reported on 2022) 2 tablet 0     Current Facility-Administered Medications on File Prior to Visit   Medication Dose Route Frequency Provider Last Rate Last Admin    0.9%  NaCl infusion   Intravenous Continuous Case KEN Crook 50 mL/hr at 20 1230 New Bag at 20 1230     "[COMPLETED] gadobutroL (GADAVIST) injection 6.5 mL  6.5 mL Intravenous ONCE PRN Elysia Parks, ROBYNP   6.5 mL at 02/01/22 1044       Physical Exam:  Vital Signs:  /70   Pulse 76   Ht 5' 11" (1.803 m)   Wt 66.8 kg (147 lb 4.3 oz)   BMI 20.54 kg/m²   Body mass index is 20.54 kg/m².  Physical Exam  Vitals reviewed.   Constitutional:       Appearance: He is well-developed.   HENT:      Head: Normocephalic.   Eyes:      General: No scleral icterus.  Cardiovascular:      Rate and Rhythm: Normal rate.   Pulmonary:      Effort: Pulmonary effort is normal.   Abdominal:      General: There is no distension.      Tenderness: There is no abdominal tenderness.   Musculoskeletal:         General: Normal range of motion.      Cervical back: Normal range of motion.   Skin:     General: Skin is warm and dry.   Neurological:      Mental Status: He is alert and oriented to person, place, and time.   Psychiatric:         Mood and Affect: Mood and affect normal.         Labs: Pertinent labs reviewed.  MELD-Na score: 12 at 2/1/2022  9:25 AM  MELD score: 12 at 2/1/2022  9:25 AM  Calculated from:  Serum Creatinine: 1.8 mg/dL at 2/1/2022  9:25 AM  Serum Sodium: 137 mmol/L at 2/1/2022  9:25 AM  Total Bilirubin: 0.8 mg/dL (Using min of 1 mg/dL) at 2/1/2022  9:25 AM  INR(ratio): 1.0 at 2/1/2022  9:25 AM  Age: 76 years  EV: Needs EGD for screening.  Will place order   HCC: See HPI     Assessment:  1. Other cirrhosis of liver    2. Liver disease, unspecified    3. Hepatocellular carcinoma    4. Compensated HCV cirrhosis    5. Abnormal findings on diagnostic imaging of liver and biliary tract         Recommendations:  Other cirrhosis of liver  Liver disease, unspecified  - stable without any new decompensating events  - continue with MELD labs and HCC surveillance every 3 months  - will plan for an EGD for EV screening.      -     CBC Auto Differential; Future; Expected date: 02/02/2022  -     AFP Tumor Marker; Future; Expected date: " 02/02/2022  -     Comprehensive Metabolic Panel; Future; Expected date: 02/02/2022  -     Protime-INR; Future; Expected date: 02/02/2022  -     MRI Abdomen W WO Contrast    Hepatocellular carcinoma  - no new lesions noted on MRI  - will continue with MRI every 3 months with AFP for continued surveillance.   - pt requesting valium as he has anxiety about closed in spaces.   -     diazepam (VALIUM) 5 MG tablet; 1-2 tablets PO x 1 dose  Dispense: 2 tablet; Refill: 0    Compensated HCV cirrhosis  - no decompensating events noted.   - see above             Return to Clinic:  3 months with pre-clinic labs and imaging.        yes

## 2022-03-18 ENCOUNTER — NURSE TRIAGE (OUTPATIENT)
Dept: ADMINISTRATIVE | Facility: CLINIC | Age: 77
End: 2022-03-18
Payer: MEDICARE

## 2022-03-18 ENCOUNTER — TELEPHONE (OUTPATIENT)
Dept: HEPATOLOGY | Facility: CLINIC | Age: 77
End: 2022-03-18
Payer: MEDICARE

## 2022-03-18 ENCOUNTER — HOSPITAL ENCOUNTER (EMERGENCY)
Facility: HOSPITAL | Age: 77
Discharge: HOME | End: 2022-03-18
Attending: FAMILY MEDICINE
Payer: MEDICARE

## 2022-03-18 VITALS
WEIGHT: 155.31 LBS | SYSTOLIC BLOOD PRESSURE: 171 MMHG | DIASTOLIC BLOOD PRESSURE: 89 MMHG | TEMPERATURE: 98 F | RESPIRATION RATE: 16 BRPM | HEIGHT: 71 IN | HEART RATE: 77 BPM | BODY MASS INDEX: 21.74 KG/M2 | OXYGEN SATURATION: 99 %

## 2022-03-18 DIAGNOSIS — R19.00 ABDOMINAL SWELLING: ICD-10-CM

## 2022-03-18 DIAGNOSIS — R31.9 HEMATURIA, UNSPECIFIED TYPE: Primary | ICD-10-CM

## 2022-03-18 LAB
ALBUMIN SERPL BCP-MCNC: 2.7 G/DL (ref 3.5–5.2)
ALP SERPL-CCNC: 388 U/L (ref 55–135)
ALT SERPL W/O P-5'-P-CCNC: 133 U/L (ref 10–44)
ANION GAP SERPL CALC-SCNC: 8 MMOL/L (ref 8–16)
AST SERPL-CCNC: 198 U/L (ref 10–40)
BASOPHILS # BLD AUTO: 0.04 K/UL (ref 0–0.2)
BASOPHILS NFR BLD: 0.4 % (ref 0–1.9)
BILIRUB SERPL-MCNC: 3.2 MG/DL (ref 0.1–1)
BILIRUB UR QL STRIP: NEGATIVE
BUN SERPL-MCNC: 27 MG/DL (ref 8–23)
CALCIUM SERPL-MCNC: 8.9 MG/DL (ref 8.7–10.5)
CHLORIDE SERPL-SCNC: 101 MMOL/L (ref 95–110)
CLARITY UR: CLEAR
CO2 SERPL-SCNC: 25 MMOL/L (ref 23–29)
COLOR UR: YELLOW
CREAT SERPL-MCNC: 1.4 MG/DL (ref 0.5–1.4)
DIFFERENTIAL METHOD: ABNORMAL
EOSINOPHIL # BLD AUTO: 0 K/UL (ref 0–0.5)
EOSINOPHIL NFR BLD: 0.1 % (ref 0–8)
ERYTHROCYTE [DISTWIDTH] IN BLOOD BY AUTOMATED COUNT: 16.1 % (ref 11.5–14.5)
EST. GFR  (AFRICAN AMERICAN): 56 ML/MIN/1.73 M^2
EST. GFR  (NON AFRICAN AMERICAN): 49 ML/MIN/1.73 M^2
GLUCOSE SERPL-MCNC: 87 MG/DL (ref 70–110)
GLUCOSE UR QL STRIP: NEGATIVE
HCT VFR BLD AUTO: 46.4 % (ref 40–54)
HGB BLD-MCNC: 15.4 G/DL (ref 14–18)
HGB UR QL STRIP: ABNORMAL
IMM GRANULOCYTES # BLD AUTO: 0.05 K/UL (ref 0–0.04)
IMM GRANULOCYTES NFR BLD AUTO: 0.5 % (ref 0–0.5)
KETONES UR QL STRIP: NEGATIVE
LEUKOCYTE ESTERASE UR QL STRIP: NEGATIVE
LIPASE SERPL-CCNC: 10 U/L (ref 4–60)
LYMPHOCYTES # BLD AUTO: 0.6 K/UL (ref 1–4.8)
LYMPHOCYTES NFR BLD: 6 % (ref 18–48)
MCH RBC QN AUTO: 31.2 PG (ref 27–31)
MCHC RBC AUTO-ENTMCNC: 33.2 G/DL (ref 32–36)
MCV RBC AUTO: 94 FL (ref 82–98)
MONOCYTES # BLD AUTO: 1.3 K/UL (ref 0.3–1)
MONOCYTES NFR BLD: 11.8 % (ref 4–15)
NEUTROPHILS # BLD AUTO: 8.6 K/UL (ref 1.8–7.7)
NEUTROPHILS NFR BLD: 81.2 % (ref 38–73)
NITRITE UR QL STRIP: NEGATIVE
NRBC BLD-RTO: 0 /100 WBC
PH UR STRIP: 6 [PH] (ref 5–8)
PLATELET # BLD AUTO: 158 K/UL (ref 150–450)
PMV BLD AUTO: 11.8 FL (ref 9.2–12.9)
POTASSIUM SERPL-SCNC: 5.2 MMOL/L (ref 3.5–5.1)
PROT SERPL-MCNC: 8 G/DL (ref 6–8.4)
PROT UR QL STRIP: NEGATIVE
RBC # BLD AUTO: 4.94 M/UL (ref 4.6–6.2)
SODIUM SERPL-SCNC: 134 MMOL/L (ref 136–145)
SP GR UR STRIP: 1.02 (ref 1–1.03)
URN SPEC COLLECT METH UR: ABNORMAL
UROBILINOGEN UR STRIP-ACNC: 1 EU/DL
WBC # BLD AUTO: 10.62 K/UL (ref 3.9–12.7)

## 2022-03-18 PROCEDURE — 99284 EMERGENCY DEPT VISIT MOD MDM: CPT

## 2022-03-18 PROCEDURE — 85025 COMPLETE CBC W/AUTO DIFF WBC: CPT | Performed by: NURSE PRACTITIONER

## 2022-03-18 PROCEDURE — 80053 COMPREHEN METABOLIC PANEL: CPT | Performed by: FAMILY MEDICINE

## 2022-03-18 PROCEDURE — 83690 ASSAY OF LIPASE: CPT | Performed by: FAMILY MEDICINE

## 2022-03-18 PROCEDURE — 81003 URINALYSIS AUTO W/O SCOPE: CPT | Performed by: NURSE PRACTITIONER

## 2022-03-18 RX ORDER — SYRING-NEEDL,DISP,INSUL,0.3 ML 29 G X1/2"
296 SYRINGE, EMPTY DISPOSABLE MISCELLANEOUS
Status: DISCONTINUED | OUTPATIENT
Start: 2022-03-18 | End: 2022-03-19 | Stop reason: HOSPADM

## 2022-03-18 RX ORDER — PSEUDOEPHEDRINE/ACETAMINOPHEN 30MG-500MG
100 TABLET ORAL
Status: DISCONTINUED | OUTPATIENT
Start: 2022-03-18 | End: 2022-03-19 | Stop reason: HOSPADM

## 2022-03-18 NOTE — ED NOTES
Bed: 02  Expected date:   Expected time:   Means of arrival: Personal Transportation  Comments:  Peña

## 2022-03-18 NOTE — TELEPHONE ENCOUNTER
----- Message from Gaby Apodaca sent at 3/18/2022  1:55 PM CDT -----  Contact: Shahab Gudino is a patient of Dr. Parks and he stated he is passing blood and has been of and on for a week. He also stated he is having stomach pain and he isn't sure if it is related. Please call him back at [phone]

## 2022-03-18 NOTE — TELEPHONE ENCOUNTER
Pt reports passing blood in urine x's 1 week, had clots at first now reports it as rust color, c/o intermittent abdominal pain, rates pain 7/10, swollen abdomin, & constipation. Pt reports it feels like my stomach is infected, states PCP office told him he needed to go to ER. Advised pt he needs to go to ER now per protocol, verbalized understanding.     Reason for Disposition   Pain or burning with passing urine (urination)   Patient sounds very sick or weak to the triager    Additional Information   Negative: Shock suspected (e.g., cold/pale/clammy skin, too weak to stand, low BP, rapid pulse)   Negative: Sounds like a life-threatening emergency to the triager   Negative: Recent back or abdominal injury   Negative: Recent genital injury   Negative: Unable to urinate (or only a few drops) > 4 hours and bladder feels very full (e.g., palpable bladder or strong urge to urinate)   Negative: Passing pure blood or large blood clots (i.e., larger than a dime or grape)   Negative: Fever > 100.4 F (38.0 C)   Negative: Patient sounds very sick or weak to the triager   Negative: Known sickle cell disease   Negative: Taking Coumadin (warfarin) or other strong blood thinner, or known bleeding disorder (e.g., thrombocytopenia)   Negative: Side (flank) or back pain present   Negative: Passed out (i.e., fainted, collapsed and was not responding)   Negative: Shock suspected (e.g., cold/pale/clammy skin, too weak to stand, low BP, rapid pulse)   Negative: Sounds like a life-threatening emergency to the triager   Negative: SEVERE abdominal pain (e.g., excruciating)   Negative: Vomiting red blood or black (coffee ground) material   Negative: Bloody, black, or tarry bowel movements (Exception: chronic-unchanged black-grey bowel movements and is taking iron pills or Pepto-Bismol)   Negative: Unable to urinate (or only a few drops) and bladder feels very full   Negative: Pain in scrotum persists > 1 hour    Negative: Constant abdominal pain lasting > 2 hours   Negative: Vomiting bile (green color)    Protocols used: URINE - BLOOD IN-A-OH, ABDOMINAL PAIN - MALE-A-OH

## 2022-03-18 NOTE — TELEPHONE ENCOUNTER
Called patient back and her informed me that he has been having blood in his urine and stomach pain. I spoke with Sandra Martinez and she informed me to let the patient know that he needed to go to the ER. He verbalized understanding.

## 2022-03-18 NOTE — FIRST PROVIDER EVALUATION
"Medical screening exam completed.  I have conducted a focused provider triage encounter, findings are as follows:    Brief history of present illness:  Pt presents with c/o blood in the urine starting last week.  States this week he started noticing abdominal pain and swelling.    Vitals:    03/18/22 1648   BP: 94/60   BP Location: Right arm   Patient Position: Sitting   Pulse: 85   Resp: 20   Temp: 97.4 °F (36.3 °C)   TempSrc: Oral   SpO2: (!) 93%   Weight: 70.5 kg (155 lb 5 oz)   Height: 5' 11" (1.803 m)       Pertinent physical exam:      Brief workup plan:      Preliminary workup initiated; this workup will be continued and followed by the physician or advanced practice provider that is assigned to the patient when roomed.  "

## 2022-03-19 NOTE — ED PROVIDER NOTES
SCRIBE #1 NOTE: I, Steve Lundy, am scribing for, and in the presence of, Shena Scales MD. I have scribed the entire note.       History     Chief Complaint   Patient presents with    Hematuria     X 6 days; constipation and abd discomfort x 1 week     Review of patient's allergies indicates:  No Known Allergies      History of Present Illness     HPI    3/18/2022, 7:03 PM  History obtained from the patient      History of Present Illness: Shahab Pompa is a 76 y.o. male patient with a PMHx of prostate cancer, lung fibrosis, GERD, Hepatitis C, neuromuscular disorder who presents to the Emergency Department for evaluation of hematuria which onset gradually 6 days PTA. Pt also c/o constipation and abdominal discomfort for a week. Symptoms are constant and moderate in severity. No mitigating or exacerbating factors reported. Associated sxs include constipation, abdominal pain. Patient denies any n/v/d, fever, chills, and all other sxs at this time. No prior Tx reported. No further complaints or concerns at this time.       Arrival mode: Personal vehicle    PCP: Blake Escobar MD        Past Medical History:  Past Medical History:   Diagnosis Date    Cancer     prostate, bladder, and ear    Fibrosis lung     GERD (gastroesophageal reflux disease)     Hepatitis C     tx'd in 2016 w/ Pancho - achieved SVR    Neuromuscular disorder        Past Surgical History:  Past Surgical History:   Procedure Laterality Date    BLADDER SURGERY      cancer removal    CAROTID ENDARTERECTOMY Right     COMPUTED TOMOGRAPHY N/A 6/18/2019    Procedure: CT (COMPUTED TOMOGRAPHY);  Surgeon: Christos Diagnostic Provider;  Location: [facility] OR;  Service: General;  Laterality: N/A;  Microwave ablation of liver to be done in CT  Need CRNA and cart    COMPUTED TOMOGRAPHY N/A 9/10/2019    Procedure: CT (COMPUTED TOMOGRAPHY);  Surgeon: Dosc Diagnostic Provider;  Location: [facility] OR;  Service: General;  Laterality: N/A;  Microwave ablation  liver to be done in CT  Need CRNA and cart    FLUOROSCOPY N/A 1/31/2019    Procedure: Y90 POST;  Surgeon: Geovanni Calero MD;  Location: [facility] CATH LAB;  Service: General;  Laterality: N/A;    FLUOROSCOPY N/A 1/24/2019    Procedure: Y90 mapping;  Surgeon: Geovanni Calero MD;  Location: [facility] CATH LAB;  Service: General;  Laterality: N/A;    FLUOROSCOPY N/A 5/8/2019    Procedure: FLUOROSCOPY;  Surgeon: Christos Diagnostic Provider;  Location: [facility] OR;  Service: General;  Laterality: N/A;  microwave ablation to be done in CT      FLUOROSCOPY N/A 4/29/2019    Procedure: TACE;  Surgeon: eGovanni Calero MD;  Location: [facility] CATH LAB;  Service: General;  Laterality: N/A;    FLUOROSCOPY N/A 7/21/2020    Procedure: Y90 mapping;  Surgeon: Geovanni Calero MD;  Location: [facility] CATH LAB;  Service: General;  Laterality: N/A;    FLUOROSCOPY N/A 8/11/2020    Procedure: Y90 Mapping;  Surgeon: Geovanni Calero MD;  Location: [facility] CATH LAB;  Service: General;  Laterality: N/A;    FLUOROSCOPY N/A 9/2/2020    Procedure: Y90;  Surgeon: Geovanni Calero MD;  Location: [facility] CATH LAB;  Service: General;  Laterality: N/A;    FLUOROSCOPY N/A 9/24/2020    Procedure: Y90 Left Lobe;  Surgeon: Geovanni Calero MD;  Location: [facility] CATH LAB;  Service: General;  Laterality: N/A;    FLUOROSCOPY N/A 3/23/2021    Procedure: Y-90 mapping in specials Spin Table;  Surgeon: Geovanni Calero MD;  Location: [facility] CATH LAB;  Service: General;  Laterality: N/A;    FLUOROSCOPY N/A 3/31/2021    Procedure: Y90 treatment;  Surgeon: Geovanni Calero MD;  Location: [facility] CATH LAB;  Service: General;  Laterality: N/A;    INGUINAL HERNIA REPAIR Bilateral     KNEE SURGERY      LAPAROSCOPY N/A 1/4/2019    Procedure: LAPAROSCOPY Exploratory;  Surgeon: Edwar Fulton MD;  Location: [address];  Service: Transplant;  Laterality: N/A;    PROSTATE SURGERY      cancer removal         Family History:  Family History   Problem Relation Age of Onset    Cancer  Mother     Cancer Father         bladder and prostate    Colon cancer Neg Hx     Stomach cancer Neg Hx        Social History:  Social History     Tobacco Use    Smoking status: Current Every Day Smoker     Packs/day: 0.50     Years: 30.00     Pack years: 15.00    Smokeless tobacco: Never Used   Substance and Sexual Activity    Alcohol use: No    Drug use: No    Sexual activity: Not Currently        Review of Systems     Review of Systems   Constitutional: Negative for chills and fever.   HENT: Negative for sore throat.    Respiratory: Negative for shortness of breath.    Cardiovascular: Negative for chest pain.   Gastrointestinal: Positive for abdominal pain and constipation. Negative for diarrhea, nausea and vomiting.   Genitourinary: Positive for hematuria. Negative for dysuria.   Musculoskeletal: Negative for back pain.   Skin: Negative for rash.   Neurological: Negative for weakness.   Hematological: Does not bruise/bleed easily.   All other systems reviewed and are negative.     Physical Exam     Initial Vitals [03/18/22 1648]   BP Pulse Resp Temp SpO2   94/60 85 20 97.4 °F (36.3 °C) (!) 93 %      MAP       --          Physical Exam  Nursing Notes and Vital Signs Reviewed.  Constitutional: Patient is in no acute distress. Well-developed and well-nourished.  Head: Atraumatic. Normocephalic.  Eyes: PERRL. EOM intact. Conjunctivae are not pale. No scleral icterus.  ENT: Mucous membranes are moist. Oropharynx is clear and symmetric.    Neck: Supple. Full ROM. No lymphadenopathy.  Cardiovascular: Regular rate. Regular rhythm. No murmurs, rubs, or gallops. Distal pulses are 2+ and symmetric.  Pulmonary/Chest: No respiratory distress. Clear to auscultation bilaterally. No wheezing or rales.  Abdominal: Soft and non-distended.  Suprapubic tenderness.  No rebound, guarding, or rigidity. Good bowel sounds.  Genitourinary: No CVA tenderness  Musculoskeletal: Moves all extremities. No obvious deformities. No edema.  "No calf tenderness.  Skin: Warm and dry.  Neurological:  Alert, awake, and appropriate.  Normal speech.  No acute focal neurological deficits are appreciated.  Psychiatric: Normal affect. Good eye contact. Appropriate in content.     ED Course   Procedures  ED Vital Signs:  Vitals:    03/18/22 1648 03/18/22 1850 03/18/22 2108 03/18/22 2224   BP: 94/60 (!) 174/92 (!) 180/93 (!) 171/89   Pulse: 85 70 81 77   Resp: 20 16 20 16   Temp: 97.4 °F (36.3 °C)   98.1 °F (36.7 °C)   TempSrc: Oral   Oral   SpO2: (!) 93% 98% 100% 99%   Weight: 70.5 kg (155 lb 5 oz)      Height: 5' 11" (1.803 m)          Abnormal Lab Results:  Labs Reviewed   CBC W/ AUTO DIFFERENTIAL - Abnormal; Notable for the following components:       Result Value    MCH 31.2 (*)     RDW 16.1 (*)     Gran # (ANC) 8.6 (*)     Immature Grans (Abs) 0.05 (*)     Lymph # 0.6 (*)     Mono # 1.3 (*)     Gran % 81.2 (*)     Lymph % 6.0 (*)     All other components within normal limits   URINALYSIS, REFLEX TO URINE CULTURE - Abnormal; Notable for the following components:    Occult Blood UA Trace (*)     All other components within normal limits    Narrative:     Specimen Source->Urine   COMPREHENSIVE METABOLIC PANEL - Abnormal; Notable for the following components:    Sodium 134 (*)     Potassium 5.2 (*)     BUN 27 (*)     Albumin 2.7 (*)     Total Bilirubin 3.2 (*)     Alkaline Phosphatase 388 (*)      (*)      (*)     eGFR if  56 (*)     eGFR if non  49 (*)     All other components within normal limits   LIPASE        All Lab Results:  Results for orders placed or performed during the hospital encounter of 03/18/22   CBC auto differential   Result Value Ref Range    WBC 10.62 3.90 - 12.70 K/uL    RBC 4.94 4.60 - 6.20 M/uL    Hemoglobin 15.4 14.0 - 18.0 g/dL    Hematocrit 46.4 40.0 - 54.0 %    MCV 94 82 - 98 fL    MCH 31.2 (H) 27.0 - 31.0 pg    MCHC 33.2 32.0 - 36.0 g/dL    RDW 16.1 (H) 11.5 - 14.5 %    Platelets 158 150 " - 450 K/uL    MPV 11.8 9.2 - 12.9 fL    Immature Granulocytes 0.5 0.0 - 0.5 %    Gran # (ANC) 8.6 (H) 1.8 - 7.7 K/uL    Immature Grans (Abs) 0.05 (H) 0.00 - 0.04 K/uL    Lymph # 0.6 (L) 1.0 - 4.8 K/uL    Mono # 1.3 (H) 0.3 - 1.0 K/uL    Eos # 0.0 0.0 - 0.5 K/uL    Baso # 0.04 0.00 - 0.20 K/uL    nRBC 0 0 /100 WBC    Gran % 81.2 (H) 38.0 - 73.0 %    Lymph % 6.0 (L) 18.0 - 48.0 %    Mono % 11.8 4.0 - 15.0 %    Eosinophil % 0.1 0.0 - 8.0 %    Basophil % 0.4 0.0 - 1.9 %    Differential Method Automated    Urinalysis, Reflex to Urine Culture Urine, Clean Catch    Specimen: Urine   Result Value Ref Range    Specimen UA Urine, Clean Catch     Color, UA Yellow Yellow, Straw, Elisabeth    Appearance, UA Clear Clear    pH, UA 6.0 5.0 - 8.0    Specific Gravity, UA 1.020 1.005 - 1.030    Protein, UA Negative Negative    Glucose, UA Negative Negative    Ketones, UA Negative Negative    Bilirubin (UA) Negative Negative    Occult Blood UA Trace (A) Negative    Nitrite, UA Negative Negative    Urobilinogen, UA 1.0 <2.0 EU/dL    Leukocytes, UA Negative Negative   Comprehensive metabolic panel   Result Value Ref Range    Sodium 134 (L) 136 - 145 mmol/L    Potassium 5.2 (H) 3.5 - 5.1 mmol/L    Chloride 101 95 - 110 mmol/L    CO2 25 23 - 29 mmol/L    Glucose 87 70 - 110 mg/dL    BUN 27 (H) 8 - 23 mg/dL    Creatinine 1.4 0.5 - 1.4 mg/dL    Calcium 8.9 8.7 - 10.5 mg/dL    Total Protein 8.0 6.0 - 8.4 g/dL    Albumin 2.7 (L) 3.5 - 5.2 g/dL    Total Bilirubin 3.2 (H) 0.1 - 1.0 mg/dL    Alkaline Phosphatase 388 (H) 55 - 135 U/L     (H) 10 - 40 U/L     (H) 10 - 44 U/L    Anion Gap 8 8 - 16 mmol/L    eGFR if African American 56 (A) >60 mL/min/1.73 m^2    eGFR if non African American 49 (A) >60 mL/min/1.73 m^2   Lipase   Result Value Ref Range    Lipase 10 4 - 60 U/L         Imaging Results:  Imaging Results          X-Ray Abdomen Flat And Erect (Final result)  Result time 03/18/22 18:31:54    Final result by Gavino Morillo MD  (03/18/22 18:31:54)                 Impression:      As above      Electronically signed by: Ilia Mancia  Date:    03/18/2022  Time:    18:31             Narrative:    EXAMINATION:  XR ABDOMEN FLAT AND ERECT    CLINICAL HISTORY:  Intra-abdominal and pelvic swelling, mass and lump, unspecified site    TECHNIQUE:  Flat and erect AP views of the abdomen were performed.    COMPARISON:  None    FINDINGS:  Extensive amount of stool in the colon suggestive of constipation.  Postsurgical changes in the abdomen.  Senescent changes noted.  No bowel obstruction..                                        The Emergency Provider reviewed the vital signs and test results, which are outlined above.     ED Discussion       10:11 PM: Reassessed pt at this time. Discussed with pt all pertinent ED information and results. Discussed pt dx and plan of tx. Gave pt all f/u and return to the ED instructions. All questions and concerns were addressed at this time. Pt expresses understanding of information and instructions, and is comfortable with plan to discharge. Pt is stable for discharge.    I discussed with patient and/or family/caretaker that evaluation in the ED does not suggest any emergent or life threatening medical conditions requiring immediate intervention beyond what was provided in the ED, and I believe patient is safe for discharge.  Regardless, an unremarkable evaluation in the ED does not preclude the development or presence of a serious of life threatening condition. As such, patient was instructed to return immediately for any worsening or change in current symptoms.         Medical Decision Making:   Clinical Tests:   Lab Tests: Ordered and Reviewed  Radiological Study: Ordered and Reviewed           ED Medication(s):  Medications - No data to display    Discharge Medication List as of 3/18/2022 10:11 PM           Follow-up Information     Schedule an appointment as soon as possible for a visit  with Blake Escobar MD.     Specialty: Internal Medicine  Why: As needed  Contact information:  [address] [facility] [address]  [phone]                             Scribe Attestation:   Scribe #1: I performed the above scribed service and the documentation accurately describes the services I performed. I attest to the accuracy of the note.     Attending:   Physician Attestation Statement for Scribe #1: I, Shena Scales MD, personally performed the services described in this documentation, as scribed by Steve Lundy, in my presence, and it is both accurate and complete.           Clinical Impression       ICD-10-CM ICD-9-CM   1. Hematuria, unspecified type  R31.9 599.70   2. Abdominal swelling  R19.00 789.30       Disposition:   Disposition: Discharged  Condition: Stable         Shena Scales MD  03/19/22 6751     (3) adequate

## 2022-04-20 DIAGNOSIS — K76.9 LIVER DISEASE: Primary | ICD-10-CM

## 2022-04-20 DIAGNOSIS — K74.69 COMPENSATED HCV CIRRHOSIS: ICD-10-CM

## 2022-04-20 DIAGNOSIS — B19.20 COMPENSATED HCV CIRRHOSIS: ICD-10-CM

## 2022-04-20 DIAGNOSIS — C22.0 HEPATOCELLULAR CARCINOMA: ICD-10-CM

## 2023-01-05 NOTE — DISCHARGE SUMMARY
Addended by: FRANKY WASHINGTON on: 1/5/2023 04:21 PM     Modules accepted: Orders     Radiology Discharge Summary      Hospital Course: No complications    Admit Date: 9/2/2020  Discharge Date: 09/02/2020     Instructions Given to Patient: Yes  Diet: regular diet and Resume prior diet  Activity: activity as tolerated and no driving for today    Description of Condition on Discharge: Stable  Vital Signs (Most Recent): Temp: 98.6 °F (37 °C) (09/02/20 1224)  Pulse: 68 (09/02/20 1224)  Resp: 16 (09/02/20 1224)  BP: (!) 157/85 (09/02/20 1419)  SpO2: 98 % (09/02/20 1224)    Discharge Disposition: Home    Discharge Diagnosis: hcc     Follow-up: left lobe y90 in 3-4 weeks pending lfts    Geovanni Calero MD  Staff Radiologist  Department of Radiology  Pager: [phone]

## 2023-11-09 NOTE — H&P (VIEW-ONLY)
Conjunctivae and eyelids appear normal,  Sclerae : White without injection  [facility] -   History & Physical    Subjective:      Chief Complaint/Reason for Admission: HCC    Shahab Pompa is a 74 y.o. male.    Past Medical History:   Diagnosis Date    Cancer     prostate, bladder, and ear    Fibrosis lung     GERD (gastroesophageal reflux disease)     Hepatitis C     tx'd in 2016 w/ Harvoni - achieved SVR    Neuromuscular disorder      Past Surgical History:   Procedure Laterality Date    BLADDER SURGERY      cancer removal    CAROTID ENDARTERECTOMY Right     COMPUTED TOMOGRAPHY N/A 6/18/2019    Procedure: CT (COMPUTED TOMOGRAPHY);  Surgeon: [facility] Diagnostic Provider;  Location: [facility] OR;  Service: General;  Laterality: N/A;  Microwave ablation of liver to be done in CT  Need CRNA and cart    COMPUTED TOMOGRAPHY N/A 9/10/2019    Procedure: CT (COMPUTED TOMOGRAPHY);  Surgeon: [facility] Diagnostic Provider;  Location: [facility] OR;  Service: General;  Laterality: N/A;  Microwave ablation liver to be done in CT  Need CRNA and cart    FLUOROSCOPY N/A 1/31/2019    Procedure: Y90 POST;  Surgeon: Geovanni Calero MD;  Location: [facility] CATH LAB;  Service: General;  Laterality: N/A;    FLUOROSCOPY N/A 1/24/2019    Procedure: Y90 mapping;  Surgeon: Geovanni Calero MD;  Location: [facility] CATH LAB;  Service: General;  Laterality: N/A;    FLUOROSCOPY N/A 5/8/2019    Procedure: FLUOROSCOPY;  Surgeon: [facility] Diagnostic Provider;  Location: [facility];  Service: General;  Laterality: N/A;  microwave ablation to be done in CT      FLUOROSCOPY N/A 4/29/2019    Procedure: TACE;  Surgeon: Geovanni Calero MD;  Location: [facility] CATH LAB;  Service: General;  Laterality: N/A;    FLUOROSCOPY N/A 7/21/2020    Procedure: Y90 mapping;  Surgeon: Geovanni Calero MD;  Location: [facility] CATH LAB;  Service: General;  Laterality: N/A;    INGUINAL HERNIA REPAIR Bilateral     KNEE SURGERY      LAPAROSCOPY N/A 1/4/2019    Procedure: LAPAROSCOPY Exploratory;  Surgeon: Edwar Fulton MD;  Location: [facility] OR [address] [place] [address];   Service: Transplant;  Laterality: N/A;    PROSTATE SURGERY      cancer removal     Family History   Problem Relation Age of Onset    Cancer Mother     Cancer Father         bladder and prostate    Colon cancer Neg Hx     Stomach cancer Neg Hx      Social History     Tobacco Use    Smoking status: Current Every Day Smoker     Packs/day: 0.50     Years: 30.00     Pack years: 15.00    Smokeless tobacco: Never Used   Substance Use Topics    Alcohol use: No    Drug use: No       PTA Medications   Medication Sig    amLODIPine (NORVASC) 5 MG tablet Take 5 mg by mouth nightly as needed.    aspirin-sod bicarb-citric acid (SHON-SELTZER) 324 mg TbEF Take 325 mg by mouth every 6 (six) hours as needed.    COMBIVENT RESPIMAT  mcg/actuation inhaler every evening.     cyclobenzaprine HCl (FLEXERIL ORAL) Take by mouth every evening.    desonide (DESOWEN) 0.05 % lotion Apply topically. Once in 2 days for rash    diazePAM (VALIUM) 5 MG tablet Take 1 tablet (5 mg total) by mouth On call Procedure for Anxiety. Take 1 pill an hour before MRI and then 1 pill if needed before scan    eszopiclone (LUNESTA) 3 mg Tab Take 3 mg by mouth every evening.    lactulose (CHRONULAC) 10 gram/15 mL solution Take by mouth as needed.    mometasone 0.1% (ELOCON) 0.1 % cream Apply topically as needed (rash).    morphine (MSIR) 15 MG tablet Take 15 mg by mouth every 8 (eight) hours as needed for Pain.     ondansetron (ZOFRAN) 8 MG tablet Take 1 tablet (8 mg total) by mouth every 8 (eight) hours as needed for Nausea.    oxyCODONE-acetaminophen (LYNOX) 5-300 mg per tablet Take 1 tablet by mouth every 4 (four) hours as needed for Pain.     Review of patient's allergies indicates:  No Known Allergies     Review of Systems   Constitutional: Negative.    HENT: Negative.    Eyes: Negative.    Cardiovascular: Negative.    Skin: Negative.        Objective:      Vital Signs (Most Recent)       Vital Signs Range (Last 24H):       Physical  Exam  Constitutional:       Appearance: Normal appearance.   HENT:      Head: Normocephalic.      Nose: Nose normal.   Neck:      Musculoskeletal: Normal range of motion.   Cardiovascular:      Rate and Rhythm: Normal rate.   Pulmonary:      Effort: Pulmonary effort is normal.         Data Review:    CBC:   Lab Results   Component Value Date    WBC 7.72 06/24/2020    RBC 4.94 06/24/2020    HGB 14.7 06/24/2020    HCT 47.2 06/24/2020     06/24/2020      ECG: no prior ECG.     Assessment:      Active Hospital Problems    Diagnosis  POA    Hepatocellular carcinoma [C22.0]  Yes     Priority: High      Resolved Hospital Problems   No resolved problems to display.       Plan:    Y90 for HCC treatment

## (undated) DEVICE — GUIDEWIRE FATHOM .016 X 180

## (undated) DEVICE — CATH ANGIO PROGREAT 2.8X150CM

## (undated) DEVICE — OMNIPAQUE 300MG 150ML VIAL

## (undated) DEVICE — CONTROLLER DETACHMENT

## (undated) DEVICE — PACK CATH LAB CUSTOM BR

## (undated) DEVICE — CONTRAST OMNIPAQUE 240 150ML

## (undated) DEVICE — PAD K-THERMIA 24IN X 60IN

## (undated) DEVICE — DEVICE CLOSURE MYNX GRIP 5FR

## (undated) DEVICE — COVER LIGHT HANDLE 80/CA

## (undated) DEVICE — CLIP LIGATION MEDIUM CLIPS 1

## (undated) DEVICE — SEE MEDLINE ITEM 146313

## (undated) DEVICE — STAPLER SKIN ROTATING HEAD

## (undated) DEVICE — WARMER DRAPE STERILE LF

## (undated) DEVICE — KIT MANIFOLD LOW PRESS TUBING

## (undated) DEVICE — CATH MOTARJEME 5FR 65CM

## (undated) DEVICE — SEE MEDLINE ITEM 156911

## (undated) DEVICE — GUIDEWIRE STF .035X180CM ANG

## (undated) DEVICE — CLIPPER BLADE MOD 4406 (CAREF)

## (undated) DEVICE — KIT INTRODUCER STIFFEN MICRO

## (undated) DEVICE — KIT NEEDLE GUIDE 18G

## (undated) DEVICE — CATH PROGREAT 110 CM 2.4FR

## (undated) DEVICE — BAND TR COMP DEVICE REG 24CM

## (undated) DEVICE — HEMOSTAT SURGICEL NU-KNIT 6X9

## (undated) DEVICE — Device

## (undated) DEVICE — KIT SYR REUSABLE

## (undated) DEVICE — WIRE GUIDE TEFLON 3CM .035 145

## (undated) DEVICE — GUIDEWIRE STD .035X260CM JTIP

## (undated) DEVICE — SEE MEDLINE ITEM 152622

## (undated) DEVICE — ELECTRODE REM PLYHSV RETURN 9

## (undated) DEVICE — ANGIOTOUCH KIT

## (undated) DEVICE — PACK UNIVERSAL SPLIT II

## (undated) DEVICE — TRAY FOLEY 16FR INFECTION CONT

## (undated) DEVICE — SHEATH INTRODUCER 6FR 11CM

## (undated) DEVICE — DRAPE STERI INSTRUMENT 1018

## (undated) DEVICE — KIT PRECISION ACCESS 5FR ECHO

## (undated) DEVICE — CATH PROGREAT 130 CM 2.4FR

## (undated) DEVICE — APPLICATOR CHLORAPREP ORN 26ML

## (undated) DEVICE — CATH JACKY RADIAL 3.5 110CM

## (undated) DEVICE — SYR ONLY LUER LOCK 20CC

## (undated) DEVICE — GOWN SURGICAL X-LARGE

## (undated) DEVICE — GLIDESHEATH SLENDER SS 5FR10CM

## (undated) DEVICE — DRAPE INCISE IOBAN 2 23X17IN

## (undated) DEVICE — SHEATH INTRODUCER 5FR 10CM

## (undated) DEVICE — SEE MEDLINE ITEM 146417